# Patient Record
Sex: MALE | Race: WHITE | NOT HISPANIC OR LATINO | Employment: OTHER | URBAN - METROPOLITAN AREA
[De-identification: names, ages, dates, MRNs, and addresses within clinical notes are randomized per-mention and may not be internally consistent; named-entity substitution may affect disease eponyms.]

---

## 2017-02-04 ENCOUNTER — CLINICAL SUPPORT NO REQUIREMENTS (OUTPATIENT)
Dept: CARDIOLOGY | Facility: CLINIC | Age: 77
End: 2017-02-04

## 2017-02-04 DIAGNOSIS — I42.9 CARDIOMYOPATHY (HCC): Primary | ICD-10-CM

## 2017-02-05 ENCOUNTER — TELEPHONE (OUTPATIENT)
Dept: CARDIOLOGY | Facility: CLINIC | Age: 77
End: 2017-02-05

## 2017-02-05 NOTE — TELEPHONE ENCOUNTER
Pt reached RRT (XAVI) as of 2/4/17. He has a Medtronic ICD with RA and RV leads. He is programmed DDD and AP 64% of the time and  < 0.1%. He is not on AC. I called and informed him of his device status.

## 2017-02-15 ENCOUNTER — TRANSCRIBE ORDERS (OUTPATIENT)
Dept: CARDIOLOGY | Facility: CLINIC | Age: 77
End: 2017-02-15

## 2017-02-15 ENCOUNTER — LAB (OUTPATIENT)
Dept: LAB | Facility: HOSPITAL | Age: 77
End: 2017-02-15
Attending: INTERNAL MEDICINE

## 2017-02-15 DIAGNOSIS — Z01.818 PRE-OP TESTING: ICD-10-CM

## 2017-02-15 DIAGNOSIS — Z01.818 PRE-OP TESTING: Primary | ICD-10-CM

## 2017-02-15 LAB
ANION GAP SERPL CALCULATED.3IONS-SCNC: 13.2 MMOL/L
BASOPHILS # BLD AUTO: 0.02 10*3/MM3 (ref 0–0.2)
BASOPHILS NFR BLD AUTO: 0.3 % (ref 0–1.5)
BUN BLD-MCNC: 12 MG/DL (ref 8–23)
BUN/CREAT SERPL: 11.3 (ref 7–25)
CALCIUM SPEC-SCNC: 9.6 MG/DL (ref 8.6–10.5)
CHLORIDE SERPL-SCNC: 99 MMOL/L (ref 98–107)
CO2 SERPL-SCNC: 28.8 MMOL/L (ref 22–29)
CREAT BLD-MCNC: 1.06 MG/DL (ref 0.76–1.27)
DEPRECATED RDW RBC AUTO: 47.3 FL (ref 37–54)
EOSINOPHIL # BLD AUTO: 0.1 10*3/MM3 (ref 0–0.7)
EOSINOPHIL NFR BLD AUTO: 1.5 % (ref 0.3–6.2)
ERYTHROCYTE [DISTWIDTH] IN BLOOD BY AUTOMATED COUNT: 13.4 % (ref 11.5–14.5)
GFR SERPL CREATININE-BSD FRML MDRD: 68 ML/MIN/1.73
GLUCOSE BLD-MCNC: 131 MG/DL (ref 65–99)
HCT VFR BLD AUTO: 47.4 % (ref 40.4–52.2)
HGB BLD-MCNC: 16.3 G/DL (ref 13.7–17.6)
IMM GRANULOCYTES # BLD: 0 10*3/MM3 (ref 0–0.03)
IMM GRANULOCYTES NFR BLD: 0 % (ref 0–0.5)
LYMPHOCYTES # BLD AUTO: 2.63 10*3/MM3 (ref 0.9–4.8)
LYMPHOCYTES NFR BLD AUTO: 39.4 % (ref 19.6–45.3)
MCH RBC QN AUTO: 33.3 PG (ref 27–32.7)
MCHC RBC AUTO-ENTMCNC: 34.4 G/DL (ref 32.6–36.4)
MCV RBC AUTO: 96.7 FL (ref 79.8–96.2)
MONOCYTES # BLD AUTO: 0.57 10*3/MM3 (ref 0.2–1.2)
MONOCYTES NFR BLD AUTO: 8.5 % (ref 5–12)
NEUTROPHILS # BLD AUTO: 3.35 10*3/MM3 (ref 1.9–8.1)
NEUTROPHILS NFR BLD AUTO: 50.3 % (ref 42.7–76)
PLATELET # BLD AUTO: 182 10*3/MM3 (ref 140–500)
PMV BLD AUTO: 9.6 FL (ref 6–12)
POTASSIUM BLD-SCNC: 3.5 MMOL/L (ref 3.5–5.2)
RBC # BLD AUTO: 4.9 10*6/MM3 (ref 4.6–6)
SODIUM BLD-SCNC: 141 MMOL/L (ref 136–145)
WBC NRBC COR # BLD: 6.67 10*3/MM3 (ref 4.5–10.7)

## 2017-02-15 PROCEDURE — 36415 COLL VENOUS BLD VENIPUNCTURE: CPT

## 2017-02-15 PROCEDURE — 80048 BASIC METABOLIC PNL TOTAL CA: CPT

## 2017-02-15 PROCEDURE — 85025 COMPLETE CBC W/AUTO DIFF WBC: CPT

## 2017-02-20 ENCOUNTER — HOSPITAL ENCOUNTER (OUTPATIENT)
Facility: HOSPITAL | Age: 77
Setting detail: HOSPITAL OUTPATIENT SURGERY
Discharge: HOME | End: 2017-02-20
Attending: INTERNAL MEDICINE | Admitting: INTERNAL MEDICINE

## 2017-02-20 VITALS
HEART RATE: 72 BPM | HEIGHT: 71 IN | RESPIRATION RATE: 16 BRPM | WEIGHT: 200 LBS | TEMPERATURE: 98.1 F | DIASTOLIC BLOOD PRESSURE: 67 MMHG | SYSTOLIC BLOOD PRESSURE: 113 MMHG | OXYGEN SATURATION: 96 % | BODY MASS INDEX: 28 KG/M2

## 2017-02-20 PROCEDURE — 25010000002 FENTANYL CITRATE (PF) 100 MCG/2ML SOLUTION: Performed by: INTERNAL MEDICINE

## 2017-02-20 PROCEDURE — 25010000003 CEFAZOLIN PER 500 MG: Performed by: INTERNAL MEDICINE

## 2017-02-20 PROCEDURE — 33263 RMVL & RPLCMT DFB GEN 2 LEAD: CPT | Performed by: INTERNAL MEDICINE

## 2017-02-20 PROCEDURE — 99152 MOD SED SAME PHYS/QHP 5/>YRS: CPT | Performed by: INTERNAL MEDICINE

## 2017-02-20 PROCEDURE — 25010000002 MIDAZOLAM PER 1 MG: Performed by: INTERNAL MEDICINE

## 2017-02-20 PROCEDURE — 99153 MOD SED SAME PHYS/QHP EA: CPT | Performed by: INTERNAL MEDICINE

## 2017-02-20 PROCEDURE — C1721 AICD, DUAL CHAMBER: HCPCS | Performed by: INTERNAL MEDICINE

## 2017-02-20 DEVICE — GEN DEFIB EVERA XT DR: Type: IMPLANTABLE DEVICE | Site: CHEST WALL | Status: FUNCTIONAL

## 2017-02-20 RX ORDER — LIDOCAINE HYDROCHLORIDE 10 MG/ML
0.1 INJECTION, SOLUTION EPIDURAL; INFILTRATION; INTRACAUDAL; PERINEURAL ONCE AS NEEDED
Status: DISCONTINUED | OUTPATIENT
Start: 2017-02-20 | End: 2017-02-20 | Stop reason: HOSPADM

## 2017-02-20 RX ORDER — SODIUM CHLORIDE 9 MG/ML
75 INJECTION, SOLUTION INTRAVENOUS CONTINUOUS
Status: DISCONTINUED | OUTPATIENT
Start: 2017-02-20 | End: 2017-02-20 | Stop reason: HOSPADM

## 2017-02-20 RX ORDER — LIDOCAINE HYDROCHLORIDE 10 MG/ML
INJECTION, SOLUTION EPIDURAL; INFILTRATION; INTRACAUDAL; PERINEURAL AS NEEDED
Status: DISCONTINUED | OUTPATIENT
Start: 2017-02-20 | End: 2017-02-20 | Stop reason: HOSPADM

## 2017-02-20 RX ORDER — FINASTERIDE 5 MG/1
5 TABLET, FILM COATED ORAL DAILY
COMMUNITY

## 2017-02-20 RX ORDER — OXYCODONE HYDROCHLORIDE AND ACETAMINOPHEN 5; 325 MG/1; MG/1
1-2 TABLET ORAL EVERY 4 HOURS PRN
Qty: 15 TABLET | Refills: 0 | Status: SHIPPED | OUTPATIENT
Start: 2017-02-20 | End: 2017-09-06 | Stop reason: HOSPADM

## 2017-02-20 RX ORDER — SODIUM CHLORIDE 0.9 % (FLUSH) 0.9 %
1-10 SYRINGE (ML) INJECTION AS NEEDED
Status: DISCONTINUED | OUTPATIENT
Start: 2017-02-20 | End: 2017-02-20 | Stop reason: HOSPADM

## 2017-02-20 RX ORDER — METOPROLOL TARTRATE 100 MG/1
100 TABLET ORAL 2 TIMES DAILY
COMMUNITY
End: 2017-06-27 | Stop reason: SDUPTHER

## 2017-02-20 RX ORDER — MIDAZOLAM HYDROCHLORIDE 1 MG/ML
INJECTION INTRAMUSCULAR; INTRAVENOUS AS NEEDED
Status: DISCONTINUED | OUTPATIENT
Start: 2017-02-20 | End: 2017-02-20 | Stop reason: HOSPADM

## 2017-02-20 RX ORDER — FENTANYL CITRATE 50 UG/ML
INJECTION, SOLUTION INTRAMUSCULAR; INTRAVENOUS AS NEEDED
Status: DISCONTINUED | OUTPATIENT
Start: 2017-02-20 | End: 2017-02-20 | Stop reason: HOSPADM

## 2017-02-20 RX ORDER — RAMIPRIL 2.5 MG/1
2.5 CAPSULE ORAL DAILY
COMMUNITY
End: 2017-09-06 | Stop reason: SDUPTHER

## 2017-02-20 RX ADMIN — CEFAZOLIN SODIUM 1 G: 1 INJECTION, SOLUTION INTRAVENOUS at 11:22

## 2017-02-20 RX ADMIN — SODIUM CHLORIDE 75 ML/HR: 9 INJECTION, SOLUTION INTRAVENOUS at 09:23

## 2017-02-20 NOTE — PLAN OF CARE
Problem: Patient Care Overview (Adult)  Goal: Plan of Care Review  Outcome: Outcome(s) achieved Date Met:  02/20/17 02/20/17 7534   Coping/Psychosocial Response Interventions   Plan Of Care Reviewed With patient;spouse   Patient Care Overview   Progress improving   Outcome Evaluation   Outcome Summary/Follow up Plan READY FOR D/C       Goal: Adult Individualization and Mutuality  Outcome: Outcome(s) achieved Date Met:  02/20/17  Goal: Discharge Needs Assessment  Outcome: Outcome(s) achieved Date Met:  02/20/17    Problem: Perioperative Period (Adult)  Goal: Signs and Symptoms of Listed Potential Problems Will be Absent or Manageable (Perioperative Period)  Outcome: Ongoing (interventions implemented as appropriate)

## 2017-02-20 NOTE — H&P
Office Visit    2016  [facility] CARDIOLOGY  · Guido Michael MD   Cardiology  · Cardiomyopathy +2 more   Dx  · Cardiomyopathy, Rapid Heart Rate   Reason for visit    Progress Notes   Expand All Collapse All   Date of Office Visit: 2016  Encounter Provider: Guido Michael MD  Place of Service: [facility] CARDIOLOGY  Patient Name: Harvey Leyva  :1940            Chief Complaint   Patient presents with   • Cardiomyopathy   • Rapid Heart Rate      History of Present Illness  Mr. Leyva is a 76-year-old white male with nonischemic cardiomyopathy. As a result of his cardiomyopathy he had runs of ventricular tachycardia that were symptomatic. He underwent an ICD implant. He has been placed on magnesium as well as metoprolol and this has reduced the episodes of ventricular tachycardia substantially. He still has premature ventricular ectopics but he does not have many symptoms associated with them. Symptomatically the patient does complain of some exertional shortness of breath in the heat occasionally he'll notice some lower extremity edema and he is somewhat fatigued from both his cardiomyopathy as well as his medication. His overall ejection fraction is approximately 40%.     Since his last visit in January there have been no new medical events. The patient has not had any ventricular tachycardia that has resulted in any therapy being delivered by his defibrillator.         Medical History          Past Medical History   Diagnosis Date   • Cardiomyopathy     • Cardiomyopathy         nonischemic   • Dyspnea     • Hyperlipidemia     • Rash, skin     • Ventricular tachycardia                  Surgical History          Past Surgical History   Procedure Laterality Date   • Cardiac defibrillator placement                      Current Outpatient Prescriptions:   • ALPRAZolam (XANAX) 0.5 MG tablet, Take by mouth 3 (three) times a day., Disp: ,  "Rfl:   • busPIRone (BUSPAR) 5 MG tablet, Take 1 tablet by mouth 3 (three) times a day., Disp: , Rfl: 2  • dutasteride (AVODART) 0.5 MG capsule, Take 1 capsule by mouth daily., Disp: , Rfl: 5  • hydrochlorothiazide (HYDRODIURIL) 25 MG tablet, Take by mouth daily., Disp: , Rfl:   • MAGNESIUM-OXIDE 400 (241.3 MG) MG tablet tablet, TAKE 1 TABLET BY MOUTH DAILY, Disp: 90 tablet, Rfl: 1  • metoprolol tartrate (LOPRESSOR) 100 MG tablet, Take by mouth 2 (two) times a day., Disp: , Rfl:   • ramipril (ALTACE) 2.5 MG capsule, TAKE ONE CAPSULE BY MOUTH DAILY, Disp: 90 capsule, Rfl: 1  • tamsulosin (FLOMAX) 0.4 MG capsule 24 hr capsule, Take 1 capsule by mouth 2 (two) times a day., Disp: , Rfl:          Social History    History            Social History   • Marital status:        Spouse name: N/A   • Number of children: N/A   • Years of education: N/A          Occupational History   • Not on file.           Social History Main Topics   • Smoking status: Former Smoker   • Smokeless tobacco: Not on file   • Alcohol use: Not on file   • Drug use: Not on file   • Sexual activity: Not on file           Other Topics Concern   • Not on file      Social History Narrative               Review of Systems   Constitution: Positive for malaise/fatigue. Negative for weight gain.   HENT: Negative.   Eyes: Negative. Negative for blurred vision.   Cardiovascular: Positive for dyspnea on exertion and palpitations.   Respiratory: Negative for shortness of breath.   Endocrine: Negative.   Skin: Negative.   Musculoskeletal: Negative.   Gastrointestinal: Negative.   Neurological: Negative.         Procedures     Procedures         Objective:   Objective            Visit Vitals   • /78   • Pulse 81   • Ht 71\" (180.3 cm)   • Wt 200 lb (90.7 kg)   • BMI 27.89 kg/m2               Physical Exam   Constitutional: He is oriented to person, place, and time. He appears well-developed and well-nourished.   HENT:   Head: Normocephalic.   Eyes: " "Pupils are equal, round, and reactive to light.   Neck: Normal range of motion. No JVD present. Carotid bruit is not present. No thyromegaly present.   Cardiovascular: Normal rate, regular rhythm, S1 normal, S2 normal, normal heart sounds and intact distal pulses. Exam reveals no gallop and no friction rub.   No murmur heard.  Pulmonary/Chest: Effort normal and breath sounds normal.   Abdominal: Soft. Bowel sounds are normal.   Musculoskeletal: He exhibits no edema.   Neurological: He is alert and oriented to person, place, and time.   Skin: Skin is warm, dry and intact. No erythema.   Psychiatric: He has a normal mood and affect.   Vitals reviewed.        Assessment:   Assessment         Diagnosis Plan   1. Cardiomyopathy      2. Ventricular tachycardia      3. Premature ventricular contractions               Plan:   No changes were made in the patient's medications today. He appears to be quite stable on the present plan.     The patient did inquire about whether or not he be able to undergo any form of prostate surgery. He is going to check with his urologist about various options that may be available to him for his current situation. I don't think his cardiac situation is unstable enough for him to not consider surgery.Plan              Additional Documentation   Vitals:   · /78   · Pulse 81   · Ht 71\" (180.3 cm)   · Wt 200 lb (90.7 kg)   · BMI 27.89 kg/m2   · BSA 2.11 m2    Flowsheets:  · Custom Formula Data   ·    Encounter Info:   · Billing Info,   · History,   · Allergies,   · Detailed Report,   · Prep for Surgery Order Report,   · PAF,   · Chart Review: Routing History,   · Coding Summary,   · Encounter Facesheet,   · Link Facesheet   · ...(6 more)   Communications   ·   Outpatient sent to Wero Chatman MD   Orders Placed   · None   Medication Changes   ·   Magnesium Oxide   ·   ¨   400 (241.3 MG) MG tablet, TAKE 1 TABLET BY MOUTH DAILY   ¨          Medication List   Visit Diagnoses "   ·   Cardiomyopathy   ·   Ventricular tachycardia   ·   Premature ventricular contractions      Problem List     H&P updated. The patient was examined and generator nancy

## 2017-02-20 NOTE — DISCHARGE INSTRUCTIONS
"[facility]   [phone]    Post Pacemaker / Defibrillator Implant Instructions      1. Resume aspirin/Plaxiv/Coumadin on __________________________________.    2. The dressing may be removed the next day.    3. If steri-strips were used, they should not be removed. Allow them to \"fall off\".    4. You may shower after the dressing is removed. Do not allow shower water to hit directly on incision.    5. No lotion/powder/ointment/cream on incision until it is healed.    6. Gently wash incision daily with antibacterial soap and water and pat dry.    7. You may reapply a dressing if there is drainage, otherwise leave your incision open to air. If you reapply a dressing, please notify the pacemaker clinic.    8. No heavy lifting, pulling, or pushing.    9. Do not raise the affected arm over your head for a minimum of 1 month.    10. The pacemaker clinic will contact you (usually within 1 business day) to schedule a pacemaker/incision check. The check is usually done 7-10 days post-implant. If you have not heard from the pacemaker clinic within 3 days, please call the office.    11. Please call the office if you experience any of the following:   bleeding or drainage from your incision   swelling, redness, or opening of your incision   fever or chills   pain not relieved with medication   chest pain or difficulty breathing   lightheadness    12. For defibrillator patients only: If you receive a shock from your device, please call the office. If you receive 2 or more shocks within a 24 hour period OR if you receive 1 shock and feel poorly, you should be evaluated in the emergency room. Please DO NOT DRIVE if you have received a shock until your device has been checked.        Moderate Conscious Sedation, Adult, Care After  Refer to this sheet in the next few weeks. These instructions provide you with information on caring for yourself after your procedure. Your health care provider may also give you " more specific instructions. Your treatment has been planned according to current medical practices, but problems sometimes occur. Call your health care provider if you have any problems or questions after your procedure.  WHAT TO EXPECT AFTER THE PROCEDURE   After your procedure:  · You may feel sleepy, clumsy, and have poor balance for several hours.  · Vomiting may occur if you eat too soon after the procedure.  HOME CARE INSTRUCTIONS  · Do not participate in any activities where you could become injured for at least 24 hours. Do not:    Drive.    Swim.    Ride a bicycle.    Operate heavy machinery.    Cook.    Use power tools.    Climb ladders.    Work from a high place.  · Do not make important decisions or sign legal documents until you are improved.  · If you vomit, drink water, juice, or soup when you can drink without vomiting. Make sure you have little or no nausea before eating solid foods.  · Only take over-the-counter or prescription medicines for pain, discomfort, or fever as directed by your health care provider.  · Make sure you and your family fully understand everything about the medicines given to you, including what side effects may occur.  · You should not drink alcohol, take sleeping pills, or take medicines that cause drowsiness for at least 24 hours.  · If you smoke, do not smoke without supervision.  · If you are feeling better, you may resume normal activities 24 hours after you were sedated.  · Keep all appointments with your health care provider.  · You should have a responsible adult stay with you for the first 24 hours post procedure.  SEEK MEDICAL CARE IF:  · Your skin is pale or bluish in color.  · You continue to feel nauseous or vomit.  · Your pain is getting worse and is not helped by medicine.  · You have bleeding or swelling.  · You are still sleepy or feeling clumsy after 24 hours.  SEEK IMMEDIATE MEDICAL CARE IF:  · You develop a rash.  · You have difficulty breathing.  · You  develop any type of allergic problem.  · You have a fever.  MAKE SURE YOU:  · Understand these instructions.  · Will watch your condition.  · Will get help right away if you are not doing well or get worse.     This information is not intended to replace advice given to you by your health care provider. Make sure you discuss any questions you have with your health care provider.     Document Released: 10/08/2014 Document Revised: 01/08/2016 Document Reviewed: 10/08/2014  [employer] Interactive Patient Education ©2016 [employer]

## 2017-02-20 NOTE — PERIOPERATIVE NURSING NOTE
medtronic defibrillator booklet and percocet RX given to family  Restrictions and INSTRUCTIONS DISCUSSED AND FAMILY AND PT. VOICED UNDERSTANDING.

## 2017-03-02 ENCOUNTER — CLINICAL SUPPORT NO REQUIREMENTS (OUTPATIENT)
Dept: CARDIOLOGY | Facility: CLINIC | Age: 77
End: 2017-03-02

## 2017-03-02 ENCOUNTER — OFFICE VISIT (OUTPATIENT)
Dept: CARDIOLOGY | Facility: CLINIC | Age: 77
End: 2017-03-02

## 2017-03-02 VITALS
HEIGHT: 71 IN | HEART RATE: 86 BPM | DIASTOLIC BLOOD PRESSURE: 74 MMHG | BODY MASS INDEX: 27.16 KG/M2 | SYSTOLIC BLOOD PRESSURE: 116 MMHG | WEIGHT: 194 LBS

## 2017-03-02 DIAGNOSIS — I47.20 VENTRICULAR TACHYCARDIA (HCC): ICD-10-CM

## 2017-03-02 DIAGNOSIS — I42.9 CARDIOMYOPATHY (HCC): Primary | ICD-10-CM

## 2017-03-02 DIAGNOSIS — I42.0 CARDIOMYOPATHY, DILATED, NONISCHEMIC (HCC): Primary | ICD-10-CM

## 2017-03-02 PROCEDURE — 99024 POSTOP FOLLOW-UP VISIT: CPT | Performed by: INTERNAL MEDICINE

## 2017-03-02 PROCEDURE — 93283 PRGRMG EVAL IMPLANTABLE DFB: CPT | Performed by: INTERNAL MEDICINE

## 2017-03-02 RX ORDER — CALCIUM CARBONATE/VITAMIN D3 500-10/5ML
LIQUID (ML) ORAL
COMMUNITY
End: 2017-09-06 | Stop reason: SDUPTHER

## 2017-03-02 NOTE — PROGRESS NOTES
Date of Office Visit: 2017  Encounter Provider: Guido Michael MD  Place of Service: [facility] CARDIOLOGY  Patient Name: Harvey Leyva  :1940      Chief Complaint   Patient presents with   • Cardiomyopathy     6mnth follow up   • Rapid Heart Rate     History of Present Illness  The patient is a 76-year-old white male with a history of a dilated nonischemic cardiomyopathy.  His ejection fraction is approximately 40%.  He also has a history of ventricular tachycardia for which he received an ICD.  Adjustment in his medication including the addition of magnesium has decreased his episodes of ventricular tachycardia substantially.  His ICD was checked today there've been no reported runs of V. tach since his last check.      Symptomatically the patient reports some exertional dyspnea and fatigue.  He does not complain of palpitations nor does he have any complaints of substernal chest discomfort.  He denies orthopnea, paroxysmal nocturnal dyspnea at this time.    Past Medical History   Diagnosis Date   • Cardiomyopathy    • Cardiomyopathy      nonischemic   • Dyspnea    • Hyperlipidemia    • Rash, skin    • Ventricular tachycardia          Past Surgical History   Procedure Laterality Date   • Cardiac defibrillator placement     • Back surgery     • Hernia repair     • Vein ligation and stripping     • Cataract extraction     • Cardiac electrophysiology procedure Left 2017     Procedure: Device Replacement   ICD GEN CHG MEDTRONIC;  Surgeon: Guido Michael MD;  Location: [facility] INVASIVE LOCATION;  Service:            Current Outpatient Prescriptions:   •  ALPRAZolam (XANAX) 0.5 MG tablet, Take 0.5 mg by mouth 3 (Three) Times a Day., Disp: , Rfl:   •  Carboxymethylcellul-Glycerin (CLEAR EYES FOR DRY EYES OP), Apply 1 drop to eye 2 (Two) Times a Day., Disp: , Rfl:   •  finasteride (PROSCAR) 5 MG tablet, Take 5 mg by mouth Daily., Disp: , Rfl:   •   "hydrochlorothiazide (HYDRODIURIL) 25 MG tablet, Take 25 mg by mouth Daily., Disp: , Rfl:   •  Magnesium Oxide 400 MG capsule, Take  by mouth., Disp: , Rfl:   •  metoprolol tartrate (LOPRESSOR) 100 MG tablet, Take 100 mg by mouth 2 (Two) Times a Day., Disp: , Rfl:   •  ramipril (ALTACE) 2.5 MG capsule, Take 2.5 mg by mouth Daily., Disp: , Rfl:   •  tamsulosin (FLOMAX) 0.4 MG capsule 24 hr capsule, Take 0.4 mg by mouth 2 (Two) Times a Day., Disp: , Rfl:   •  oxyCODONE-acetaminophen (PERCOCET) 5-325 MG per tablet, Take 1-2 tablets by mouth Every 4 (Four) Hours As Needed (Pain)., Disp: 15 tablet, Rfl: 0      Social History     Social History   • Marital status:      Spouse name: N/A   • Number of children: N/A   • Years of education: N/A     Occupational History   • Not on file.     Social History Main Topics   • Smoking status: Never Smoker   • Smokeless tobacco: Not on file   • Alcohol use No   • Drug use: No   • Sexual activity: Defer     Other Topics Concern   • Not on file     Social History Narrative         Review of Systems   Constitution: Positive for malaise/fatigue.   HENT: Negative.    Eyes: Negative.    Cardiovascular: Positive for dyspnea on exertion.   Respiratory: Negative.    Endocrine: Negative.    Skin: Negative.    Musculoskeletal: Negative.    Gastrointestinal: Negative.    Neurological: Negative.    Psychiatric/Behavioral: Negative.        Procedures    Procedures       Objective:      Visit Vitals   • /74   • Pulse 86   • Ht 71\" (180.3 cm)   • Wt 194 lb (88 kg)   • BMI 27.06 kg/m2           Physical Exam   Constitutional: He is oriented to person, place, and time. He appears well-developed and well-nourished.   HENT:   Head: Normocephalic.   Eyes: Pupils are equal, round, and reactive to light.   Neck: Normal range of motion. No JVD present. Carotid bruit is not present. No thyromegaly present.   Cardiovascular: Normal rate, regular rhythm, S1 normal, S2 normal, normal heart sounds " and intact distal pulses.  Exam reveals no gallop and no friction rub.    No murmur heard.  Pulses:       Carotid pulses are 1+ on the right side, and 1+ on the left side.       Radial pulses are 1+ on the right side, and 1+ on the left side.        Femoral pulses are 1+ on the right side, and 1+ on the left side.       Popliteal pulses are 1+ on the right side, and 1+ on the left side.        Dorsalis pedis pulses are 1+ on the right side, and 1+ on the left side.        Posterior tibial pulses are 1+ on the right side, and 1+ on the left side.   Pulmonary/Chest: Effort normal and breath sounds normal.   Abdominal: Soft. Bowel sounds are normal.   Musculoskeletal: He exhibits no edema.   Neurological: He is alert and oriented to person, place, and time.   Skin: Skin is warm, dry and intact. No erythema.   Psychiatric: He has a normal mood and affect.   Vitals reviewed.          Assessment:       Diagnosis Plan   1. Cardiomyopathy, dilated, nonischemic     2. Ventricular tachycardia              Plan:    1.  Continue present medication  2.  Follow-up in 6 months

## 2017-03-28 RX ORDER — RAMIPRIL 2.5 MG/1
CAPSULE ORAL
Qty: 90 CAPSULE | Refills: 0 | Status: SHIPPED | OUTPATIENT
Start: 2017-03-28 | End: 2017-06-24 | Stop reason: SDUPTHER

## 2017-03-28 RX ORDER — METOPROLOL TARTRATE 100 MG/1
TABLET ORAL
Qty: 180 TABLET | Refills: 0 | Status: SHIPPED | OUTPATIENT
Start: 2017-03-28 | End: 2017-06-27 | Stop reason: SDUPTHER

## 2017-06-05 ENCOUNTER — CLINICAL SUPPORT NO REQUIREMENTS (OUTPATIENT)
Dept: CARDIOLOGY | Facility: CLINIC | Age: 77
End: 2017-06-05

## 2017-06-05 DIAGNOSIS — I42.9 CARDIOMYOPATHY (HCC): Primary | ICD-10-CM

## 2017-06-05 PROCEDURE — 93297 REM INTERROG DEV EVAL ICPMS: CPT | Performed by: INTERNAL MEDICINE

## 2017-06-05 PROCEDURE — 93295 DEV INTERROG REMOTE 1/2/MLT: CPT | Performed by: INTERNAL MEDICINE

## 2017-06-05 PROCEDURE — 93296 REM INTERROG EVL PM/IDS: CPT | Performed by: INTERNAL MEDICINE

## 2017-06-26 RX ORDER — RAMIPRIL 2.5 MG/1
CAPSULE ORAL
Qty: 90 CAPSULE | Refills: 1 | Status: SHIPPED | OUTPATIENT
Start: 2017-06-26 | End: 2017-09-08

## 2017-06-27 RX ORDER — METOPROLOL TARTRATE 100 MG/1
100 TABLET ORAL 2 TIMES DAILY
Qty: 180 TABLET | Refills: 1 | Status: SHIPPED | OUTPATIENT
Start: 2017-06-27 | End: 2017-09-06 | Stop reason: SDUPTHER

## 2017-09-06 ENCOUNTER — CLINICAL SUPPORT NO REQUIREMENTS (OUTPATIENT)
Dept: CARDIOLOGY | Facility: CLINIC | Age: 77
End: 2017-09-06

## 2017-09-06 ENCOUNTER — OFFICE VISIT (OUTPATIENT)
Dept: CARDIOLOGY | Facility: CLINIC | Age: 77
End: 2017-09-06

## 2017-09-06 VITALS
BODY MASS INDEX: 27.3 KG/M2 | WEIGHT: 195 LBS | HEART RATE: 75 BPM | DIASTOLIC BLOOD PRESSURE: 70 MMHG | SYSTOLIC BLOOD PRESSURE: 130 MMHG | HEIGHT: 71 IN

## 2017-09-06 DIAGNOSIS — I47.20 VENTRICULAR TACHYCARDIA (HCC): ICD-10-CM

## 2017-09-06 DIAGNOSIS — I42.0 CARDIOMYOPATHY, DILATED, NONISCHEMIC (HCC): Primary | ICD-10-CM

## 2017-09-06 DIAGNOSIS — I42.9 CARDIOMYOPATHY (HCC): Primary | ICD-10-CM

## 2017-09-06 PROCEDURE — 93290 INTERROG DEV EVAL ICPMS IP: CPT | Performed by: INTERNAL MEDICINE

## 2017-09-06 PROCEDURE — 99214 OFFICE O/P EST MOD 30 MIN: CPT | Performed by: INTERNAL MEDICINE

## 2017-09-06 PROCEDURE — 93283 PRGRMG EVAL IMPLANTABLE DFB: CPT | Performed by: INTERNAL MEDICINE

## 2017-09-06 RX ORDER — CALCIUM CARBONATE/VITAMIN D3 500-10/5ML
400 LIQUID (ML) ORAL DAILY
Qty: 90 EACH | Refills: 1 | Status: SHIPPED | OUTPATIENT
Start: 2017-09-06 | End: 2018-11-13 | Stop reason: SINTOL

## 2017-09-06 RX ORDER — METOPROLOL TARTRATE 100 MG/1
100 TABLET ORAL 2 TIMES DAILY
Qty: 180 TABLET | Refills: 1 | Status: SHIPPED | OUTPATIENT
Start: 2017-09-06 | End: 2017-10-04 | Stop reason: SDUPTHER

## 2017-09-06 NOTE — PROGRESS NOTES
Date of Office Visit: 2017  Encounter Provider: Guido Michael MD  Place of Service: [facility] CARDIOLOGY  Patient Name: Harvey Leyva  :1940      Chief Complaint   Patient presents with   • Cardiomyopathy     History of Present Illness  She is a 77-year-old white male with a history of nonischemic cardiomyopathy as well as ventricular tachycardia.  He returns to the office today not feeling well complaining of more weakness and fatigue and exertional dyspnea.  His last ejection fraction was calculated to be 40% on an echo done approximately 4 years ago.  He does not complain of any chest pain.  He does not have any particular complaints of exertional dyspnea.  When he primarily complains of his palpitations and swelling of his lower extremities intermittently as well as just generally run down feeling.    Past Medical History:   Diagnosis Date   • Cardiomyopathy     nonischemic   • Difficulty breathing    • Dyspnea    • Hyperlipidemia    • Rash, skin    • Ventricular tachycardia          Past Surgical History:   Procedure Laterality Date   • BACK SURGERY     • CARDIAC DEFIBRILLATOR PLACEMENT     • CARDIAC ELECTROPHYSIOLOGY PROCEDURE Left 2017    Procedure: Device Replacement   ICD GEN CHG MEDTRONIC;  Surgeon: Guido Michael MD;  Location: [facility] INVASIVE LOCATION;  Service:    • CATARACT EXTRACTION     • HERNIA REPAIR     • VEIN LIGATION AND STRIPPING             Current Outpatient Prescriptions:   •  ALPRAZolam (XANAX) 0.5 MG tablet, Take 0.5 mg by mouth 3 (Three) Times a Day., Disp: , Rfl:   •  Carboxymethylcellul-Glycerin (CLEAR EYES FOR DRY EYES OP), Apply 1 drop to eye 2 (Two) Times a Day., Disp: , Rfl:   •  finasteride (PROSCAR) 5 MG tablet, Take 5 mg by mouth Daily., Disp: , Rfl:   •  hydrochlorothiazide (HYDRODIURIL) 25 MG tablet, Take 25 mg by mouth Daily., Disp: , Rfl:   •  Magnesium Oxide 400 MG capsule, Take  by mouth., Disp: , Rfl:   •   "metoprolol tartrate (LOPRESSOR) 100 MG tablet, Take 1 tablet by mouth 2 (Two) Times a Day., Disp: 180 tablet, Rfl: 1  •  ramipril (ALTACE) 2.5 MG capsule, TAKE 1 CAPSULE BY MOUTH DAILY, Disp: 90 capsule, Rfl: 1  •  tamsulosin (FLOMAX) 0.4 MG capsule 24 hr capsule, Take 0.4 mg by mouth 2 (Two) Times a Day., Disp: , Rfl:       Social History     Social History   • Marital status:      Spouse name: N/A   • Number of children: N/A   • Years of education: N/A     Occupational History   • Not on file.     Social History Main Topics   • Smoking status: Former Smoker   • Smokeless tobacco: Not on file   • Alcohol use No      Comment: caffeine use   • Drug use: No   • Sexual activity: Defer     Other Topics Concern   • Not on file     Social History Narrative         Review of Systems   Constitution: Negative.   HENT: Negative.    Eyes: Negative.    Cardiovascular: Negative.    Respiratory: Negative.    Endocrine: Negative.    Skin: Negative.    Musculoskeletal: Negative.    Gastrointestinal: Negative.    Neurological: Negative.    Psychiatric/Behavioral: Negative.        Procedures    Procedures       Objective:    /70 (BP Location: Left arm)  Pulse 75  Ht 71\" (180.3 cm)  Wt 195 lb (88.5 kg)  BMI 27.2 kg/m2        Physical Exam   Constitutional: He is oriented to person, place, and time. He appears well-developed and well-nourished.   HENT:   Head: Normocephalic.   Eyes: Pupils are equal, round, and reactive to light.   Neck: Normal range of motion. No JVD present. Carotid bruit is not present. No thyromegaly present.   Cardiovascular: Normal rate, regular rhythm, S1 normal, S2 normal, normal heart sounds and intact distal pulses.  Exam reveals no gallop and no friction rub.    No murmur heard.  Pulmonary/Chest: Effort normal and breath sounds normal.   Abdominal: Soft. Bowel sounds are normal.   Musculoskeletal: He exhibits no edema.   Neurological: He is alert and oriented to person, place, and time. "   Skin: Skin is warm, dry and intact. No erythema.   Psychiatric: He has a normal mood and affect.   Vitals reviewed.          Assessment:       Diagnosis Plan   1. Cardiomyopathy  Adult Transthoracic Echo Complete   2. Ventricular tachycardia  Adult Transthoracic Echo Complete       I am concerned about his symptoms.  We'll going to recheck an echocardiogram at this time.     Plan:

## 2017-09-07 ENCOUNTER — HOSPITAL ENCOUNTER (OUTPATIENT)
Dept: CARDIOLOGY | Facility: HOSPITAL | Age: 77
Discharge: HOME | End: 2017-09-07
Attending: INTERNAL MEDICINE | Admitting: INTERNAL MEDICINE

## 2017-09-07 DIAGNOSIS — I42.9 CARDIOMYOPATHY (HCC): ICD-10-CM

## 2017-09-07 DIAGNOSIS — I47.20 VENTRICULAR TACHYCARDIA (HCC): ICD-10-CM

## 2017-09-07 LAB
BH CV ECHO MEAS - ACS: 2.6 CM
BH CV ECHO MEAS - AI DEC SLOPE: 182 CM/SEC^2
BH CV ECHO MEAS - AI MAX PG: 46.8 MMHG
BH CV ECHO MEAS - AI MAX VEL: 342 CM/SEC
BH CV ECHO MEAS - AI P1/2T: 550.4 MSEC
BH CV ECHO MEAS - AO MAX PG (FULL): 1.9 MMHG
BH CV ECHO MEAS - AO MAX PG: 3.3 MMHG
BH CV ECHO MEAS - AO MEAN PG (FULL): 2 MMHG
BH CV ECHO MEAS - AO MEAN PG: 3 MMHG
BH CV ECHO MEAS - AO ROOT AREA (BSA CORRECTED): 2.2
BH CV ECHO MEAS - AO ROOT AREA: 16.6 CM^2
BH CV ECHO MEAS - AO ROOT DIAM: 4.6 CM
BH CV ECHO MEAS - AO V2 MAX: 91 CM/SEC
BH CV ECHO MEAS - AO V2 MEAN: 77.2 CM/SEC
BH CV ECHO MEAS - AO V2 VTI: 24.7 CM
BH CV ECHO MEAS - AVA(I,A): 2.3 CM^2
BH CV ECHO MEAS - AVA(I,D): 2.3 CM^2
BH CV ECHO MEAS - AVA(V,A): 2.9 CM^2
BH CV ECHO MEAS - AVA(V,D): 2.9 CM^2
BH CV ECHO MEAS - BSA(HAYCOCK): 2.1 M^2
BH CV ECHO MEAS - BSA: 2.1 M^2
BH CV ECHO MEAS - BZI_BMI: 27.2 KILOGRAMS/M^2
BH CV ECHO MEAS - BZI_METRIC_HEIGHT: 180.3 CM
BH CV ECHO MEAS - BZI_METRIC_WEIGHT: 88.5 KG
BH CV ECHO MEAS - CI(CUBED): 2.2 L/MIN/M^2
BH CV ECHO MEAS - CI(MOD-SP2): 1.1 L/MIN/M^2
BH CV ECHO MEAS - CI(MOD-SP4): 1 L/MIN/M^2
BH CV ECHO MEAS - CI(TEICH): 1.8 L/MIN/M^2
BH CV ECHO MEAS - CO(CUBED): 4.5 L/MIN
BH CV ECHO MEAS - CO(MOD-SP2): 2.3 L/MIN
BH CV ECHO MEAS - CO(MOD-SP4): 2.1 L/MIN
BH CV ECHO MEAS - CO(TEICH): 3.7 L/MIN
BH CV ECHO MEAS - CONTRAST EF (2CH): 34.9 ML/M^2
BH CV ECHO MEAS - CONTRAST EF 4CH: 30.7 ML/M^2
BH CV ECHO MEAS - EDV(MOD-SP2): 106 ML
BH CV ECHO MEAS - EDV(MOD-SP4): 114 ML
BH CV ECHO MEAS - EDV(TEICH): 118.2 ML
BH CV ECHO MEAS - EF(CUBED): 59.5 %
BH CV ECHO MEAS - EF(MOD-SP2): 34.9 %
BH CV ECHO MEAS - EF(MOD-SP4): 30.7 %
BH CV ECHO MEAS - EF(TEICH): 50.8 %
BH CV ECHO MEAS - ESV(MOD-SP2): 69 ML
BH CV ECHO MEAS - ESV(MOD-SP4): 79 ML
BH CV ECHO MEAS - ESV(TEICH): 58.1 ML
BH CV ECHO MEAS - FS: 26 %
BH CV ECHO MEAS - IVS/LVPW: 1.2
BH CV ECHO MEAS - IVSD: 1.1 CM
BH CV ECHO MEAS - LAT PEAK E' VEL: 6 CM/SEC
BH CV ECHO MEAS - LV DIASTOLIC VOL/BSA (35-75): 54.6 ML/M^2
BH CV ECHO MEAS - LV MASS(C)D: 182 GRAMS
BH CV ECHO MEAS - LV MASS(C)DI: 87.2 GRAMS/M^2
BH CV ECHO MEAS - LV MAX PG: 1.4 MMHG
BH CV ECHO MEAS - LV MEAN PG: 1 MMHG
BH CV ECHO MEAS - LV SYSTOLIC VOL/BSA (12-30): 37.9 ML/M^2
BH CV ECHO MEAS - LV V1 MAX: 58.7 CM/SEC
BH CV ECHO MEAS - LV V1 MEAN: 43.8 CM/SEC
BH CV ECHO MEAS - LV V1 VTI: 12.8 CM
BH CV ECHO MEAS - LVIDD: 5 CM
BH CV ECHO MEAS - LVIDS: 3.7 CM
BH CV ECHO MEAS - LVLD AP2: 8.3 CM
BH CV ECHO MEAS - LVLD AP4: 8.5 CM
BH CV ECHO MEAS - LVLS AP2: 7.9 CM
BH CV ECHO MEAS - LVLS AP4: 7.5 CM
BH CV ECHO MEAS - LVOT AREA (M): 4.5 CM^2
BH CV ECHO MEAS - LVOT AREA: 4.5 CM^2
BH CV ECHO MEAS - LVOT DIAM: 2.4 CM
BH CV ECHO MEAS - LVPWD: 0.9 CM
BH CV ECHO MEAS - MED PEAK E' VEL: 6 CM/SEC
BH CV ECHO MEAS - MM HR: 61 BPM
BH CV ECHO MEAS - MM R-R INT: 0.98 SEC
BH CV ECHO MEAS - MV A DUR: 0.12 SEC
BH CV ECHO MEAS - MV A MAX VEL: 74 CM/SEC
BH CV ECHO MEAS - MV DEC SLOPE: 188 CM/SEC^2
BH CV ECHO MEAS - MV DEC TIME: 0.23 SEC
BH CV ECHO MEAS - MV E MAX VEL: 40.5 CM/SEC
BH CV ECHO MEAS - MV E/A: 0.55
BH CV ECHO MEAS - MV MAX PG: 2.2 MMHG
BH CV ECHO MEAS - MV MEAN PG: 1 MMHG
BH CV ECHO MEAS - MV P1/2T MAX VEL: 42.9 CM/SEC
BH CV ECHO MEAS - MV P1/2T: 66.8 MSEC
BH CV ECHO MEAS - MV V2 MAX: 73.9 CM/SEC
BH CV ECHO MEAS - MV V2 MEAN: 36.4 CM/SEC
BH CV ECHO MEAS - MV V2 VTI: 17.6 CM
BH CV ECHO MEAS - MVA P1/2T LCG: 5.1 CM^2
BH CV ECHO MEAS - MVA(P1/2T): 3.3 CM^2
BH CV ECHO MEAS - MVA(VTI): 3.3 CM^2
BH CV ECHO MEAS - PA MAX PG (FULL): 4.4 MMHG
BH CV ECHO MEAS - PA MAX PG: 5.5 MMHG
BH CV ECHO MEAS - PA V2 MAX: 117 CM/SEC
BH CV ECHO MEAS - PVA(V,A): 1.5 CM^2
BH CV ECHO MEAS - PVA(V,D): 1.5 CM^2
BH CV ECHO MEAS - QP/QS: 0.83
BH CV ECHO MEAS - RAP SYSTOLE: 8 MMHG
BH CV ECHO MEAS - RV MAX PG: 1.1 MMHG
BH CV ECHO MEAS - RV MEAN PG: 1 MMHG
BH CV ECHO MEAS - RV V1 MAX: 51.9 CM/SEC
BH CV ECHO MEAS - RV V1 MEAN: 40.6 CM/SEC
BH CV ECHO MEAS - RV V1 VTI: 13.8 CM
BH CV ECHO MEAS - RVOT AREA: 3.5 CM^2
BH CV ECHO MEAS - RVOT DIAM: 2.1 CM
BH CV ECHO MEAS - RVSP: 26 MMHG
BH CV ECHO MEAS - SI(AO): 196.8 ML/M^2
BH CV ECHO MEAS - SI(CUBED): 35.6 ML/M^2
BH CV ECHO MEAS - SI(LVOT): 27.8 ML/M^2
BH CV ECHO MEAS - SI(MOD-SP2): 17.7 ML/M^2
BH CV ECHO MEAS - SI(MOD-SP4): 16.8 ML/M^2
BH CV ECHO MEAS - SI(TEICH): 28.8 ML/M^2
BH CV ECHO MEAS - SUP REN AO DIAM: 1.9 CM
BH CV ECHO MEAS - SV(AO): 410.5 ML
BH CV ECHO MEAS - SV(CUBED): 74.3 ML
BH CV ECHO MEAS - SV(LVOT): 57.9 ML
BH CV ECHO MEAS - SV(MOD-SP2): 37 ML
BH CV ECHO MEAS - SV(MOD-SP4): 35 ML
BH CV ECHO MEAS - SV(RVOT): 47.8 ML
BH CV ECHO MEAS - SV(TEICH): 60.1 ML
BH CV ECHO MEAS - TAPSE (>1.6): 1.4 CM2
BH CV ECHO MEAS - TR MAX VEL: 213 CM/SEC
BH CV XLRA - RV BASE: 3.1 CM
BH CV XLRA - TDI S': 8 CM/SEC
E/E' RATIO: 7
LEFT ATRIUM VOLUME INDEX: 27 ML/M2
LEFT ATRIUM VOLUME: 56 CM3
LV EF 2D ECHO EST: 30 %

## 2017-09-07 PROCEDURE — 93306 TTE W/DOPPLER COMPLETE: CPT | Performed by: INTERNAL MEDICINE

## 2017-09-07 PROCEDURE — 93306 TTE W/DOPPLER COMPLETE: CPT

## 2017-09-08 ENCOUNTER — TELEPHONE (OUTPATIENT)
Dept: CARDIOLOGY | Facility: CLINIC | Age: 77
End: 2017-09-08

## 2017-10-04 ENCOUNTER — OFFICE VISIT (OUTPATIENT)
Dept: CARDIOLOGY | Facility: CLINIC | Age: 77
End: 2017-10-04

## 2017-10-04 VITALS
WEIGHT: 194 LBS | DIASTOLIC BLOOD PRESSURE: 58 MMHG | SYSTOLIC BLOOD PRESSURE: 92 MMHG | HEIGHT: 71 IN | HEART RATE: 99 BPM | BODY MASS INDEX: 27.16 KG/M2

## 2017-10-04 DIAGNOSIS — I42.0 DILATED CARDIOMYOPATHY (HCC): Primary | ICD-10-CM

## 2017-10-04 DIAGNOSIS — I95.2 HYPOTENSION DUE TO DRUGS: ICD-10-CM

## 2017-10-04 DIAGNOSIS — I47.20 VENTRICULAR TACHYCARDIA (HCC): ICD-10-CM

## 2017-10-04 PROCEDURE — 99214 OFFICE O/P EST MOD 30 MIN: CPT | Performed by: INTERNAL MEDICINE

## 2017-10-04 PROCEDURE — 93000 ELECTROCARDIOGRAM COMPLETE: CPT | Performed by: INTERNAL MEDICINE

## 2017-10-04 RX ORDER — METOPROLOL TARTRATE 100 MG/1
TABLET ORAL
Qty: 180 TABLET | Refills: 1
Start: 2017-10-04 | End: 2018-02-08 | Stop reason: DRUGHIGH

## 2017-10-04 NOTE — PROGRESS NOTES
Date of Office Visit: 10/04/2017  Encounter Provider: Guido Michael MD  Place of Service: [facility] CARDIOLOGY  Patient Name: Harvey Leyva  :1940      Chief Complaint   Patient presents with   • Cardiomyopathy     History of Present Illness  Agent is a 77-year-old white male with a history of a dilated nonischemic cardiomyopathy as well as ventricular tachycardia for which she's had an AICD implant.  His last ejection fraction was down around 30%.  He was recently started on some different medications including Entresto.  It does not appear that he's tolerating his medications very well.  He's feeling lightheaded and dizzy and and fatigue.  His blood pressure today is low and he reports the same at home.    Past Medical History:   Diagnosis Date   • Cardiomyopathy     nonischemic   • Difficulty breathing    • Dyspnea    • Hyperlipidemia    • Rash, skin    • Ventricular tachycardia          Past Surgical History:   Procedure Laterality Date   • BACK SURGERY     • CARDIAC DEFIBRILLATOR PLACEMENT     • CARDIAC ELECTROPHYSIOLOGY PROCEDURE Left 2017    Procedure: Device Replacement   ICD GEN CHG MEDTRONIC;  Surgeon: Guido Michael MD;  Location: [facility] INVASIVE LOCATION;  Service:    • CATARACT EXTRACTION     • HERNIA REPAIR     • VEIN LIGATION AND STRIPPING             Current Outpatient Prescriptions:   •  ALPRAZolam (XANAX) 0.5 MG tablet, Take 0.5 mg by mouth 3 (Three) Times a Day., Disp: , Rfl:   •  Carboxymethylcellul-Glycerin (CLEAR EYES FOR DRY EYES OP), Apply 1 drop to eye 2 (Two) Times a Day., Disp: , Rfl:   •  finasteride (PROSCAR) 5 MG tablet, Take 5 mg by mouth Daily., Disp: , Rfl:   •  Magnesium Oxide 400 MG capsule, Take 400 mg by mouth Daily., Disp: 90 each, Rfl: 1  •  metoprolol tartrate (LOPRESSOR) 100 MG tablet, Take 1/2 tablet 2 x per day, Disp: 180 tablet, Rfl: 1  •  sacubitril-valsartan (ENTRESTO) 24-26 MG tablet, Take 1 tablet by mouth 2  "(Two) Times a Day., Disp: 60 tablet, Rfl: 2  •  tamsulosin (FLOMAX) 0.4 MG capsule 24 hr capsule, Take 0.4 mg by mouth 2 (Two) Times a Day., Disp: , Rfl:       Social History     Social History   • Marital status:      Spouse name: N/A   • Number of children: N/A   • Years of education: N/A     Occupational History   • Not on file.     Social History Main Topics   • Smoking status: Former Smoker   • Smokeless tobacco: Not on file   • Alcohol use No      Comment: caffeine use   • Drug use: No   • Sexual activity: Defer     Other Topics Concern   • Not on file     Social History Narrative         Review of Systems   Constitution: Positive for malaise/fatigue.   HENT: Negative.    Eyes: Negative.    Cardiovascular: Negative.    Respiratory: Negative.    Endocrine: Negative.    Skin: Negative.    Musculoskeletal: Negative.    Gastrointestinal: Negative.    Neurological: Positive for light-headedness.   Psychiatric/Behavioral: Negative.        Procedures      ECG 12 Lead  Date/Time: 10/4/2017 3:05 PM  Performed by: STEVAN PICKENS  Authorized by: STEVAN PICKENS   Comparison: not compared with previous ECG   Previous ECG: no previous ECG available  Rhythm: sinus rhythm  Ectopy: PVCs  Rate: normal  QRS axis: normal  Comments: Nonspecific ST-T wave changes               Objective:    BP 92/58  Pulse 99  Ht 71\" (180.3 cm)  Wt 194 lb (88 kg)  BMI 27.06 kg/m2        Physical Exam   Constitutional: He is oriented to person, place, and time. He appears well-developed and well-nourished.   HENT:   Head: Normocephalic.   Eyes: Pupils are equal, round, and reactive to light.   Neck: Normal range of motion. No JVD present. Carotid bruit is not present. No thyromegaly present.   Cardiovascular: Normal rate, regular rhythm, S1 normal, S2 normal, normal heart sounds and intact distal pulses.  Exam reveals no gallop and no friction rub.    No murmur heard.  Pulmonary/Chest: Effort normal and breath sounds normal. "   Abdominal: Soft. Bowel sounds are normal.   Musculoskeletal: He exhibits no edema.   Neurological: He is alert and oriented to person, place, and time.   Skin: Skin is warm, dry and intact. No erythema.   Psychiatric: He has a normal mood and affect.   Vitals reviewed.          Assessment:       Diagnosis Plan   1. Dilated cardiomyopathy     2. Ventricular tachycardia     3. Hypotension due to drugs              Plan:       1.  Discontinue HCTZ   2.  Decrease metoprolol tartrate to 50 mg daily  3.  Follow-up in one month with Sangeetha SHIELDS for reevaluation specifically blood pressure check.  The patient may not be able to tolerate the Entresto going forward.

## 2017-10-12 ENCOUNTER — TELEPHONE (OUTPATIENT)
Dept: CARDIOLOGY | Facility: CLINIC | Age: 77
End: 2017-10-12

## 2017-10-12 NOTE — TELEPHONE ENCOUNTER
Pt is calling, he is reporting that he had gained 7 lbs in the past 6 days, so he took a HCTZ 25 mg last night & one this morning and he has lost 5 lbs overnight & feeling better. How many more should he take before he stops, he thinks he should be back to normal tomorrow. His bp is staying in the 120's systolic. Pt # [phone].[email or web address]

## 2017-11-03 ENCOUNTER — OFFICE VISIT (OUTPATIENT)
Dept: CARDIOLOGY | Facility: CLINIC | Age: 77
End: 2017-11-03

## 2017-11-03 VITALS
HEIGHT: 71 IN | BODY MASS INDEX: 28 KG/M2 | HEART RATE: 79 BPM | WEIGHT: 200 LBS | DIASTOLIC BLOOD PRESSURE: 70 MMHG | SYSTOLIC BLOOD PRESSURE: 118 MMHG

## 2017-11-03 DIAGNOSIS — I42.0 CARDIOMYOPATHY, DILATED, NONISCHEMIC (HCC): Primary | ICD-10-CM

## 2017-11-03 PROCEDURE — 99214 OFFICE O/P EST MOD 30 MIN: CPT | Performed by: NURSE PRACTITIONER

## 2017-11-03 PROCEDURE — 93000 ELECTROCARDIOGRAM COMPLETE: CPT | Performed by: NURSE PRACTITIONER

## 2017-11-03 RX ORDER — FUROSEMIDE 20 MG/1
20 TABLET ORAL DAILY
Qty: 30 TABLET | Refills: 1 | Status: SHIPPED | OUTPATIENT
Start: 2017-11-03 | End: 2017-11-20

## 2017-11-03 RX ORDER — SPIRONOLACTONE 25 MG/1
12.5 TABLET ORAL DAILY
Qty: 15 TABLET | Refills: 1 | Status: SHIPPED | OUTPATIENT
Start: 2017-11-03 | End: 2017-12-26 | Stop reason: SDUPTHER

## 2017-11-03 RX ORDER — HYDROCHLOROTHIAZIDE 25 MG/1
25 TABLET ORAL DAILY
COMMUNITY
End: 2017-11-03

## 2017-11-03 NOTE — PROGRESS NOTES
Date of Office Visit: 2017  Encounter Provider: ALYSON Hickman  Place of Service: [facility] CARDIOLOGY  Patient Name: Harvey Leyva  :1940    Chief Complaint   Patient presents with   • Shortness of Breath   :     HPI: Harvey Leyva is a 77 y.o. male comes in today for a 4 week follow-up for cardiomyopathy.  He has a history of a nonischemic cardiomyopathy as well as ventricular tachycardia.  He does have an ICD implanted..      He was in to see Dr. Michael in 2017 complaining of some edema and palpitations.  Dr. Michael was concerned about his symptoms.  He had an echocardiogram showing his EF 30%, mild aortic valve regurgitation, mild tricuspid valve regurgitation.  He was attempted to be started on Entresto and felt very lightheaded and dizzy and fatigued.  His blood pressure was low in the office in follow-up.    His hydrochlorothiazide was decreased.  He does report he gains weight and fluid is feeling takes half hydrochlorothiazide.  His metoprolol was decreased to 50 mg daily.    Today, the patient comes in for follow-up.  He was unable to stop his hydrochlorothiazide.  He started gaining weight so he has been taking a half tablet and sometimes a whole tablet.  He also restarted his metoprolol at 50 mg 3 times a day because he was having palpitations and skipped beats.  If he takes a whole tablet of hydrochlorothiazide, he is up all night using the bathroom.  He is having some dizziness with standing.  He also has continued shortness of breath.  Denies edema today.  Denies chest pain, orthopnea or PND.    Past Medical History:   Diagnosis Date   • Cardiomyopathy     nonischemic   • Difficulty breathing    • Dilated cardiomyopathy    • Dizzy    • Dyspnea    • Fatigue    • Hyperlipidemia    • Hypotension     due to drugs   • Lightheaded    • Malaise and fatigue    • Rash, skin    • Ventricular tachycardia        Past Surgical History:   Procedure  "Laterality Date   • BACK SURGERY     • CARDIAC DEFIBRILLATOR PLACEMENT     • CARDIAC ELECTROPHYSIOLOGY PROCEDURE Left 2/20/2017    Procedure: Device Replacement   ICD GEN CHG MEDTRONIC;  Surgeon: Guido Michael MD;  Location: [facility] INVASIVE LOCATION;  Service:    • CATARACT EXTRACTION     • HERNIA REPAIR     • VEIN LIGATION AND STRIPPING             Review of Systems   Constitution: Positive for malaise/fatigue. Negative for fever.   HENT: Negative for ear pain, hearing loss, nosebleeds and sore throat.    Eyes: Negative for double vision, pain, vision loss in left eye, vision loss in right eye and visual disturbance.   Cardiovascular: Positive for dyspnea on exertion and leg swelling. Negative for claudication and orthopnea.   Respiratory: Positive for shortness of breath. Negative for cough, snoring and wheezing.    Endocrine: Negative for cold intolerance, heat intolerance and polyuria.   Skin: Negative for color change, itching and rash.   Musculoskeletal: Negative for joint pain, joint swelling and muscle cramps.   Gastrointestinal: Negative for abdominal pain, diarrhea, melena, nausea and vomiting.   Genitourinary: Negative for bladder incontinence and hematuria.   Neurological: Negative for excessive daytime sleepiness, dizziness, light-headedness, paresthesias and seizures.   Psychiatric/Behavioral: Negative for depression. The patient is not nervous/anxious.    All other systems reviewed and are negative.    All other systems reviewed and are negative    No Known Allergies    All aspects of family and social history reviewed.          Objective:     Vitals:    11/03/17 1100   BP: 118/70   BP Location: Left arm   Pulse: 79   Weight: 200 lb (90.7 kg)   Height: 71\" (180.3 cm)     Body mass index is 27.89 kg/(m^2).    PHYSICAL EXAM:  Physical Exam   Constitutional: He is oriented to person, place, and time. He appears well-developed and well-nourished.   HENT:   Head: Normocephalic.   Eyes: Pupils " are equal, round, and reactive to light.   Neck: Normal range of motion. No JVD present. Carotid bruit is not present. No thyromegaly present.   Cardiovascular: Normal rate, regular rhythm, S1 normal, S2 normal, normal heart sounds and intact distal pulses.  Exam reveals no friction rub.    No murmur heard.  Pulmonary/Chest: Effort normal and breath sounds normal. He has no rales.   Abdominal: Soft. Bowel sounds are normal.   Musculoskeletal: He exhibits no edema.   Neurological: He is alert and oriented to person, place, and time.   Skin: Skin is warm, dry and intact. No erythema.   Psychiatric: He has a normal mood and affect.   Vitals reviewed.        ECG 12 Lead  Date/Time: 11/3/2017 11:47 AM  Performed by: RISHI WILLS  Authorized by: RISHI WILLS   Comparison: compared with previous ECG   Rhythm: sinus rhythm  Ectopy: PVCs  Rate: normal  BPM: 79  Conduction: non-specific intraventricular conduction delay  ST Segments: ST segments normal  QRS axis: normal  Clinical impression: abnormal ECG  Comments: Indication: cardiomyopathy                  Assessment:       Diagnosis Plan   1. Cardiomyopathy, dilated, nonischemic  Basic Metabolic Panel        Orders Placed This Encounter   Procedures   • Basic Metabolic Panel     Standing Status:   Future     Standing Expiration Date:   11/3/2018       Current Outpatient Prescriptions   Medication Sig Dispense Refill   • ALPRAZolam (XANAX) 0.5 MG tablet Take 0.5 mg by mouth 3 (Three) Times a Day.     • Carboxymethylcellul-Glycerin (CLEAR EYES FOR DRY EYES OP) Apply 1 drop to eye 2 (Two) Times a Day.     • finasteride (PROSCAR) 5 MG tablet Take 5 mg by mouth Daily.     • Magnesium Oxide 400 MG capsule Take 400 mg by mouth Daily. 90 each 1   • metoprolol tartrate (LOPRESSOR) 100 MG tablet Take 1/2 tablet 2 x per day (Patient taking differently: Take 1/2 tablet 3x per day) 180 tablet 1   • sacubitril-valsartan (ENTRESTO) 24-26 MG tablet Take 1 tablet by mouth 2 (Two)  Times a Day. 60 tablet 2   • tamsulosin (FLOMAX) 0.4 MG capsule 24 hr capsule Take 0.4 mg by mouth 2 (Two) Times a Day.     • furosemide (LASIX) 20 MG tablet Take 1 tablet by mouth Daily. 30 tablet 1   • spironolactone (ALDACTONE) 25 MG tablet Take 0.5 tablets by mouth Daily. 15 tablet 1     No current facility-administered medications for this visit.             Plan:       1.  Nonischemic cardiomyopathy-EF 30%.  Is currently on Entresto.  Still having issues with weight gain.  I'm in a stop his hydrochlorothiazide.  I will start Lasix 20 mg daily and start spironolactone 12.5 mg daily.  He will call if he continues to gain weight.  If he does, I would increase the furosemide.  I did not start potassium due to the spironolactone.  He will get a BMP in 2 weeks.    Heart Failure  Assessment  • NYHA class III-B - There is significant limitation of physical activity. The patient is comfortable at rest, but minimal activity causes fatigue, palpitations or shortness of breath.  • Beta blocker prescribed  • Diuretics prescribed  • Angiotensin receptor blocker (ARB) prescribed  • Left ventricular function is severely reduced by qualitative assessment    Plan  • The patient has received heart failure education on the following topics: dietary sodium restriction, medication instructions, minimizing or avoiding NSAID use, symptom management, physical activity, weight monitoring and minimizing alcohol intake  • The heart failure care plan was discussed with the patient today including: up-titrating HF medications    Subjective/Objective  • The patient reports dyspnea    • Physical exam findings negative for rales, peripheral edema and elevated JVP.  • The patient has an ICD implant        2.  Ventricular tachycardia-increased palpitations on decreased dose of metoprolol.  Currently taking metoprolol 50 mg 3 times a day.    25 minutes face to face time with greater than 50% used for education regarding heart failure and  medications    Follow up in office in 2 weeks    As always, it has been a pleasure to participate in this patient's care.      Sincerely,      ALYSON Hickman

## 2017-11-20 ENCOUNTER — OFFICE VISIT (OUTPATIENT)
Dept: CARDIOLOGY | Facility: CLINIC | Age: 77
End: 2017-11-20

## 2017-11-20 ENCOUNTER — LAB (OUTPATIENT)
Dept: LAB | Facility: HOSPITAL | Age: 77
End: 2017-11-20

## 2017-11-20 VITALS
HEIGHT: 71 IN | WEIGHT: 196 LBS | SYSTOLIC BLOOD PRESSURE: 128 MMHG | HEART RATE: 79 BPM | DIASTOLIC BLOOD PRESSURE: 92 MMHG | BODY MASS INDEX: 27.44 KG/M2

## 2017-11-20 DIAGNOSIS — I42.0 CARDIOMYOPATHY, DILATED, NONISCHEMIC (HCC): Primary | ICD-10-CM

## 2017-11-20 DIAGNOSIS — I42.0 CARDIOMYOPATHY, DILATED, NONISCHEMIC (HCC): ICD-10-CM

## 2017-11-20 LAB
ANION GAP SERPL CALCULATED.3IONS-SCNC: 12.2 MMOL/L
BUN BLD-MCNC: 14 MG/DL (ref 8–23)
BUN/CREAT SERPL: 14.1 (ref 7–25)
CALCIUM SPEC-SCNC: 10.1 MG/DL (ref 8.6–10.5)
CHLORIDE SERPL-SCNC: 105 MMOL/L (ref 98–107)
CO2 SERPL-SCNC: 27.8 MMOL/L (ref 22–29)
CREAT BLD-MCNC: 0.99 MG/DL (ref 0.76–1.27)
GFR SERPL CREATININE-BSD FRML MDRD: 73 ML/MIN/1.73
GLUCOSE BLD-MCNC: 104 MG/DL (ref 65–99)
POTASSIUM BLD-SCNC: 4 MMOL/L (ref 3.5–5.2)
SODIUM BLD-SCNC: 145 MMOL/L (ref 136–145)

## 2017-11-20 PROCEDURE — 36415 COLL VENOUS BLD VENIPUNCTURE: CPT

## 2017-11-20 PROCEDURE — 80048 BASIC METABOLIC PNL TOTAL CA: CPT

## 2017-11-20 PROCEDURE — 93000 ELECTROCARDIOGRAM COMPLETE: CPT | Performed by: NURSE PRACTITIONER

## 2017-11-20 PROCEDURE — 99213 OFFICE O/P EST LOW 20 MIN: CPT | Performed by: NURSE PRACTITIONER

## 2017-11-20 RX ORDER — AMOXICILLIN 500 MG/1
CAPSULE ORAL
Refills: 1 | COMMUNITY
Start: 2017-11-15 | End: 2019-11-20

## 2017-11-20 RX ORDER — FUROSEMIDE 40 MG/1
40 TABLET ORAL DAILY
Qty: 30 TABLET | Refills: 11 | Status: SHIPPED | OUTPATIENT
Start: 2017-11-20 | End: 2017-11-22 | Stop reason: SDUPTHER

## 2017-11-20 NOTE — PROGRESS NOTES
Date of Office Visit: 2017  Encounter Provider: ALYSON Hickman  Place of Service: [facility] CARDIOLOGY  Patient Name: Harvey Leyva  :1940    Chief Complaint   Patient presents with   • Cardiomyopathy     2 week follow up   :     HPI: Harvey Leyva is a 77 y.o. male comes in today for 2 week follow up.     He has a history of a nonischemic cardiomyopathy as well as ventricular tachycardia.  He does have an ICD implanted..       He was in to see Dr. Michael in 2017 complaining of some edema and palpitations.  Dr. Michael was concerned about his symptoms.  He had an echocardiogram showing his EF 30%, mild aortic valve regurgitation, mild tricuspid valve regurgitation.  He was attempted to be started on Entresto and felt very lightheaded and dizzy and fatigued.  His blood pressure was low in the office in follow-up.     His hydrochlorothiazide was decreased.  He does report he gains weight and fluid is feeling takes half hydrochlorothiazide.  His metoprolol was decreased to 50 mg daily.    Lastly, the patient came in for follow-up.  I stopped his hydrochlorothiazide and switched him to Lasix.  Also added low-dose spironolactone.    He comes in today and reports that he's feeling better.  The Lasix does help with fluid.  He goes more during the day and not as much at nighttime.  He does not complain of any dizziness.  He still has shortness of breath on exertion.  He has to walk very slow.  He does report if he is out and about for more than 45 hours he will have significant edema of his lower extremities.  His feet will often turn red at times as well.  He denies chest pain.  Denies orthopnea or PND.  He had lab work drawn today that stable.    Past Medical History:   Diagnosis Date   • Cardiomyopathy     nonischemic   • Difficulty breathing    • Dilated cardiomyopathy    • Dizzy    • Dyspnea    • Fatigue    • Hyperlipidemia    • Hypotension     due to drugs  "  • Lightheaded    • Malaise and fatigue    • Rash, skin    • Ventricular tachycardia        Past Surgical History:   Procedure Laterality Date   • BACK SURGERY     • CARDIAC DEFIBRILLATOR PLACEMENT     • CARDIAC ELECTROPHYSIOLOGY PROCEDURE Left 2/20/2017    Procedure: Device Replacement   ICD GEN CHG MEDTRONIC;  Surgeon: Guido Michael MD;  Location: [facility] INVASIVE LOCATION;  Service:    • CATARACT EXTRACTION     • HERNIA REPAIR     • VEIN LIGATION AND STRIPPING             Review of Systems   Constitution: Positive for malaise/fatigue. Negative for fever.   HENT: Negative for ear pain, hearing loss, nosebleeds and sore throat.    Eyes: Negative for double vision, pain, vision loss in left eye, vision loss in right eye and visual disturbance.   Cardiovascular: Positive for dyspnea on exertion and leg swelling. Negative for claudication and orthopnea.   Respiratory: Negative for cough, shortness of breath, snoring and wheezing.    Endocrine: Negative for cold intolerance, heat intolerance and polyuria.   Skin: Negative for color change, itching and rash.   Musculoskeletal: Negative for joint pain, joint swelling and muscle cramps.   Gastrointestinal: Negative for abdominal pain, diarrhea, melena, nausea and vomiting.   Genitourinary: Negative for bladder incontinence and hematuria.   Neurological: Negative for excessive daytime sleepiness, dizziness, light-headedness, paresthesias and seizures.   Psychiatric/Behavioral: Negative for depression. The patient is not nervous/anxious.    All other systems reviewed and are negative.    All other systems reviewed and are negative    No Known Allergies    All aspects of family and social history reviewed.          Objective:     Vitals:    11/20/17 1451   BP: 128/92   BP Location: Left arm   Pulse: 79   Weight: 196 lb (88.9 kg)   Height: 71\" (180.3 cm)     Body mass index is 27.34 kg/(m^2).    PHYSICAL EXAM:  Physical Exam   Constitutional: He is oriented to " person, place, and time. He appears well-developed and well-nourished.   HENT:   Head: Normocephalic.   Eyes: Pupils are equal, round, and reactive to light.   Neck: Normal range of motion. No JVD present. Carotid bruit is not present. No thyromegaly present.   Cardiovascular: Normal rate, regular rhythm, S1 normal, S2 normal, normal heart sounds and intact distal pulses.  Exam reveals no friction rub.    No murmur heard.  Pulmonary/Chest: Effort normal and breath sounds normal. He has no rales.   Abdominal: Soft. Bowel sounds are normal.   Musculoskeletal: He exhibits no edema.   Neurological: He is alert and oriented to person, place, and time.   Skin: Skin is warm, dry and intact. No erythema.   Psychiatric: He has a normal mood and affect.   Vitals reviewed.        ECG 12 Lead  Date/Time: 11/20/2017 4:08 PM  Performed by: RISHI WILLS  Authorized by: RISHI WILLS   Comparison: compared with previous ECG from 10/4/2017  Similar to previous ECG  Rhythm: sinus rhythm  Ectopy: PVCs  Rate: normal  BPM: 82  Conduction: non-specific intraventricular conduction delay  QRS axis: normal  Clinical impression: abnormal ECG  Comments: Indication: Cardiomyopathy                Assessment:       Diagnosis Plan   1. Cardiomyopathy, dilated, nonischemic  ECG 12 Lead        Orders Placed This Encounter   Procedures   • ECG 12 Lead     This order was created via procedure documentation       Current Outpatient Prescriptions   Medication Sig Dispense Refill   • ALPRAZolam (XANAX) 0.5 MG tablet Take 0.5 mg by mouth 3 (Three) Times a Day.     • Carboxymethylcellulose Sodium (RETAINE C[facility] OP) Apply 1 drop to eye 3 (Three) Times a Day.     • finasteride (PROSCAR) 5 MG tablet Take 5 mg by mouth Daily.     • Magnesium Oxide 400 MG capsule Take 400 mg by mouth Daily. 90 each 1   • metoprolol tartrate (LOPRESSOR) 100 MG tablet Take 1/2 tablet 2 x per day (Patient taking differently: Take 1/2 tablet 3x per day) 180 tablet 1   •  sacubitril-valsartan (ENTRESTO) 24-26 MG tablet Take 1 tablet by mouth 2 (Two) Times a Day. 60 tablet 2   • spironolactone (ALDACTONE) 25 MG tablet Take 0.5 tablets by mouth Daily. 15 tablet 1   • tamsulosin (FLOMAX) 0.4 MG capsule 24 hr capsule Take 0.4 mg by mouth 2 (Two) Times a Day.     • amoxicillin (AMOXIL) 500 MG capsule prophylaxis during oral procedures  1   • furosemide (LASIX) 40 MG tablet Take 1 tablet by mouth Daily. 30 tablet 11     No current facility-administered medications for this visit.             Plan:       1. Heart Failure  Assessment  • NYHA class III-B - There is significant limitation of physical activity. The patient is comfortable at rest, but minimal activity causes fatigue, palpitations or shortness of breath.  • Beta blocker prescribed  • Diuretics prescribed  • Angiotensin receptor blocker (ARB) prescribed  • Left ventricular function is severely reduced by qualitative assessment    Plan  • The patient has received heart failure education on the following topics: dietary sodium restriction, medication instructions, minimizing or avoiding NSAID use, symptom management, physical activity, weight monitoring and minimizing alcohol intake  • The heart failure care plan was discussed with the patient today including: up-titrating HF medications    Subjective/Objective    • Physical exam findings negative for rales and elevated JVP.  • The patient has an ICD implant      Pt feeling better. Okay to take furosemide 40 mg daily. If too much, reduce to 20 mg. Lab work stable. Advised compression stockings.     Follow up in office as scheduled in February.     As always, it has been a pleasure to participate in this patient's care.      Sincerely,      ALYSON Hickman

## 2017-11-21 ENCOUNTER — TELEPHONE (OUTPATIENT)
Dept: CARDIOLOGY | Facility: CLINIC | Age: 77
End: 2017-11-21

## 2017-11-21 NOTE — TELEPHONE ENCOUNTER
Received Refill request for Lasix 20mg 1 bid to replace 40 mg daily.  Please advise if OK to refill.  Your last office note documented 40 mg daily.     jarett King, [phone] / ADRY

## 2017-11-22 RX ORDER — FUROSEMIDE 20 MG/1
20 TABLET ORAL 2 TIMES DAILY
Qty: 60 TABLET | Refills: 3 | Status: SHIPPED | OUTPATIENT
Start: 2017-11-22 | End: 2018-04-16 | Stop reason: SDUPTHER

## 2017-12-04 RX ORDER — SACUBITRIL AND VALSARTAN 24; 26 MG/1; MG/1
TABLET, FILM COATED ORAL
Qty: 60 TABLET | Refills: 2 | Status: SHIPPED | OUTPATIENT
Start: 2017-12-04 | End: 2018-03-15 | Stop reason: SDUPTHER

## 2017-12-06 ENCOUNTER — CLINICAL SUPPORT NO REQUIREMENTS (OUTPATIENT)
Dept: CARDIOLOGY | Facility: CLINIC | Age: 77
End: 2017-12-06

## 2017-12-06 DIAGNOSIS — I47.20 VENTRICULAR TACHYCARDIA (HCC): Primary | ICD-10-CM

## 2017-12-06 DIAGNOSIS — I49.5 SICK SINUS SYNDROME (HCC): ICD-10-CM

## 2017-12-06 PROCEDURE — 93295 DEV INTERROG REMOTE 1/2/MLT: CPT | Performed by: INTERNAL MEDICINE

## 2017-12-06 PROCEDURE — 93297 REM INTERROG DEV EVAL ICPMS: CPT | Performed by: INTERNAL MEDICINE

## 2017-12-06 PROCEDURE — 93296 REM INTERROG EVL PM/IDS: CPT | Performed by: INTERNAL MEDICINE

## 2017-12-26 RX ORDER — SPIRONOLACTONE 25 MG/1
TABLET ORAL
Qty: 15 TABLET | Refills: 0 | Status: SHIPPED | OUTPATIENT
Start: 2017-12-26 | End: 2018-01-30 | Stop reason: SDUPTHER

## 2018-01-30 RX ORDER — SPIRONOLACTONE 25 MG/1
12.5 TABLET ORAL DAILY
Qty: 15 TABLET | Refills: 3 | Status: SHIPPED | OUTPATIENT
Start: 2018-01-30 | End: 2018-04-16 | Stop reason: SDUPTHER

## 2018-02-08 ENCOUNTER — CLINICAL SUPPORT NO REQUIREMENTS (OUTPATIENT)
Dept: CARDIOLOGY | Facility: CLINIC | Age: 78
End: 2018-02-08

## 2018-02-08 ENCOUNTER — OFFICE VISIT (OUTPATIENT)
Dept: CARDIOLOGY | Facility: CLINIC | Age: 78
End: 2018-02-08

## 2018-02-08 VITALS
DIASTOLIC BLOOD PRESSURE: 82 MMHG | HEIGHT: 71 IN | HEART RATE: 81 BPM | BODY MASS INDEX: 28 KG/M2 | SYSTOLIC BLOOD PRESSURE: 120 MMHG | WEIGHT: 200 LBS

## 2018-02-08 DIAGNOSIS — I42.0 CARDIOMYOPATHY, DILATED, NONISCHEMIC (HCC): Primary | ICD-10-CM

## 2018-02-08 DIAGNOSIS — I42.8 NONISCHEMIC CARDIOMYOPATHY (HCC): Primary | ICD-10-CM

## 2018-02-08 DIAGNOSIS — I47.20 VENTRICULAR TACHYCARDIA (HCC): ICD-10-CM

## 2018-02-08 PROCEDURE — 99214 OFFICE O/P EST MOD 30 MIN: CPT | Performed by: INTERNAL MEDICINE

## 2018-02-08 PROCEDURE — 93283 PRGRMG EVAL IMPLANTABLE DFB: CPT | Performed by: INTERNAL MEDICINE

## 2018-02-08 PROCEDURE — 93290 INTERROG DEV EVAL ICPMS IP: CPT | Performed by: INTERNAL MEDICINE

## 2018-02-08 RX ORDER — METOPROLOL TARTRATE 50 MG/1
50 TABLET, FILM COATED ORAL 3 TIMES DAILY
COMMUNITY
End: 2019-03-18 | Stop reason: SDUPTHER

## 2018-02-08 NOTE — PROGRESS NOTES
Date of Office Visit: 2018  Encounter Provider: Guido Michael MD  Place of Service: [facility] CARDIOLOGY  Patient Name: Harvey Leyva  :1940      Chief Complaint   Patient presents with   • Cardiomyopathy     History of Present Illness    The patient is a 77-year-old white male with a history of nonischemic dilated cardiomyopathy.  He also has a history of ventricular tachycardia as a result.  He has an AICD implant.  He has been started on Entresto because of his poor left ventricular function.  At the present time he appears to be tolerating the drug without difficulty.  At times however he will complain of some lightheadedness but is not very significant.  He does not complain of any chest pain, shortness of breath or peripheral edema.  He did experience some at edema episodes but since he's been wearing LOY hose that has improved greatly.    Past Medical History:   Diagnosis Date   • Cardiomyopathy     nonischemic   • Difficulty breathing    • Dilated cardiomyopathy    • Dizzy    • Dyspnea    • Fatigue    • Hyperlipidemia    • Hypotension     due to drugs   • Lightheaded    • Malaise and fatigue    • Rash, skin    • Ventricular tachycardia          Past Surgical History:   Procedure Laterality Date   • BACK SURGERY     • CARDIAC DEFIBRILLATOR PLACEMENT     • CARDIAC ELECTROPHYSIOLOGY PROCEDURE Left 2017    Procedure: Device Replacement   ICD GEN CHG MEDTRONIC;  Surgeon: Guido Mihcael MD;  Location: [facility] INVASIVE LOCATION;  Service:    • CATARACT EXTRACTION     • HERNIA REPAIR     • VEIN LIGATION AND STRIPPING             Current Outpatient Prescriptions:   •  ALPRAZolam (XANAX) 0.5 MG tablet, Take 0.5 mg by mouth 3 (Three) Times a Day., Disp: , Rfl:   •  amoxicillin (AMOXIL) 500 MG capsule, prophylaxis during oral procedures, Disp: , Rfl: 1  •  Carboxymethylcellulose Sodium (RETAINE CMC OP), Apply 1 drop to eye 3 (Three) Times a Day., Disp: , Rfl:  "  •  ENTRESTO 24-26 MG tablet, TAKE 1 TABLET BY MOUTH TWICE DAILY, Disp: 60 tablet, Rfl: 2  •  finasteride (PROSCAR) 5 MG tablet, Take 5 mg by mouth Daily., Disp: , Rfl:   •  furosemide (LASIX) 20 MG tablet, Take 1 tablet by mouth 2 (Two) Times a Day., Disp: 60 tablet, Rfl: 3  •  Magnesium Oxide 400 MG capsule, Take 400 mg by mouth Daily., Disp: 90 each, Rfl: 1  •  metoprolol tartrate (LOPRESSOR) 100 MG tablet, Take 50 mg by mouth 3 (Three) Times a Day., Disp: , Rfl:   •  spironolactone (ALDACTONE) 25 MG tablet, Take 0.5 tablets by mouth Daily., Disp: 15 tablet, Rfl: 3  •  tamsulosin (FLOMAX) 0.4 MG capsule 24 hr capsule, Take 0.4 mg by mouth 2 (Two) Times a Day., Disp: , Rfl:       Social History     Social History   • Marital status:      Spouse name: N/A   • Number of children: N/A   • Years of education: N/A     Occupational History   • Not on file.     Social History Main Topics   • Smoking status: Former Smoker   • Smokeless tobacco: Never Used   • Alcohol use No      Comment: caffeine use   • Drug use: No   • Sexual activity: Defer     Other Topics Concern   • Not on file     Social History Narrative         Review of Systems   Constitution: Positive for malaise/fatigue.   HENT: Negative.    Eyes: Negative.    Cardiovascular: Positive for palpitations.   Respiratory: Negative.    Endocrine: Negative.    Skin: Negative.    Musculoskeletal: Negative.    Gastrointestinal: Negative.    Neurological: Positive for light-headedness.   Psychiatric/Behavioral: Negative.        Procedures    Procedures       Objective:    /82  Pulse 81  Ht 180.3 cm (71\")  Wt 90.7 kg (200 lb)  BMI 27.89 kg/m2        Physical Exam   Constitutional: He is oriented to person, place, and time. He appears well-developed and well-nourished.   HENT:   Head: Normocephalic.   Eyes: Pupils are equal, round, and reactive to light.   Neck: Normal range of motion. No JVD present. Carotid bruit is not present. No thyromegaly present. "   Cardiovascular: Normal rate, regular rhythm, S1 normal, S2 normal, normal heart sounds and intact distal pulses.  Exam reveals no gallop and no friction rub.    No murmur heard.  Pulmonary/Chest: Effort normal and breath sounds normal.   Abdominal: Soft. Bowel sounds are normal.   Musculoskeletal: He exhibits no edema.   Neurological: He is alert and oriented to person, place, and time.   Skin: Skin is warm, dry and intact. No erythema.   Psychiatric: He has a normal mood and affect.   Vitals reviewed.          Assessment:       Diagnosis Plan   1. Cardiomyopathy, dilated, nonischemic     2. Ventricular tachycardia              Plan:       The patient appears to be reasonably stable at the present time.  No changes in medication are going to be made.  We reviewed his current situation as well as his medication and physical activity.  I plan to see him back in about 4 months.

## 2018-02-15 RX ORDER — METOPROLOL TARTRATE 100 MG/1
TABLET ORAL
Qty: 180 TABLET | Refills: 1 | Status: SHIPPED | OUTPATIENT
Start: 2018-02-15 | End: 2018-09-24 | Stop reason: SDUPTHER

## 2018-03-15 RX ORDER — SACUBITRIL AND VALSARTAN 24; 26 MG/1; MG/1
TABLET, FILM COATED ORAL
Qty: 60 TABLET | Refills: 2 | Status: SHIPPED | OUTPATIENT
Start: 2018-03-15 | End: 2018-04-16 | Stop reason: SDUPTHER

## 2018-04-02 RX ORDER — RAMIPRIL 2.5 MG/1
CAPSULE ORAL
Qty: 90 CAPSULE | Refills: 0 | Status: SHIPPED | OUTPATIENT
Start: 2018-04-02 | End: 2018-06-13

## 2018-04-16 RX ORDER — FUROSEMIDE 20 MG/1
20 TABLET ORAL 2 TIMES DAILY
Qty: 180 TABLET | Refills: 1 | Status: SHIPPED | OUTPATIENT
Start: 2018-04-16 | End: 2018-10-08 | Stop reason: SDUPTHER

## 2018-04-16 RX ORDER — SPIRONOLACTONE 25 MG/1
12.5 TABLET ORAL DAILY
Qty: 45 TABLET | Refills: 1 | Status: SHIPPED | OUTPATIENT
Start: 2018-04-16 | End: 2018-10-08 | Stop reason: SDUPTHER

## 2018-05-12 ENCOUNTER — CLINICAL SUPPORT NO REQUIREMENTS (OUTPATIENT)
Dept: CARDIOLOGY | Facility: CLINIC | Age: 78
End: 2018-05-12

## 2018-05-12 DIAGNOSIS — I42.9 CARDIOMYOPATHY, UNSPECIFIED TYPE (HCC): Primary | ICD-10-CM

## 2018-05-12 PROCEDURE — 93297 REM INTERROG DEV EVAL ICPMS: CPT | Performed by: INTERNAL MEDICINE

## 2018-05-12 PROCEDURE — 93295 DEV INTERROG REMOTE 1/2/MLT: CPT | Performed by: INTERNAL MEDICINE

## 2018-05-12 PROCEDURE — 93296 REM INTERROG EVL PM/IDS: CPT | Performed by: INTERNAL MEDICINE

## 2018-06-13 ENCOUNTER — OFFICE VISIT (OUTPATIENT)
Dept: CARDIOLOGY | Facility: CLINIC | Age: 78
End: 2018-06-13

## 2018-06-13 VITALS
RESPIRATION RATE: 16 BRPM | BODY MASS INDEX: 28.28 KG/M2 | SYSTOLIC BLOOD PRESSURE: 124 MMHG | WEIGHT: 202 LBS | HEART RATE: 72 BPM | DIASTOLIC BLOOD PRESSURE: 82 MMHG | HEIGHT: 71 IN

## 2018-06-13 DIAGNOSIS — I42.0 CARDIOMYOPATHY, DILATED, NONISCHEMIC (HCC): Primary | ICD-10-CM

## 2018-06-13 DIAGNOSIS — I47.20 VENTRICULAR TACHYCARDIA (HCC): ICD-10-CM

## 2018-06-13 PROCEDURE — 93000 ELECTROCARDIOGRAM COMPLETE: CPT | Performed by: INTERNAL MEDICINE

## 2018-06-13 PROCEDURE — 99214 OFFICE O/P EST MOD 30 MIN: CPT | Performed by: INTERNAL MEDICINE

## 2018-06-13 NOTE — PROGRESS NOTES
Date of Office Visit: 2018  Encounter Provider: Guido Michael MD  Place of Service: [facility] CARDIOLOGY  Patient Name: Harvey Leyva  :1940      Chief Complaint   Patient presents with   • Cardiomyopathy   • Irregular Heart Beat     History of Present Illness  The patient is an 78-year-old white male with a history of a dilated nonischemic cardiomyopathy as well as ventricular tachycardia.  Because of poor left ventricular function as well as significant symptoms he was started on Entresto.  He's also had an AICD implanted because of his ventricular tachycardia.  His last ejection fraction was at 30%.    Symptomatically the patient feels much better.  His exertional capacity has improved significantly.  He does not complain of any chest pain.  He has some palpitations but they are much better than they have been.  He still has some fatigue issues but improved      Past Medical History:   Diagnosis Date   • Cardiomyopathy     nonischemic   • Difficulty breathing    • Dilated cardiomyopathy    • Dizzy    • Dyspnea    • Fatigue    • Hyperlipidemia    • Hypotension     due to drugs   • Lightheaded    • Malaise and fatigue    • Rash, skin    • Ventricular tachycardia          Past Surgical History:   Procedure Laterality Date   • BACK SURGERY     • CARDIAC DEFIBRILLATOR PLACEMENT     • CARDIAC ELECTROPHYSIOLOGY PROCEDURE Left 2017    Procedure: Device Replacement   ICD GEN CHG MEDTRONIC;  Surgeon: Guido Michael MD;  Location: [facility] INVASIVE LOCATION;  Service:    • CATARACT EXTRACTION     • HERNIA REPAIR     • VEIN LIGATION AND STRIPPING             Current Outpatient Prescriptions:   •  ALPRAZolam (XANAX) 0.5 MG tablet, Take 0.5 mg by mouth 3 (Three) Times a Day., Disp: , Rfl:   •  amoxicillin (AMOXIL) 500 MG capsule, prophylaxis during oral procedures, Disp: , Rfl: 1  •  Carboxymethylcellulose Sodium (RETAINE CMC OP), Apply 1 drop to eye 3 (Three) Times  a Day., Disp: , Rfl:   •  finasteride (PROSCAR) 5 MG tablet, Take 5 mg by mouth Daily., Disp: , Rfl:   •  furosemide (LASIX) 20 MG tablet, Take 1 tablet by mouth 2 (Two) Times a Day., Disp: 180 tablet, Rfl: 1  •  Magnesium Oxide 400 MG capsule, Take 400 mg by mouth Daily., Disp: 90 each, Rfl: 1  •  metoprolol tartrate (LOPRESSOR) 100 MG tablet, Take 50 mg by mouth 3 (Three) Times a Day., Disp: , Rfl:   •  metoprolol tartrate (LOPRESSOR) 100 MG tablet, TAKE 1 TABLET BY MOUTH 2 (TWO) TIMES A DAY., Disp: 180 tablet, Rfl: 1  •  sacubitril-valsartan (ENTRESTO) 24-26 MG tablet, Take 1 tablet by mouth 2 (Two) Times a Day., Disp: 180 tablet, Rfl: 1  •  spironolactone (ALDACTONE) 25 MG tablet, Take 0.5 tablets by mouth Daily., Disp: 45 tablet, Rfl: 1  •  tamsulosin (FLOMAX) 0.4 MG capsule 24 hr capsule, Take 0.4 mg by mouth 2 (Two) Times a Day., Disp: , Rfl:       Social History     Social History   • Marital status:      Spouse name: N/A   • Number of children: N/A   • Years of education: N/A     Occupational History   • Not on file.     Social History Main Topics   • Smoking status: Former Smoker   • Smokeless tobacco: Never Used   • Alcohol use No      Comment: caffeine use   • Drug use: No   • Sexual activity: Defer     Other Topics Concern   • Not on file     Social History Narrative   • No narrative on file         Review of Systems   Constitution: Positive for malaise/fatigue (improved).   HENT: Negative.    Eyes: Negative.    Cardiovascular: Negative.    Respiratory: Negative.    Endocrine: Negative.    Skin: Negative.    Musculoskeletal: Negative.    Gastrointestinal: Negative.    Neurological: Negative.    Psychiatric/Behavioral: Negative.        Procedures      ECG 12 Lead  Date/Time: 6/13/2018 12:55 PM  Performed by: STEVAN PICKENS  Authorized by: STEVAN PICKENS   Comparison: compared with previous ECG from 11/20/2017  Similar to previous ECG  Rhythm: sinus rhythm  Ectopy: unifocal PVCs  Rate:  "normal  QRS axis: normal                  Objective:    /82 (BP Location: Right arm, Patient Position: Sitting, Cuff Size: Adult)   Pulse 72   Resp 16   Ht 180.3 cm (71\")   Wt 91.6 kg (202 lb)   BMI 28.17 kg/m²         Physical Exam   Constitutional: He is oriented to person, place, and time. He appears well-developed and well-nourished.   HENT:   Head: Normocephalic.   Eyes: Pupils are equal, round, and reactive to light.   Neck: Normal range of motion. No JVD present. Carotid bruit is not present. No thyromegaly present.   Cardiovascular: Normal rate, regular rhythm, S1 normal, S2 normal, normal heart sounds and intact distal pulses.  Exam reveals no gallop and no friction rub.    No murmur heard.  Pulmonary/Chest: Effort normal and breath sounds normal.   Abdominal: Soft. Bowel sounds are normal.   Musculoskeletal: He exhibits no edema.   Neurological: He is alert and oriented to person, place, and time.   Skin: Skin is warm, dry and intact. No erythema.   Psychiatric: He has a normal mood and affect.   Vitals reviewed.          Assessment:      1.  Dilated nonischemic cardiomyopathy: Ejection fraction 30%, class II symptoms.  No signs of heart failure    2.  Ventricular tachycardia: Status post AICD.  Chronic PVCs but no V. tach present     Continue the same.  We'll see back in 6 months  "

## 2018-08-16 ENCOUNTER — CLINICAL SUPPORT NO REQUIREMENTS (OUTPATIENT)
Dept: CARDIOLOGY | Facility: CLINIC | Age: 78
End: 2018-08-16

## 2018-08-16 DIAGNOSIS — I42.9 CARDIOMYOPATHY, UNSPECIFIED TYPE (HCC): Primary | ICD-10-CM

## 2018-08-16 PROCEDURE — 93283 PRGRMG EVAL IMPLANTABLE DFB: CPT | Performed by: INTERNAL MEDICINE

## 2018-08-16 PROCEDURE — 93290 INTERROG DEV EVAL ICPMS IP: CPT | Performed by: INTERNAL MEDICINE

## 2018-09-24 RX ORDER — METOPROLOL TARTRATE 100 MG/1
TABLET ORAL
Qty: 180 TABLET | Refills: 1 | Status: SHIPPED | OUTPATIENT
Start: 2018-09-24 | End: 2018-11-13 | Stop reason: SDUPTHER

## 2018-10-08 RX ORDER — SPIRONOLACTONE 25 MG/1
12.5 TABLET ORAL DAILY
Qty: 45 TABLET | Refills: 1 | Status: SHIPPED | OUTPATIENT
Start: 2018-10-08 | End: 2019-03-31 | Stop reason: SDUPTHER

## 2018-10-08 RX ORDER — FUROSEMIDE 20 MG/1
20 TABLET ORAL 2 TIMES DAILY
Qty: 180 TABLET | Refills: 1 | Status: SHIPPED | OUTPATIENT
Start: 2018-10-08 | End: 2019-04-02 | Stop reason: SDUPTHER

## 2018-11-13 ENCOUNTER — OFFICE VISIT (OUTPATIENT)
Dept: SURGERY | Facility: CLINIC | Age: 78
End: 2018-11-13

## 2018-11-13 VITALS — WEIGHT: 196 LBS | OXYGEN SATURATION: 95 % | HEIGHT: 71 IN | BODY MASS INDEX: 27.44 KG/M2 | HEART RATE: 85 BPM

## 2018-11-13 DIAGNOSIS — N62 GYNECOMASTIA, MALE: ICD-10-CM

## 2018-11-13 DIAGNOSIS — R10.9 ABDOMINAL PAIN, UNSPECIFIED ABDOMINAL LOCATION: Primary | ICD-10-CM

## 2018-11-13 PROCEDURE — 99203 OFFICE O/P NEW LOW 30 MIN: CPT | Performed by: SURGERY

## 2018-11-13 RX ORDER — ONDANSETRON 4 MG/1
4 TABLET, FILM COATED ORAL DAILY PRN
Qty: 30 TABLET | Refills: 1 | Status: SHIPPED | OUTPATIENT
Start: 2018-11-13 | End: 2019-07-17

## 2018-11-13 NOTE — PROGRESS NOTES
CC: Nausea , diarrhea and abdominal pain     HPI: The patient is a very pleasant 78-year-old male that is here today for evaluation of nausea, diarrhea and abdominal pain.  The patient reports intermittent episodes of nausea that usually happened at night.  This has not been associated with any type of food intake.  Water makes nausea better and nothing makes it worse.  He has not taken any antinausea medication. This has been associated with heartburn and coughing.  Spicy food makes symptoms worse. He has has been having loose stools that started several years ago.  He describes it as 3 of 4 episodes of loose stools that are followed by watery stools.  He has taken Imodium for this with good response. He denies any recent change of the shape of the stool but he has noticed darkening of the colon or the stool.  Denies any rectal bleeding.  He underwent colonoscopy in 2010 and he does not recall having polyps . This has been associated with pain located over the left flank area.  The pain is more of a discomfort that is located over the left flank that does not radiate.  He has history of diverticulosis.  He denies any dysuria or hematuria.  He has also be having a sore throat and he underwent a course of antibiotics that didn't help his symptoms     PMH: Hypertension, ischemic cardiomyopathy, GERD, arthritis, anxiety, basal cell carcinoma of skin, diverticulosis     PSH: Left inguinal hernia, back surgery, defibrillator pacemaker 8 years ago.  At surgery    MEDS: Reviewed in Epic, does not take any blood thinners     ALL: No known drug allergies    FH and SH: There is family history of prostate cancer in his father and brother.  The patient is retired and .  He does not smoke or drink any alcohol     ROS:   Constitutional: denies any weight changes, fatigue or weakness.  Reports chills  HENT: Reports hearing loss, postnasal drip, sneezing, sore throat and hoarseness  Cardiovascular: Denies chest pain reports  "lower extremity edema and palpitations.  Respiratory: denies cough, sputum, reports SOB.  Gastrointestinal: As per history of present illness  Genitourinary: denies dysuria, reports urinary hesitancy and urinary frequency  Endocrine: Reports heat and cold intolerance as well as excessive urination  Hem: denies excessive bruising and postop bleeding.  Musculoskeletal: denies weakness, joint swelling, pain or stiffness.  Neuro: Ports dizziness and lightheadedness  Skin: denies change in nevi, rashes, masses.     PE:   Vitals:    11/13/18 1403   Pulse: 85   SpO2: 95%   Weight: 88.9 kg (196 lb)   Height: 180.3 cm (71\")     Alert and oriented ×3, no acute distress.  Head is normocephalic and atraumatic.  Neck is supple there is no thyromegaly or lymphadenopathy  Chest is clear bilaterally there is no added sounds  Regular rate and rhythm, no murmurs  Abdomen is soft and nontender, is nondistended, bowel sounds are positive.  There is evidence of right inguinal hernia that is small in size and is easy reducible and nontender  There is no left flank pain  No clubbing cyanosis or edema in lower or upper extremities    Diagnostic studies:   Labs (10/09/18):   Sodium 137 - 145 mmol/L 143    Potassium 3.5 - 5.4 mmol/L 3.9    Chloride 98 - 107 mmol/L 103    Carbon Dioxide 22 - 30 mmol/L 23    Glucose 74 - 99 mg/dL 118 Abnormally high     Blood Urea Nitrogen (BUN) 7 - 20 mg/dL 12    Creatinine-Blood 0.7 - 1.5 mg/dL 0.8    BUN/Creatinine Ratio 10.0 - 20.0 RATIO 14.0    Estimated GFR >60 /1.73 m2 93    Comment: Multiply by 1.212 if . Results provided are valid only for patients who are 18 to 70 years of age. Results do not take into account body mass.   Total Protein 6.3 - 8.2 g/dL 7.5    Albumin 3.5 - 5.0 g/dL 4.5    Globulin 1.5 - 4.5 g/dL 3.0    Albumin/Globulin Ratio 1.1 - 2.5 RATIO 1.5    Calcium 8.4 - 10.2 mg/dL 9.9    Total Bilirubin 0.2 - 1.3 mg/dL 0.8    AST/SGOT 15 - 46 U/L 28    ALT/SGPT 13 - 69 U/L 23  "   Alkaline Phosphatase 38 - 126 U/L 53      Assessment and plan    The patient is a very pleasant 78-year-old male with multiple complaints including nausea, loose stools and flank pain.  Regarding his nausea, this has not been associated with food intake and can be related to multiple reasons including peptic ulcer disease, medications, gallbladder disease.  He does not have typical symptoms of gallbladder disease.  He reports having dark stools that are concerning for peptic ulcer disease.  On physical examination his pain is located over the flank and no in the abdomen.  He has some urinary symptoms and urinary tract infection with need to be rule out.  Regarding his loose stools, this can be caused by multiple reasons including gallbladder disease, diverticulosis, functional, infections.  He has multiple symptoms,but none of them specific for any type of disease.  I think we should start a broad workup to try to find the reason for his symptoms.  He has ischemic cardiomyopathy with ejection fraction of 30% so any type of invasive procedure should be justified by objective findings.    - I will order CBC to check for anemia  - H. pylori urea breath test  - Occult blood x 3   - CT scan abdomen and pelvis with IV contrast  - I will start the patient on Zofran when necessary for nausea, continue PPI  -  After results are back I will discuss the patient about further steps      Nitish Wells MD  General, Minimally Invasive and Endoscopic Surgery  [facility]     [address], [address]  [place], [place], [address]  P: [phone]  F: [phone]

## 2018-11-16 ENCOUNTER — LAB (OUTPATIENT)
Dept: LAB | Facility: HOSPITAL | Age: 78
End: 2018-11-16

## 2018-11-16 DIAGNOSIS — R10.9 ABDOMINAL PAIN, UNSPECIFIED ABDOMINAL LOCATION: ICD-10-CM

## 2018-11-16 LAB
BACTERIA UR QL AUTO: NORMAL /HPF
BASOPHILS # BLD AUTO: 0.03 10*3/MM3 (ref 0–0.2)
BASOPHILS NFR BLD AUTO: 0.4 % (ref 0–1.5)
BILIRUB UR QL STRIP: NEGATIVE
CLARITY UR: CLEAR
COLLECT DATE SP2 STL: NORMAL
COLLECT DATE SP3 STL: NORMAL
COLLECT DATE STL: NORMAL
COLOR UR: YELLOW
DEPRECATED RDW RBC AUTO: 49.3 FL (ref 37–54)
EOSINOPHIL # BLD AUTO: 0.11 10*3/MM3 (ref 0–0.7)
EOSINOPHIL NFR BLD AUTO: 1.6 % (ref 0.3–6.2)
ERYTHROCYTE [DISTWIDTH] IN BLOOD BY AUTOMATED COUNT: 13.9 % (ref 11.5–14.5)
GLUCOSE UR STRIP-MCNC: NEGATIVE MG/DL
HCT VFR BLD AUTO: 47.1 % (ref 40.4–52.2)
HEMOCCULT STL QL: NEGATIVE
HGB BLD-MCNC: 16.1 G/DL (ref 13.7–17.6)
HGB UR QL STRIP.AUTO: NEGATIVE
HYALINE CASTS UR QL AUTO: NORMAL /LPF
IMM GRANULOCYTES # BLD: 0.02 10*3/MM3 (ref 0–0.03)
IMM GRANULOCYTES NFR BLD: 0.3 % (ref 0–0.5)
KETONES UR QL STRIP: NEGATIVE
LEUKOCYTE ESTERASE UR QL STRIP.AUTO: NEGATIVE
LYMPHOCYTES # BLD AUTO: 2.61 10*3/MM3 (ref 0.9–4.8)
LYMPHOCYTES NFR BLD AUTO: 38.6 % (ref 19.6–45.3)
Lab: 1100
Lab: 1300
Lab: 700
MCH RBC QN AUTO: 33.8 PG (ref 27–32.7)
MCHC RBC AUTO-ENTMCNC: 34.2 G/DL (ref 32.6–36.4)
MCV RBC AUTO: 98.9 FL (ref 79.8–96.2)
MONOCYTES # BLD AUTO: 0.62 10*3/MM3 (ref 0.2–1.2)
MONOCYTES NFR BLD AUTO: 9.2 % (ref 5–12)
NEUTROPHILS # BLD AUTO: 3.39 10*3/MM3 (ref 1.9–8.1)
NEUTROPHILS NFR BLD AUTO: 50.2 % (ref 42.7–76)
NITRITE UR QL STRIP: NEGATIVE
PH UR STRIP.AUTO: 6.5 [PH] (ref 5–8)
PLATELET # BLD AUTO: 200 10*3/MM3 (ref 140–500)
PMV BLD AUTO: 10.1 FL (ref 6–12)
PROT UR QL STRIP: NEGATIVE
RBC # BLD AUTO: 4.76 10*6/MM3 (ref 4.6–6)
RBC # UR: NORMAL /HPF
REF LAB TEST METHOD: NORMAL
SP GR UR STRIP: 1.01 (ref 1–1.03)
SQUAMOUS #/AREA URNS HPF: NORMAL /HPF
UROBILINOGEN UR QL STRIP: NORMAL
WBC NRBC COR # BLD: 6.76 10*3/MM3 (ref 4.5–10.7)
WBC UR QL AUTO: NORMAL /HPF

## 2018-11-16 PROCEDURE — 83013 H PYLORI (C-13) BREATH: CPT

## 2018-11-16 PROCEDURE — 85025 COMPLETE CBC W/AUTO DIFF WBC: CPT

## 2018-11-16 PROCEDURE — 36415 COLL VENOUS BLD VENIPUNCTURE: CPT

## 2018-11-16 PROCEDURE — 82272 OCCULT BLD FECES 1-3 TESTS: CPT

## 2018-11-16 PROCEDURE — 81001 URINALYSIS AUTO W/SCOPE: CPT

## 2018-11-19 ENCOUNTER — HOSPITAL ENCOUNTER (OUTPATIENT)
Dept: CT IMAGING | Facility: HOSPITAL | Age: 78
Discharge: HOME | End: 2018-11-19
Attending: SURGERY | Admitting: SURGERY

## 2018-11-19 DIAGNOSIS — R10.9 ABDOMINAL PAIN, UNSPECIFIED ABDOMINAL LOCATION: ICD-10-CM

## 2018-11-19 PROCEDURE — 74176 CT ABD & PELVIS W/O CONTRAST: CPT

## 2018-11-20 ENCOUNTER — CLINICAL SUPPORT NO REQUIREMENTS (OUTPATIENT)
Dept: CARDIOLOGY | Facility: CLINIC | Age: 78
End: 2018-11-20

## 2018-11-20 DIAGNOSIS — I42.8 NON-ISCHEMIC CARDIOMYOPATHY (HCC): Primary | ICD-10-CM

## 2018-11-20 LAB — UREA BREATH TEST QL: NEGATIVE

## 2018-11-20 PROCEDURE — 93295 DEV INTERROG REMOTE 1/2/MLT: CPT | Performed by: INTERNAL MEDICINE

## 2018-11-20 PROCEDURE — 93296 REM INTERROG EVL PM/IDS: CPT | Performed by: INTERNAL MEDICINE

## 2018-11-26 ENCOUNTER — TELEPHONE (OUTPATIENT)
Dept: SURGERY | Facility: CLINIC | Age: 78
End: 2018-11-26

## 2018-11-26 DIAGNOSIS — N62 GYNECOMASTIA: Primary | ICD-10-CM

## 2018-11-26 DIAGNOSIS — J98.6 DISEASE OF DIAPHRAGM: ICD-10-CM

## 2018-11-26 NOTE — TELEPHONE ENCOUNTER
Called patient regarding workup results.   CT scan showed gynecomastia, a fat containing right inguinal hernia with small area of appendix and left hemidiaphragm elevation, sigmoid diverticulosis.  All labs are unremarkable including CBC, H. Pylori and occult blood stool x 3.   His nausea is better and reports improvement of his flank pain. No vomiting. Continues to have throat discomfort.   I think his gynecomastia it's likely related to spironolactone use but he will need to have bilateral breast US.   His hernia is asymptomatic and do not need repair for now  Regarding his left diaphragm elevation, I will refer him to pulmonologist  I will call him with the results  He understood

## 2018-11-27 ENCOUNTER — TRANSCRIBE ORDERS (OUTPATIENT)
Dept: SURGERY | Facility: CLINIC | Age: 78
End: 2018-11-27

## 2018-11-27 DIAGNOSIS — N62 GYNECOMASTIA: Primary | ICD-10-CM

## 2018-11-27 NOTE — TELEPHONE ENCOUNTER
Called the patient and advised of the pulmonary office's referral process. I also advised that I will call the patient with his appointment information with radiology. He verbalized understanding.

## 2018-12-11 ENCOUNTER — HOSPITAL ENCOUNTER (OUTPATIENT)
Dept: ULTRASOUND IMAGING | Facility: HOSPITAL | Age: 78
End: 2018-12-11
Attending: SURGERY

## 2018-12-11 ENCOUNTER — HOSPITAL ENCOUNTER (OUTPATIENT)
Dept: MAMMOGRAPHY | Facility: HOSPITAL | Age: 78
Discharge: HOME | End: 2018-12-11
Attending: SURGERY | Admitting: SURGERY

## 2018-12-11 DIAGNOSIS — N62 GYNECOMASTIA: ICD-10-CM

## 2018-12-11 PROCEDURE — 77066 DX MAMMO INCL CAD BI: CPT

## 2018-12-20 RX ORDER — SACUBITRIL AND VALSARTAN 24; 26 MG/1; MG/1
TABLET, FILM COATED ORAL
Qty: 180 TABLET | Refills: 1 | Status: SHIPPED | OUTPATIENT
Start: 2018-12-20 | End: 2019-06-28 | Stop reason: SDUPTHER

## 2019-01-14 ENCOUNTER — HOSPITAL ENCOUNTER (OUTPATIENT)
Dept: GENERAL RADIOLOGY | Facility: HOSPITAL | Age: 79
Discharge: HOME | End: 2019-01-14
Attending: INTERNAL MEDICINE | Admitting: INTERNAL MEDICINE

## 2019-01-14 DIAGNOSIS — M54.2 NECK PAIN: ICD-10-CM

## 2019-01-14 PROCEDURE — 72040 X-RAY EXAM NECK SPINE 2-3 VW: CPT

## 2019-01-17 ENCOUNTER — OFFICE VISIT (OUTPATIENT)
Dept: CARDIOLOGY | Facility: CLINIC | Age: 79
End: 2019-01-17

## 2019-01-17 ENCOUNTER — CLINICAL SUPPORT NO REQUIREMENTS (OUTPATIENT)
Dept: CARDIOLOGY | Facility: CLINIC | Age: 79
End: 2019-01-17

## 2019-01-17 VITALS
BODY MASS INDEX: 28.42 KG/M2 | DIASTOLIC BLOOD PRESSURE: 78 MMHG | OXYGEN SATURATION: 97 % | SYSTOLIC BLOOD PRESSURE: 126 MMHG | HEIGHT: 71 IN | HEART RATE: 84 BPM | WEIGHT: 203 LBS

## 2019-01-17 DIAGNOSIS — I42.0 CARDIOMYOPATHY, DILATED, NONISCHEMIC (HCC): Primary | ICD-10-CM

## 2019-01-17 DIAGNOSIS — I47.20 VENTRICULAR TACHYCARDIA (HCC): ICD-10-CM

## 2019-01-17 DIAGNOSIS — I42.8 NON-ISCHEMIC CARDIOMYOPATHY (HCC): Primary | ICD-10-CM

## 2019-01-17 PROCEDURE — 93290 INTERROG DEV EVAL ICPMS IP: CPT | Performed by: INTERNAL MEDICINE

## 2019-01-17 PROCEDURE — 99214 OFFICE O/P EST MOD 30 MIN: CPT | Performed by: INTERNAL MEDICINE

## 2019-01-17 PROCEDURE — 93283 PRGRMG EVAL IMPLANTABLE DFB: CPT | Performed by: INTERNAL MEDICINE

## 2019-01-17 RX ORDER — DEXLANSOPRAZOLE 60 MG/1
1 CAPSULE, DELAYED RELEASE ORAL DAILY
Refills: 5 | COMMUNITY
Start: 2019-01-09 | End: 2019-07-17

## 2019-01-17 NOTE — PROGRESS NOTES
Date of Office Visit: 2019  Encounter Provider: Guido Michael MD  Place of Service: [facility] CARDIOLOGY  Patient Name: Harvey Leyva  :1940      Chief Complaint   Patient presents with   • Cardiomyopathy     6 mos follow up     History of Present Illness    The patient is an 78-year-old white male with a history of a dilated nonischemic cardiomyopathy as well as ventricular tachycardia.  He has a an AICD implanted because of the V. tach.  His ejection fraction is noted to be 30%.    The patient had been feeling poorly.  He was placed on Entresto and now feels substantially better.  His exertional capacity has improved to the point where he is now really class 1-2.  He does not complain of orthopnea nor paroxysmal nocturnal dyspnea.  He still has some episodes of fatigue as well as palpitations.    We investigated his ICD did today.  He had one 8 beat run of nonsustained ventricular tachycardia but otherwise no issues.    Past Medical History:   Diagnosis Date   • Basal cell carcinoma    • Cardiomyopathy (CMS/HCC)     nonischemic   • Difficulty breathing    • Dilated cardiomyopathy (CMS/HCC)    • Diverticulosis    • Dizzy    • Dyspnea    • Fatigue    • Hyperlipidemia    • Hypotension     due to drugs   • Lightheaded    • Malaise and fatigue    • Rash, skin    • Ventricular tachycardia (CMS/HCC)          Past Surgical History:   Procedure Laterality Date   • ABDOMINAL HERNIA REPAIR     • BACK SURGERY N/A    • CARDIAC DEFIBRILLATOR PLACEMENT N/A 2009    Prophylactic AICD (Medtronic Secura DR model # C688UJZ, serial # [id number])-Dr. Barrett Michael   • CARDIAC ELECTROPHYSIOLOGY PROCEDURE Left 2017    Procedure: Device Replacement   ICD GEN CHG MEDTRONIC;  Surgeon: Guido Michael MD;  Location: [facility] INVASIVE LOCATION;  Service:    • CATARACT EXTRACTION     • COLONOSCOPY N/A 2010    Roel Clancy    • FINE NEEDLE ASPIRATION N/A  11/29/2009    Pericardial fluid aspiration-Dr. Willian Quintanilla   • INGUINAL HERNIA REPAIR Left    • PROSTATE SURGERY     • VEIN LIGATION AND STRIPPING             Current Outpatient Medications:   •  ALPRAZolam (XANAX) 0.5 MG tablet, Take 0.5 mg by mouth 3 (Three) Times a Day., Disp: , Rfl:   •  amoxicillin (AMOXIL) 500 MG capsule, prophylaxis during oral procedures, Disp: , Rfl: 1  •  Carboxymethylcellulose Sodium (RETAINE CMC OP), Apply 1 drop to eye(s) as directed by provider 4 (Four) Times a Day., Disp: , Rfl:   •  DEXILANT 60 MG capsule, Take 1 capsule by mouth Daily., Disp: , Rfl: 5  •  ENTRESTO 24-26 MG tablet, TAKE 1 TABLET BY MOUTH 2 (TWO) TIMES A DAY., Disp: 180 tablet, Rfl: 1  •  finasteride (PROSCAR) 5 MG tablet, Take 5 mg by mouth Daily., Disp: , Rfl:   •  furosemide (LASIX) 20 MG tablet, TAKE 1 TABLET BY MOUTH 2 (TWO) TIMES A DAY., Disp: 180 tablet, Rfl: 1  •  metoprolol tartrate (LOPRESSOR) 50 MG tablet, Take 50 mg by mouth 3 (Three) Times a Day., Disp: , Rfl:   •  ondansetron (ZOFRAN) 4 MG tablet, Take 1 tablet by mouth Daily As Needed for Nausea or Vomiting., Disp: 30 tablet, Rfl: 1  •  spironolactone (ALDACTONE) 25 MG tablet, TAKE 0.5 TABLETS BY MOUTH DAILY., Disp: 45 tablet, Rfl: 1  •  tamsulosin (FLOMAX) 0.4 MG capsule 24 hr capsule, Take 0.4 mg by mouth 2 (Two) Times a Day., Disp: , Rfl:       Social History     Socioeconomic History   • Marital status:      Spouse name: Not on file   • Number of children: Not on file   • Years of education: Not on file   • Highest education level: Not on file   Social Needs   • Financial resource strain: Not on file   • Food insecurity - worry: Not on file   • Food insecurity - inability: Not on file   • Transportation needs - medical: Not on file   • Transportation needs - non-medical: Not on file   Occupational History   • Not on file   Tobacco Use   • Smoking status: Former Smoker   • Smokeless tobacco: Never Used   Substance and Sexual Activity   •  "Alcohol use: No     Comment: caffeine use   • Drug use: No   • Sexual activity: Defer   Other Topics Concern   • Not on file   Social History Narrative   • Not on file         Review of Systems   Constitution: Positive for malaise/fatigue.   HENT: Negative.    Eyes: Negative.    Cardiovascular: Positive for palpitations.   Respiratory: Negative.    Endocrine: Negative.    Skin: Negative.    Musculoskeletal: Negative.    Gastrointestinal: Negative.    Neurological: Negative.    Psychiatric/Behavioral: Negative.        Procedures    Procedures        Objective:    /78 (BP Location: Left arm, Patient Position: Sitting, Cuff Size: Large Adult)   Pulse 84   Ht 180.3 cm (71\")   Wt 92.1 kg (203 lb)   SpO2 97%   BMI 28.31 kg/m²         Physical Exam   Constitutional: He is oriented to person, place, and time. He appears well-developed and well-nourished.   HENT:   Head: Normocephalic.   Eyes: Pupils are equal, round, and reactive to light.   Neck: Normal range of motion. No JVD present. Carotid bruit is not present. No thyromegaly present.   Cardiovascular: Normal rate, regular rhythm, S1 normal, S2 normal, normal heart sounds and intact distal pulses. Exam reveals no gallop and no friction rub.   No murmur heard.  Pulmonary/Chest: Effort normal and breath sounds normal.   Abdominal: Soft. Bowel sounds are normal.   Musculoskeletal: He exhibits no edema.   Neurological: He is alert and oriented to person, place, and time.   Skin: Skin is warm, dry and intact. No erythema.   Psychiatric: He has a normal mood and affect.   Vitals reviewed.          Assessment:       Diagnosis Plan   1. Cardiomyopathy, dilated, nonischemic (CMS/HCC)     2. Ventricular tachycardia (CMS/HCC)         1.  Dilated nonischemic cardiomyopathy: Class 1-2 symptoms.  Significant improvement on Entresto.  Continue the same    2.  Ventricular tachycardia: Status post AICD.  1 nonsustained run recorded.     Plan:       We will continue the " present course.  He will follow-up in 6 months.

## 2019-03-18 RX ORDER — METOPROLOL TARTRATE 50 MG/1
50 TABLET, FILM COATED ORAL 3 TIMES DAILY
Qty: 270 TABLET | Refills: 1 | Status: SHIPPED | OUTPATIENT
Start: 2019-03-18 | End: 2019-09-13 | Stop reason: SDUPTHER

## 2019-03-20 RX ORDER — METOPROLOL TARTRATE 100 MG/1
TABLET ORAL
Qty: 180 TABLET | Refills: 1 | OUTPATIENT
Start: 2019-03-20

## 2019-04-01 RX ORDER — SPIRONOLACTONE 25 MG/1
12.5 TABLET ORAL DAILY
Qty: 45 TABLET | Refills: 1 | Status: SHIPPED | OUTPATIENT
Start: 2019-04-01 | End: 2019-09-24 | Stop reason: SDUPTHER

## 2019-04-02 RX ORDER — FUROSEMIDE 20 MG/1
TABLET ORAL
Qty: 180 TABLET | Refills: 1 | Status: SHIPPED | OUTPATIENT
Start: 2019-04-02 | End: 2019-09-25 | Stop reason: SDUPTHER

## 2019-04-25 ENCOUNTER — CLINICAL SUPPORT NO REQUIREMENTS (OUTPATIENT)
Dept: CARDIOLOGY | Facility: CLINIC | Age: 79
End: 2019-04-25

## 2019-04-25 DIAGNOSIS — I42.8 NON-ISCHEMIC CARDIOMYOPATHY (HCC): Primary | ICD-10-CM

## 2019-04-25 DIAGNOSIS — I47.20 VENTRICULAR TACHYCARDIA (HCC): ICD-10-CM

## 2019-04-25 PROCEDURE — 93296 REM INTERROG EVL PM/IDS: CPT | Performed by: INTERNAL MEDICINE

## 2019-04-25 PROCEDURE — 93297 REM INTERROG DEV EVAL ICPMS: CPT | Performed by: INTERNAL MEDICINE

## 2019-04-25 PROCEDURE — 93295 DEV INTERROG REMOTE 1/2/MLT: CPT | Performed by: INTERNAL MEDICINE

## 2019-06-22 ENCOUNTER — CLINICAL SUPPORT NO REQUIREMENTS (OUTPATIENT)
Dept: CARDIOLOGY | Facility: CLINIC | Age: 79
End: 2019-06-22

## 2019-06-22 DIAGNOSIS — I42.9 CARDIOMYOPATHY, UNSPECIFIED TYPE (HCC): Primary | ICD-10-CM

## 2019-06-24 ENCOUNTER — TELEPHONE (OUTPATIENT)
Dept: CARDIOLOGY | Facility: CLINIC | Age: 79
End: 2019-06-24

## 2019-06-28 RX ORDER — SACUBITRIL AND VALSARTAN 24; 26 MG/1; MG/1
TABLET, FILM COATED ORAL
Qty: 180 TABLET | Refills: 1 | Status: SHIPPED | OUTPATIENT
Start: 2019-06-28 | End: 2020-04-30

## 2019-07-17 ENCOUNTER — OFFICE VISIT (OUTPATIENT)
Dept: CARDIOLOGY | Facility: CLINIC | Age: 79
End: 2019-07-17

## 2019-07-17 ENCOUNTER — CLINICAL SUPPORT NO REQUIREMENTS (OUTPATIENT)
Dept: CARDIOLOGY | Facility: CLINIC | Age: 79
End: 2019-07-17

## 2019-07-17 VITALS
WEIGHT: 198.4 LBS | HEART RATE: 84 BPM | SYSTOLIC BLOOD PRESSURE: 100 MMHG | BODY MASS INDEX: 27.77 KG/M2 | OXYGEN SATURATION: 98 % | DIASTOLIC BLOOD PRESSURE: 70 MMHG | HEIGHT: 71 IN

## 2019-07-17 DIAGNOSIS — I49.5 SICK SINUS SYNDROME (HCC): ICD-10-CM

## 2019-07-17 DIAGNOSIS — I47.20 VENTRICULAR TACHYCARDIA (HCC): ICD-10-CM

## 2019-07-17 DIAGNOSIS — I42.0 CARDIOMYOPATHY, DILATED, NONISCHEMIC (HCC): Primary | ICD-10-CM

## 2019-07-17 DIAGNOSIS — I42.8 NONISCHEMIC CARDIOMYOPATHY (HCC): Primary | ICD-10-CM

## 2019-07-17 PROCEDURE — 93283 PRGRMG EVAL IMPLANTABLE DFB: CPT | Performed by: INTERNAL MEDICINE

## 2019-07-17 PROCEDURE — 93290 INTERROG DEV EVAL ICPMS IP: CPT | Performed by: INTERNAL MEDICINE

## 2019-07-17 PROCEDURE — 99214 OFFICE O/P EST MOD 30 MIN: CPT | Performed by: INTERNAL MEDICINE

## 2019-07-17 NOTE — PROGRESS NOTES
Date of Office Visit: 2019  Encounter Provider: Guido Michael MD  Place of Service: [facility] CARDIOLOGY  Patient Name: Harvey Leyva  :1940      Chief Complaint   Patient presents with   • Cardiomyopathy     6 mos follow up     History of Present Illness    The patient is an 79-year-old white male with a history of a dilated nonischemic cardiomyopathy who presents to the office today for follow-up.  He also has a history of ventricular tachycardia and recently received therapy through his device.  The patient states he was in the kitchen when suddenly he lost consciousness for just a few seconds.  It appears that he developed ventricular tachycardia that was not converted by pacing.  He was consequently shocked and converted back to sinus rhythm.  He has not had any sequela since.  His device was checked again today.  There have been no events since his event a month ago.    Symptomatically he does have intermittent fatigue and occasional lightheadedness if his blood pressure drops too low.  He avoids the heat and is trying to stay well-hydrated.    Past Medical History:   Diagnosis Date   • Basal cell carcinoma    • Cardiomyopathy (CMS/HCC)     nonischemic   • Difficulty breathing    • Dilated cardiomyopathy (CMS/HCC)    • Diverticulosis    • Dizzy    • Dyspnea    • Fatigue    • Hyperlipidemia    • Hypotension     due to drugs   • Lightheaded    • Malaise and fatigue    • Rash, skin    • Ventricular tachycardia (CMS/HCC)          Past Surgical History:   Procedure Laterality Date   • ABDOMINAL HERNIA REPAIR     • BACK SURGERY N/A    • CARDIAC DEFIBRILLATOR PLACEMENT N/A 2009    Prophylactic AICD (Medtronic Secura DR model # C522DWF, serial # [id number])-Dr. Barrett Michael   • CARDIAC ELECTROPHYSIOLOGY PROCEDURE Left 2017    Procedure: Device Replacement   ICD GEN CHG MEDTRONIC;  Surgeon: Guido Michael MD;  Location: [facility] INVASIVE LOCATION;   Service:    • CATARACT EXTRACTION     • COLONOSCOPY N/A 12/06/2010    Roel Clancy    • FINE NEEDLE ASPIRATION N/A 11/29/2009    Pericardial fluid aspiration-Dr. Willian Quintanilla   • INGUINAL HERNIA REPAIR Left    • PROSTATE SURGERY     • VEIN LIGATION AND STRIPPING             Current Outpatient Medications:   •  ALPRAZolam (XANAX) 0.5 MG tablet, Take 0.5 mg by mouth 3 (Three) Times a Day., Disp: , Rfl:   •  amoxicillin (AMOXIL) 500 MG capsule, prophylaxis during oral procedures, Disp: , Rfl: 1  •  Carboxymethylcellulose Sodium (RETAINE CMC OP), Apply 1 drop to eye(s) as directed by provider 4 (Four) Times a Day., Disp: , Rfl:   •  ENTRESTO 24-26 MG tablet, TAKE 1 TABLET BY MOUTH TWICE A DAY, Disp: 180 tablet, Rfl: 1  •  esomeprazole (nexIUM) 20 MG capsule, Take 20 mg by mouth Every Morning Before Breakfast., Disp: , Rfl:   •  finasteride (PROSCAR) 5 MG tablet, Take 5 mg by mouth Daily., Disp: , Rfl:   •  furosemide (LASIX) 20 MG tablet, TAKE 1 TABLET BY MOUTH TWICE A DAY, Disp: 180 tablet, Rfl: 1  •  metoprolol tartrate (LOPRESSOR) 50 MG tablet, Take 1 tablet by mouth 3 (Three) Times a Day., Disp: 270 tablet, Rfl: 1  •  spironolactone (ALDACTONE) 25 MG tablet, TAKE 0.5 TABLETS BY MOUTH DAILY., Disp: 45 tablet, Rfl: 1  •  tamsulosin (FLOMAX) 0.4 MG capsule 24 hr capsule, Take 0.4 mg by mouth 2 (Two) Times a Day., Disp: , Rfl:       Social History     Socioeconomic History   • Marital status:      Spouse name: Not on file   • Number of children: Not on file   • Years of education: Not on file   • Highest education level: Not on file   Tobacco Use   • Smoking status: Former Smoker   • Smokeless tobacco: Never Used   Substance and Sexual Activity   • Alcohol use: No     Comment: caffeine use   • Drug use: No   • Sexual activity: Defer         Review of Systems   Constitution: Positive for malaise/fatigue.   HENT: Negative.    Eyes: Negative.    Cardiovascular: Negative.    Respiratory: Negative.   "  Endocrine: Negative.    Skin: Negative.    Musculoskeletal: Negative.    Gastrointestinal: Negative.    Neurological: Positive for light-headedness.   Psychiatric/Behavioral: Negative.        Procedures    Procedures        Objective:    /70 (BP Location: Left arm, Patient Position: Sitting, Cuff Size: Adult)   Pulse 84   Ht 180.3 cm (71\")   Wt 90 kg (198 lb 6.4 oz)   SpO2 98%   BMI 27.67 kg/m²         Physical Exam   Constitutional: He is oriented to person, place, and time. He appears well-developed and well-nourished.   HENT:   Head: Normocephalic.   Eyes: Pupils are equal, round, and reactive to light.   Neck: Normal range of motion. No JVD present. Carotid bruit is not present. No thyromegaly present.   Cardiovascular: Normal rate, regular rhythm, S1 normal, S2 normal, normal heart sounds and intact distal pulses. Exam reveals no gallop and no friction rub.   No murmur heard.  Pulmonary/Chest: Effort normal and breath sounds normal.   Abdominal: Soft. Bowel sounds are normal.   Musculoskeletal: He exhibits no edema.   Neurological: He is alert and oriented to person, place, and time.   Skin: Skin is warm, dry and intact. No erythema.   Psychiatric: He has a normal mood and affect.   Vitals reviewed.          Assessment:       Diagnosis Plan   1. Cardiomyopathy, dilated, nonischemic (CMS/HCC)     2. Ventricular tachycardia (CMS/HCC)       1.  Cardiac myopathy: Dilated.  Hemodynamically stable.  No evidence of heart failure.  Ejection fraction 30%  2.  Ventricular tachycardia: Status post AICD.  Recent therapy delivered.  I discussed the event with the patient and his wife.  There is been no sequela or further events.  We will continue his present medication.  If he has further events we will either consider antiarrhythmic therapy or perhaps even a V. tach ablation.       Plan:   1.  Continued surveillance  2.  Follow-up in 3 to 4 months    "

## 2019-09-13 RX ORDER — METOPROLOL TARTRATE 50 MG/1
TABLET, FILM COATED ORAL
Qty: 270 TABLET | Refills: 1 | Status: SHIPPED | OUTPATIENT
Start: 2019-09-13 | End: 2020-03-09

## 2019-09-24 RX ORDER — SPIRONOLACTONE 25 MG/1
TABLET ORAL
Qty: 45 TABLET | Refills: 1 | Status: SHIPPED | OUTPATIENT
Start: 2019-09-24 | End: 2020-04-02

## 2019-09-25 RX ORDER — FUROSEMIDE 20 MG/1
TABLET ORAL
Qty: 180 TABLET | Refills: 3 | Status: SHIPPED | OUTPATIENT
Start: 2019-09-25 | End: 2020-09-28

## 2019-10-23 ENCOUNTER — CLINICAL SUPPORT NO REQUIREMENTS (OUTPATIENT)
Dept: CARDIOLOGY | Facility: CLINIC | Age: 79
End: 2019-10-23

## 2019-10-23 DIAGNOSIS — I42.0 CARDIOMYOPATHY, DILATED, NONISCHEMIC (HCC): Primary | ICD-10-CM

## 2019-10-23 PROCEDURE — 93296 REM INTERROG EVL PM/IDS: CPT | Performed by: INTERNAL MEDICINE

## 2019-10-23 PROCEDURE — 93295 DEV INTERROG REMOTE 1/2/MLT: CPT | Performed by: INTERNAL MEDICINE

## 2019-11-20 ENCOUNTER — OFFICE VISIT (OUTPATIENT)
Dept: CARDIOLOGY | Facility: CLINIC | Age: 79
End: 2019-11-20

## 2019-11-20 VITALS
DIASTOLIC BLOOD PRESSURE: 84 MMHG | OXYGEN SATURATION: 98 % | WEIGHT: 200.6 LBS | SYSTOLIC BLOOD PRESSURE: 126 MMHG | BODY MASS INDEX: 28.08 KG/M2 | HEART RATE: 94 BPM | HEIGHT: 71 IN

## 2019-11-20 DIAGNOSIS — I47.20 VENTRICULAR TACHYCARDIA (HCC): ICD-10-CM

## 2019-11-20 DIAGNOSIS — I42.0 CARDIOMYOPATHY, DILATED, NONISCHEMIC (HCC): Primary | ICD-10-CM

## 2019-11-20 PROCEDURE — 99214 OFFICE O/P EST MOD 30 MIN: CPT | Performed by: INTERNAL MEDICINE

## 2019-11-20 PROCEDURE — 93000 ELECTROCARDIOGRAM COMPLETE: CPT | Performed by: INTERNAL MEDICINE

## 2019-11-20 NOTE — PROGRESS NOTES
Date of Office Visit: 2019    Patient Name: Harvey Leyva  : 1940    Encounter Provider: Guido Michael MD  Referring Provider: Guido Michael MD  Place of Service: [facility] CARDIOLOGY  Patient Care Team:  Wero Chatman MD as PCP - General (Family Medicine)  Wero Chatman MD as PCP - Claims Attributed      Chief Complaint   Patient presents with   • Cardiomyopathy     4 mos follow up     History of Present Illness    She is an 79-year-old white male with a history of dilated nonischemic cardiomyopathy as well as ventricular tachycardia who returns to the office today for follow-up.  He has an ICD in place and since his last visit there have not been any changes in his rhythm.  He has not received any therapy.  His ejection fraction approximately 30%.  He feels essentially unchanged while on Entresto.  He still has exertional dyspnea and easy fatigability but no significant peripheral edema.    Past Medical History:   Diagnosis Date   • Basal cell carcinoma    • Cardiomyopathy (CMS/HCC)     nonischemic   • Difficulty breathing    • Dilated cardiomyopathy (CMS/HCC)    • Diverticulosis    • Dizzy    • Dyspnea    • Fatigue    • Hyperlipidemia    • Hypotension     due to drugs   • Lightheaded    • Malaise and fatigue    • Rash, skin    • Ventricular tachycardia (CMS/HCC)          Past Surgical History:   Procedure Laterality Date   • ABDOMINAL HERNIA REPAIR     • BACK SURGERY N/A    • CARDIAC DEFIBRILLATOR PLACEMENT N/A 2009    Prophylactic AICD (Medtronic Secura DR model # K624GXE, serial # [id number])-Dr. Barrett Michael   • CARDIAC ELECTROPHYSIOLOGY PROCEDURE Left 2017    Procedure: Device Replacement   ICD GEN CHG MEDTRONIC;  Surgeon: Guido Michael MD;  Location: [facility] INVASIVE LOCATION;  Service:    • CATARACT EXTRACTION     • COLONOSCOPY N/A 2010    Roel Clancy    • FINE NEEDLE ASPIRATION N/A 2009     Pericardial fluid aspiration-Dr. Willian Quintanilla   • INGUINAL HERNIA REPAIR Left    • PROSTATE SURGERY     • VEIN LIGATION AND STRIPPING             Current Outpatient Medications:   •  ALPRAZolam (XANAX) 0.5 MG tablet, Take 0.5 mg by mouth 3 (Three) Times a Day., Disp: , Rfl:   •  ENTRESTO 24-26 MG tablet, TAKE 1 TABLET BY MOUTH TWICE A DAY, Disp: 180 tablet, Rfl: 1  •  esomeprazole (nexIUM) 20 MG capsule, Take 20 mg by mouth Every Morning Before Breakfast., Disp: , Rfl:   •  finasteride (PROSCAR) 5 MG tablet, Take 5 mg by mouth Daily., Disp: , Rfl:   •  furosemide (LASIX) 20 MG tablet, TAKE 1 TABLET BY MOUTH TWICE A DAY, Disp: 180 tablet, Rfl: 3  •  magnesium oxide (MAGOX) 400 (241.3 Mg) MG tablet tablet, Take 400 mg by mouth Daily., Disp: , Rfl:   •  metoprolol tartrate (LOPRESSOR) 50 MG tablet, TAKE 1 TABLET BY MOUTH THREE TIMES A DAY, Disp: 270 tablet, Rfl: 1  •  spironolactone (ALDACTONE) 25 MG tablet, TAKE 1/2 TABLET BY MOUTH DAILY., Disp: 45 tablet, Rfl: 1  •  tamsulosin (FLOMAX) 0.4 MG capsule 24 hr capsule, Take 0.4 mg by mouth 2 (Two) Times a Day., Disp: , Rfl:       Social History     Socioeconomic History   • Marital status:      Spouse name: Not on file   • Number of children: Not on file   • Years of education: Not on file   • Highest education level: Not on file   Tobacco Use   • Smoking status: Former Smoker   • Smokeless tobacco: Never Used   Substance and Sexual Activity   • Alcohol use: No     Comment: caffeine use   • Drug use: No   • Sexual activity: Defer         Review of Systems   Constitution: Positive for malaise/fatigue.   HENT: Negative.    Eyes: Negative.    Cardiovascular: Positive for dyspnea on exertion and near-syncope.   Respiratory: Negative.    Endocrine: Negative.    Skin: Negative.    Musculoskeletal: Negative.    Gastrointestinal: Negative.    Neurological: Negative.    Psychiatric/Behavioral: Negative.        Procedures      ECG 12 Lead  Date/Time: 11/20/2019 12:24  "PM  Performed by: Guido Michael MD  Authorized by: Guido Michael MD   Comparison: compared with previous ECG from 6/13/2018  Comparison to previous ECG: PVCs not present today  Rhythm: sinus rhythm  Rate: normal  Conduction: conduction normal  QRS axis: normal  Other findings: non-specific ST-T wave changes                Objective:    /84 (BP Location: Left arm, Patient Position: Sitting, Cuff Size: Adult)   Pulse 94   Ht 180.3 cm (71\")   Wt 91 kg (200 lb 9.6 oz)   SpO2 98%   BMI 27.98 kg/m²         Physical Exam   Constitutional: He is oriented to person, place, and time. He appears well-developed and well-nourished.   HENT:   Head: Normocephalic.   Eyes: Pupils are equal, round, and reactive to light.   Neck: Normal range of motion. No JVD present. Carotid bruit is not present. No thyromegaly present.   Cardiovascular: Normal rate, regular rhythm, S1 normal, S2 normal, normal heart sounds and intact distal pulses. Exam reveals no gallop and no friction rub.   No murmur heard.  Pulmonary/Chest: Effort normal and breath sounds normal.   Abdominal: Soft. Bowel sounds are normal.   Musculoskeletal: He exhibits no edema.   Neurological: He is alert and oriented to person, place, and time.   Skin: Skin is warm, dry and intact. No erythema.   Psychiatric: He has a normal mood and affect.   Vitals reviewed.          Assessment:       Diagnosis Plan   1. Cardiomyopathy, dilated, nonischemic (CMS/HCC)     2. Ventricular tachycardia (CMS/HCC)         1.  Dilated nonischemic cardiomyopathy: No signs of heart failure at this time.  Class II-III symptoms.  Continue the same.  2.  Ventricular tachycardia: Status post AICD: No recent therapy     Plan:         "

## 2020-01-27 ENCOUNTER — CLINICAL SUPPORT NO REQUIREMENTS (OUTPATIENT)
Dept: CARDIOLOGY | Facility: CLINIC | Age: 80
End: 2020-01-27

## 2020-01-27 DIAGNOSIS — I25.5 ISCHEMIC CARDIOMYOPATHY: Primary | ICD-10-CM

## 2020-01-27 PROCEDURE — 93283 PRGRMG EVAL IMPLANTABLE DFB: CPT | Performed by: INTERNAL MEDICINE

## 2020-01-27 PROCEDURE — 93290 INTERROG DEV EVAL ICPMS IP: CPT | Performed by: INTERNAL MEDICINE

## 2020-03-09 RX ORDER — METOPROLOL TARTRATE 50 MG/1
TABLET, FILM COATED ORAL
Qty: 270 TABLET | Refills: 1 | Status: SHIPPED | OUTPATIENT
Start: 2020-03-09 | End: 2020-09-11

## 2020-04-02 RX ORDER — SPIRONOLACTONE 25 MG/1
TABLET ORAL
Qty: 45 TABLET | Refills: 1 | Status: SHIPPED | OUTPATIENT
Start: 2020-04-02 | End: 2020-09-28

## 2020-04-30 RX ORDER — SACUBITRIL AND VALSARTAN 24; 26 MG/1; MG/1
TABLET, FILM COATED ORAL
Qty: 180 TABLET | Refills: 1 | Status: SHIPPED | OUTPATIENT
Start: 2020-04-30 | End: 2020-10-26

## 2020-05-20 ENCOUNTER — OFFICE VISIT (OUTPATIENT)
Dept: CARDIOLOGY | Facility: CLINIC | Age: 80
End: 2020-05-20

## 2020-05-20 VITALS
BODY MASS INDEX: 27.64 KG/M2 | OXYGEN SATURATION: 98 % | HEART RATE: 94 BPM | SYSTOLIC BLOOD PRESSURE: 120 MMHG | DIASTOLIC BLOOD PRESSURE: 82 MMHG | HEIGHT: 71 IN | WEIGHT: 197.4 LBS

## 2020-05-20 DIAGNOSIS — I47.20 VENTRICULAR TACHYCARDIA (HCC): ICD-10-CM

## 2020-05-20 DIAGNOSIS — I42.0 CARDIOMYOPATHY, DILATED, NONISCHEMIC (HCC): Primary | ICD-10-CM

## 2020-05-20 PROCEDURE — 93000 ELECTROCARDIOGRAM COMPLETE: CPT | Performed by: INTERNAL MEDICINE

## 2020-05-20 PROCEDURE — 99214 OFFICE O/P EST MOD 30 MIN: CPT | Performed by: INTERNAL MEDICINE

## 2020-05-20 NOTE — PROGRESS NOTES
Date of Office Visit: 2020    Patient Name: Harvey Leyva  : 1940    Encounter Provider: Guido Michael MD  Referring Provider: No ref. provider found  Place of Service: [facility] CARDIOLOGY  Patient Care Team:  Wero Chatman MD as PCP - General (Family Medicine)      Chief Complaint   Patient presents with   • Cardiomyopathy     6 mos follow up      History of Present Illness    The patient is an 80-year-old white male with a history of a dilated nonischemic cardiomyopathy as well as ventricular tachycardia.  His ejection fraction remains approximately 30%.  He also has a history of ventricular tachycardia for which she has had an AICD implanted.    Symptomatically the patient feels about the same.  Occasionally he will notice some dizziness if he stands up quickly.  He does not notice the pain complaints of peripheral edema as long as he wears his support stockings.  He denies orthopnea or paroxysmal nocturnal dyspnea.   Past Medical History:   Diagnosis Date   • Basal cell carcinoma    • Cardiomyopathy (CMS/HCC)     nonischemic   • Difficulty breathing    • Dilated cardiomyopathy (CMS/HCC)    • Diverticulosis    • Dizzy    • Dyspnea    • Fatigue    • Hyperlipidemia    • Hypotension     due to drugs   • Lightheaded    • Malaise and fatigue    • Rash, skin    • Ventricular tachycardia (CMS/HCC)          Past Surgical History:   Procedure Laterality Date   • ABDOMINAL HERNIA REPAIR     • BACK SURGERY N/A    • CARDIAC DEFIBRILLATOR PLACEMENT N/A 2009    Prophylactic AICD (Medtronic Secura DR model # G030UZB, serial # [id number])-Dr. Barrett Michael   • CARDIAC ELECTROPHYSIOLOGY PROCEDURE Left 2017    Procedure: Device Replacement   ICD GEN CHG MEDTRONIC;  Surgeon: Guido Michael MD;  Location: [facility] INVASIVE LOCATION;  Service:    • CATARACT EXTRACTION     • COLONOSCOPY N/A 2010    Roel Clancy    • FINE NEEDLE  ASPIRATION N/A 11/29/2009    Pericardial fluid aspiration-Dr. Willian Quintanilla   • INGUINAL HERNIA REPAIR Left    • PROSTATE SURGERY     • VEIN LIGATION AND STRIPPING             Current Outpatient Medications:   •  ALPRAZolam (XANAX) 0.5 MG tablet, Take 0.5 mg by mouth 3 (Three) Times a Day., Disp: , Rfl:   •  ENTRESTO 24-26 MG tablet, TAKE 1 TABLET BY MOUTH TWICE A DAY, Disp: 180 tablet, Rfl: 1  •  esomeprazole (nexIUM) 20 MG capsule, Take 20 mg by mouth Every Morning Before Breakfast., Disp: , Rfl:   •  finasteride (PROSCAR) 5 MG tablet, Take 5 mg by mouth Daily., Disp: , Rfl:   •  furosemide (LASIX) 20 MG tablet, TAKE 1 TABLET BY MOUTH TWICE A DAY, Disp: 180 tablet, Rfl: 3  •  magnesium oxide (MAGOX) 400 (241.3 Mg) MG tablet tablet, Take 400 mg by mouth Daily., Disp: , Rfl:   •  metoprolol tartrate (LOPRESSOR) 50 MG tablet, TAKE 1 TABLET BY MOUTH THREE TIMES A DAY, Disp: 270 tablet, Rfl: 1  •  spironolactone (ALDACTONE) 25 MG tablet, TAKE 1/2 TABLET BY MOUTH EVERY DAY, Disp: 45 tablet, Rfl: 1  •  tamsulosin (FLOMAX) 0.4 MG capsule 24 hr capsule, Take 0.4 mg by mouth 2 (Two) Times a Day., Disp: , Rfl:       Social History     Socioeconomic History   • Marital status:      Spouse name: Not on file   • Number of children: Not on file   • Years of education: Not on file   • Highest education level: Not on file   Tobacco Use   • Smoking status: Former Smoker   • Smokeless tobacco: Never Used   Substance and Sexual Activity   • Alcohol use: No     Comment: caffeine use   • Drug use: No   • Sexual activity: Defer         Review of Systems   Constitution: Positive for malaise/fatigue.   HENT: Negative.    Eyes: Negative.    Cardiovascular: Negative.    Respiratory: Negative.    Endocrine: Negative.    Skin: Negative.    Musculoskeletal: Negative.    Gastrointestinal: Negative.    Neurological: Positive for light-headedness.   Psychiatric/Behavioral: Negative.        Procedures      ECG 12 Lead  Date/Time: 5/20/2020  "12:42 PM  Performed by: Guido Michael MD  Authorized by: Guido Michael MD   Comparison: compared with previous ECG from 11/20/2019  Comparison to previous ECG: Atrial paced  Rate: normal  Conduction: conduction normal  QRS axis: normal                  Objective:    /82   Pulse 94   Ht 180.3 cm (71\")   Wt 89.5 kg (197 lb 6.4 oz)   SpO2 98%   BMI 27.53 kg/m²         Physical Exam   Constitutional: He is oriented to person, place, and time. He appears well-developed and well-nourished.   HENT:   Head: Normocephalic.   Eyes: Pupils are equal, round, and reactive to light.   Neck: Normal range of motion. No JVD present. Carotid bruit is not present. No thyromegaly present.   Cardiovascular: Normal rate, regular rhythm, S1 normal, S2 normal, normal heart sounds and intact distal pulses. Exam reveals no gallop and no friction rub.   No murmur heard.  Pulmonary/Chest: Effort normal and breath sounds normal.   Abdominal: Soft. Bowel sounds are normal.   Musculoskeletal: He exhibits no edema.   Neurological: He is alert and oriented to person, place, and time.   Skin: Skin is warm, dry and intact. No erythema.   Psychiatric: He has a normal mood and affect.   Vitals reviewed.          Assessment:       Diagnosis Plan   1. Cardiomyopathy, dilated, nonischemic (CMS/HCC)     2. Ventricular tachycardia (CMS/HCC)       1.  Dilated nonischemic cardiomyopathy: Class I-II NYHA symptoms.  No overt signs of heart failure on examination  2.  Ventricular tachycardia: Status post AICD implant.  No recent events.    No changes in medication at this time.  Follow-up in 6 months.       Plan:         "

## 2020-09-11 RX ORDER — METOPROLOL TARTRATE 50 MG/1
TABLET, FILM COATED ORAL
Qty: 270 TABLET | Refills: 1 | Status: SHIPPED | OUTPATIENT
Start: 2020-09-11 | End: 2021-02-02

## 2020-09-28 RX ORDER — FUROSEMIDE 20 MG/1
TABLET ORAL
Qty: 180 TABLET | Refills: 3 | Status: SHIPPED | OUTPATIENT
Start: 2020-09-28 | End: 2021-09-20

## 2020-09-28 RX ORDER — SPIRONOLACTONE 25 MG/1
TABLET ORAL
Qty: 45 TABLET | Refills: 1 | Status: SHIPPED | OUTPATIENT
Start: 2020-09-28 | End: 2021-02-02

## 2020-10-26 RX ORDER — SACUBITRIL AND VALSARTAN 24; 26 MG/1; MG/1
TABLET, FILM COATED ORAL
Qty: 180 TABLET | Refills: 1 | Status: SHIPPED | OUTPATIENT
Start: 2020-10-26 | End: 2021-07-30

## 2020-11-25 ENCOUNTER — OFFICE VISIT (OUTPATIENT)
Dept: CARDIOLOGY | Facility: CLINIC | Age: 80
End: 2020-11-25

## 2020-11-25 VITALS
OXYGEN SATURATION: 98 % | WEIGHT: 203 LBS | BODY MASS INDEX: 28.42 KG/M2 | HEART RATE: 81 BPM | HEIGHT: 71 IN | SYSTOLIC BLOOD PRESSURE: 140 MMHG | DIASTOLIC BLOOD PRESSURE: 80 MMHG

## 2020-11-25 DIAGNOSIS — I47.20 VENTRICULAR TACHYCARDIA (HCC): ICD-10-CM

## 2020-11-25 DIAGNOSIS — I42.0 CARDIOMYOPATHY, DILATED, NONISCHEMIC (HCC): Primary | ICD-10-CM

## 2020-11-25 PROCEDURE — 99214 OFFICE O/P EST MOD 30 MIN: CPT | Performed by: INTERNAL MEDICINE

## 2020-11-25 PROCEDURE — 93000 ELECTROCARDIOGRAM COMPLETE: CPT | Performed by: INTERNAL MEDICINE

## 2020-11-25 NOTE — PROGRESS NOTES
Date of Office Visit: 2020    Patient Name: Harvey Leyva  : 1940    Encounter Provider: Guido Michael MD  Referring Provider: No ref. provider found  Place of Service: [facility] CARDIOLOGY  Patient Care Team:  Wero Chatman MD as PCP - General (Family Medicine)      Chief Complaint   Patient presents with   • Coronary Artery Disease   • Irregular Heart Beat     History of Present Illness    Patient is an 80-year-old white male with with a history of dilated nonischemic cardiomyopathy as well as ventricular tachycardia.  Ejection fraction is approximately 30%.  The patient does not have any major change in his symptoms.  He still has intermittent palpitations as well as peripheral edema occasionally he will be lightheaded.  He does not complain of shortness of breath with exertion fatigue or chest pain.    He is ICD has been in place for ventricular tachycardia.  There have not been any recent events.    Past Medical History:   Diagnosis Date   • Basal cell carcinoma    • Cardiomyopathy (CMS/HCC)     nonischemic   • Difficulty breathing    • Dilated cardiomyopathy (CMS/HCC)    • Diverticulosis    • Dizzy    • Dyspnea    • Fatigue    • Hyperlipidemia    • Hypotension     due to drugs   • Lightheaded    • Malaise and fatigue    • Rash, skin    • Ventricular tachycardia (CMS/HCC)          Past Surgical History:   Procedure Laterality Date   • ABDOMINAL HERNIA REPAIR     • BACK SURGERY N/A    • CARDIAC DEFIBRILLATOR PLACEMENT N/A 2009    Prophylactic AICD (Medtronic Secura DR model # [id number], serial # [id number])-Dr. Barrett Michael   • CARDIAC ELECTROPHYSIOLOGY PROCEDURE Left 2017    Procedure: Device Replacement   ICD GEN CHG MEDTRONIC;  Surgeon: Guido Michael MD;  Location: [facility] INVASIVE LOCATION;  Service:    • CATARACT EXTRACTION     • COLONOSCOPY N/A 2010    Roel Clancy    • FINE NEEDLE ASPIRATION N/A 2009     Pericardial fluid aspiration-Dr. Willian Quintanilla   • INGUINAL HERNIA REPAIR Left    • PROSTATE SURGERY     • VEIN LIGATION AND STRIPPING             Current Outpatient Medications:   •  ALPRAZolam (XANAX) 0.5 MG tablet, Take 0.5 mg by mouth 3 (Three) Times a Day., Disp: , Rfl:   •  Entresto 24-26 MG tablet, TAKE 1 TABLET BY MOUTH TWICE A DAY, Disp: 180 tablet, Rfl: 1  •  esomeprazole (nexIUM) 20 MG capsule, Take 20 mg by mouth Every Morning Before Breakfast., Disp: , Rfl:   •  finasteride (PROSCAR) 5 MG tablet, Take 5 mg by mouth Daily., Disp: , Rfl:   •  furosemide (LASIX) 20 MG tablet, TAKE 1 TABLET BY MOUTH TWICE A DAY, Disp: 180 tablet, Rfl: 3  •  magnesium oxide (MAGOX) 400 (241.3 Mg) MG tablet tablet, Take 400 mg by mouth Daily., Disp: , Rfl:   •  metoprolol tartrate (LOPRESSOR) 50 MG tablet, TAKE 1 TABLET BY MOUTH THREE TIMES A DAY, Disp: 270 tablet, Rfl: 1  •  spironolactone (ALDACTONE) 25 MG tablet, TAKE 1/2 TABLET BY MOUTH EVERY DAY, Disp: 45 tablet, Rfl: 1  •  tamsulosin (FLOMAX) 0.4 MG capsule 24 hr capsule, Take 0.4 mg by mouth 2 (Two) Times a Day., Disp: , Rfl:       Social History     Socioeconomic History   • Marital status:      Spouse name: Not on file   • Number of children: Not on file   • Years of education: Not on file   • Highest education level: Not on file   Tobacco Use   • Smoking status: Former Smoker   • Smokeless tobacco: Never Used   Substance and Sexual Activity   • Alcohol use: No     Comment: caffeine use   • Drug use: No   • Sexual activity: Defer         Review of Systems   Constitution: Negative.   HENT: Negative.    Eyes: Negative.    Cardiovascular: Positive for leg swelling and palpitations.   Respiratory: Negative.    Endocrine: Negative.    Skin: Negative.    Musculoskeletal: Negative.    Gastrointestinal: Negative.    Neurological: Positive for light-headedness.   Psychiatric/Behavioral: Negative.        Procedures      ECG 12 Lead    Date/Time: 11/25/2020 11:46  "AM  Performed by: Guido Michael MD  Authorized by: Guido Michael MD   Comparison: compared with previous ECG from 5/20/2020  Comparison to previous ECG: Atrial paced rhythm.  PVCs new  Ectopy: unifocal PVCs  Rate: normal  Conduction: conduction normal  QRS axis: normal                  Objective:    /80 (BP Location: Left arm, Patient Position: Sitting, Cuff Size: Adult)   Pulse 81   Ht 180.3 cm (71\")   Wt 92.1 kg (203 lb)   SpO2 98%   BMI 28.31 kg/m²         Vitals signs reviewed.   Constitutional:       Appearance: Well-developed.   Eyes:      Pupils: Pupils are equal, round, and reactive to light.   HENT:      Head: Normocephalic.   Neck:      Musculoskeletal: Normal range of motion.      Thyroid: No thyromegaly.      Vascular: No carotid bruit or JVD.   Pulmonary:      Effort: Pulmonary effort is normal.      Breath sounds: Normal breath sounds.   Cardiovascular:      Normal rate. Irregular rhythm.      No gallop.   Pulses:     Intact distal pulses.   Edema:     Peripheral edema absent.   Abdominal:      General: Bowel sounds are normal.      Palpations: Abdomen is soft.   Skin:     General: Skin is warm and dry.      Findings: No erythema.   Neurological:      Mental Status: Alert and oriented to person, place, and time.             Assessment:       Diagnosis Plan   1. Cardiomyopathy, dilated, nonischemic (CMS/HCC)     2. Ventricular tachycardia (CMS/HCC)         1.  Cardiomyopathy: Nonischemic.  LVEF 30%.  Class II symptoms.  Remains on Entresto, beta-blocker and diuretic including Aldactone  2.  Ventricular tachycardia: Status post AICD implant         Plan:       1.  No change in medication  2.  Follow-up 6 months  "

## 2021-02-02 RX ORDER — METOPROLOL TARTRATE 50 MG/1
TABLET, FILM COATED ORAL
Qty: 270 TABLET | Refills: 1 | Status: SHIPPED | OUTPATIENT
Start: 2021-02-02 | End: 2021-07-26

## 2021-02-02 RX ORDER — SPIRONOLACTONE 25 MG/1
TABLET ORAL
Qty: 45 TABLET | Refills: 1 | Status: SHIPPED | OUTPATIENT
Start: 2021-02-02 | End: 2021-08-26

## 2021-05-25 ENCOUNTER — CLINICAL SUPPORT NO REQUIREMENTS (OUTPATIENT)
Dept: CARDIOLOGY | Facility: CLINIC | Age: 81
End: 2021-05-25

## 2021-05-25 ENCOUNTER — OFFICE VISIT (OUTPATIENT)
Dept: CARDIOLOGY | Facility: CLINIC | Age: 81
End: 2021-05-25

## 2021-05-25 VITALS
OXYGEN SATURATION: 94 % | HEIGHT: 71 IN | BODY MASS INDEX: 28 KG/M2 | WEIGHT: 200 LBS | DIASTOLIC BLOOD PRESSURE: 76 MMHG | SYSTOLIC BLOOD PRESSURE: 118 MMHG | HEART RATE: 76 BPM

## 2021-05-25 DIAGNOSIS — I47.20 VENTRICULAR TACHYCARDIA (HCC): ICD-10-CM

## 2021-05-25 DIAGNOSIS — I42.0 CARDIOMYOPATHY, DILATED, NONISCHEMIC (HCC): Primary | ICD-10-CM

## 2021-05-25 DIAGNOSIS — I49.5 SICK SINUS SYNDROME (HCC): Primary | ICD-10-CM

## 2021-05-25 PROCEDURE — 93283 PRGRMG EVAL IMPLANTABLE DFB: CPT | Performed by: INTERNAL MEDICINE

## 2021-05-25 PROCEDURE — 99214 OFFICE O/P EST MOD 30 MIN: CPT | Performed by: INTERNAL MEDICINE

## 2021-05-25 PROCEDURE — 93000 ELECTROCARDIOGRAM COMPLETE: CPT | Performed by: INTERNAL MEDICINE

## 2021-05-25 NOTE — PROGRESS NOTES
Date of Office Visit: 2021    Patient Name: Harvey Leyva  : 1940    Encounter Provider: Guido Michael MD  Referring Provider: No ref. provider found  Place of Service: [facility] CARDIOLOGY  Patient Care Team:  Wero Chatman MD as PCP - General (Family Medicine)      Chief Complaint   Patient presents with   • Cardiomyopathy, dilated, nonischemic (   • Follow-up     History of Present Illness  The patient is a 81-year-old white male with a history of a dilated nonischemic cardiomyopathy that has been complicated by ventricular tachycardia.  The patient's ejection fraction is approximately 30%.    The patient reports intermittent palpitations.  He also complains of fatigue and some peripheral edema and shortness of breath with exertion.  His device was checked today, and ICD, still showing episodes of nonsustained ventricular tachycardia.  There is nothing that has substantially changed since his last visit.    Overall the patient states his palpitations are daily but infrequent.  He has not had any direct associated lightheadedness with them but does feel lightheaded in the morning when he first gets up.  He does sit at the side of the bed before standing up and ambulating.  He has not had any syncopal episodes.  He has decreased energy levels and shortness of breath with exertion.  There is no perceptible change in the last 6 months.  He does have some mild lower extremity edema again unchanged from previous.      Past Medical History:   Diagnosis Date   • Basal cell carcinoma    • Cardiomyopathy (CMS/HCC)     nonischemic   • Difficulty breathing    • Dilated cardiomyopathy (CMS/HCC)    • Diverticulosis    • Dizzy    • Dyspnea    • Fatigue    • Hyperlipidemia    • Hypotension     due to drugs   • Lightheaded    • Malaise and fatigue    • Rash, skin    • Ventricular tachycardia (CMS/HCC)          Past Surgical History:   Procedure Laterality Date   •  ABDOMINAL HERNIA REPAIR     • BACK SURGERY N/A    • CARDIAC DEFIBRILLATOR PLACEMENT N/A 07/02/2009    Prophylactic AICD (Medtronic Secura DR model # H534XSJ, serial # [id number])-Dr. Barrett Michael   • CARDIAC ELECTROPHYSIOLOGY PROCEDURE Left 2/20/2017    Procedure: Device Replacement   ICD GEN CHG MEDTRONIC;  Surgeon: Guido Michael MD;  Location: [facility] INVASIVE LOCATION;  Service:    • CATARACT EXTRACTION     • COLONOSCOPY N/A 12/06/2010    Roel Clancy    • FINE NEEDLE ASPIRATION N/A 11/29/2009    Pericardial fluid aspiration-Dr. Willian Quintanilla   • INGUINAL HERNIA REPAIR Left    • PROSTATE SURGERY     • VEIN LIGATION AND STRIPPING             Current Outpatient Medications:   •  ALPRAZolam (XANAX) 0.5 MG tablet, Take 0.5 mg by mouth 3 (Three) Times a Day., Disp: , Rfl:   •  Entresto 24-26 MG tablet, TAKE 1 TABLET BY MOUTH TWICE A DAY, Disp: 180 tablet, Rfl: 1  •  esomeprazole (nexIUM) 20 MG capsule, Take 20 mg by mouth Every Morning Before Breakfast., Disp: , Rfl:   •  finasteride (PROSCAR) 5 MG tablet, Take 5 mg by mouth Daily., Disp: , Rfl:   •  furosemide (LASIX) 20 MG tablet, TAKE 1 TABLET BY MOUTH TWICE A DAY, Disp: 180 tablet, Rfl: 3  •  magnesium oxide (MAGOX) 400 (241.3 Mg) MG tablet tablet, Take 400 mg by mouth Daily., Disp: , Rfl:   •  metoprolol tartrate (LOPRESSOR) 50 MG tablet, TAKE 1 TABLET BY MOUTH THREE TIMES A DAY, Disp: 270 tablet, Rfl: 1  •  spironolactone (ALDACTONE) 25 MG tablet, TAKE 1/2 TABLET BY MOUTH EVERY DAY, Disp: 45 tablet, Rfl: 1  •  tamsulosin (FLOMAX) 0.4 MG capsule 24 hr capsule, Take 0.4 mg by mouth 2 (Two) Times a Day., Disp: , Rfl:       Social History     Socioeconomic History   • Marital status:      Spouse name: Not on file   • Number of children: Not on file   • Years of education: Not on file   • Highest education level: Not on file   Tobacco Use   • Smoking status: Former Smoker   • Smokeless tobacco: Never Used   Substance and Sexual  "Activity   • Alcohol use: No     Comment: caffeine use   • Drug use: No   • Sexual activity: Defer         Review of Systems   Constitutional: Positive for malaise/fatigue.   HENT: Negative.    Eyes: Negative.    Cardiovascular: Positive for dyspnea on exertion, leg swelling and palpitations.   Respiratory: Negative.    Endocrine: Negative.    Skin: Negative.    Musculoskeletal: Negative.    Gastrointestinal: Negative.    Neurological: Negative.    Psychiatric/Behavioral: Negative.        Procedures      ECG 12 Lead    Date/Time: 5/25/2021 12:16 PM  Performed by: Guido Michael MD  Authorized by: Guido Michael MD   Comparison: compared with previous ECG from 11/25/2020  Similar to previous ECG  Comparison to previous ECG: Atrial paced complexes  Conduction: non-specific intraventricular conduction delay                Objective:    /76 (BP Location: Left arm, Patient Position: Sitting)   Pulse 76   Ht 180.3 cm (71\")   Wt 90.7 kg (200 lb)   SpO2 94%   BMI 27.89 kg/m²         Vitals reviewed.   Constitutional:       Appearance: Healthy appearance. Well-developed and not in distress.   Eyes:      Pupils: Pupils are equal, round, and reactive to light.   HENT:      Head: Normocephalic.   Neck:      Thyroid: No thyromegaly.      Vascular: No carotid bruit, JVD or JVR.   Pulmonary:      Effort: Pulmonary effort is normal.      Breath sounds: Normal breath sounds.   Cardiovascular:      Normal rate. Regular rhythm. Normal S1. Normal S2.      Murmurs: There is no murmur.      No gallop. No click. No rub.   Pulses:     Intact distal pulses.   Edema:     Peripheral edema absent.   Abdominal:      General: Bowel sounds are normal.      Palpations: Abdomen is soft.   Musculoskeletal:      Cervical back: Normal range of motion. Skin:     General: Skin is warm and dry.      Findings: No erythema.   Neurological:      Mental Status: Alert and oriented to person, place, and time.             Assessment:      "    Diagnosis Plan   1. Cardiomyopathy, dilated, nonischemic (CMS/HCC)     2. Ventricular tachycardia (CMS/HCC)       1.  Dilated nonischemic cardiomyopathy: Left ventricular ejection fraction 30%.  He remains on guideline directed therapy with Entresto and Aldactone.  He is also on metoprolol.  2.  Ventricular tachycardia: Status post AICD.  He is on magnesium oxide as well.       Plan:       Continue current meds.  We will continue to monitor ICD function.  Follow-up 6 months.

## 2021-07-26 RX ORDER — METOPROLOL TARTRATE 50 MG/1
TABLET, FILM COATED ORAL
Qty: 270 TABLET | Refills: 1 | Status: SHIPPED | OUTPATIENT
Start: 2021-07-26 | End: 2022-01-25

## 2021-07-30 RX ORDER — SACUBITRIL AND VALSARTAN 24; 26 MG/1; MG/1
TABLET, FILM COATED ORAL
Qty: 180 TABLET | Refills: 1 | Status: SHIPPED | OUTPATIENT
Start: 2021-07-30 | End: 2022-01-25

## 2021-07-31 PROCEDURE — 93295 DEV INTERROG REMOTE 1/2/MLT: CPT | Performed by: INTERNAL MEDICINE

## 2021-07-31 PROCEDURE — 93296 REM INTERROG EVL PM/IDS: CPT | Performed by: INTERNAL MEDICINE

## 2021-08-26 RX ORDER — SPIRONOLACTONE 25 MG/1
TABLET ORAL
Qty: 45 TABLET | Refills: 1 | Status: SHIPPED | OUTPATIENT
Start: 2021-08-26 | End: 2022-02-23

## 2021-09-20 RX ORDER — FUROSEMIDE 20 MG/1
TABLET ORAL
Qty: 180 TABLET | Refills: 0 | Status: SHIPPED | OUTPATIENT
Start: 2021-09-20 | End: 2022-01-11

## 2021-10-30 PROCEDURE — 93296 REM INTERROG EVL PM/IDS: CPT | Performed by: INTERNAL MEDICINE

## 2021-10-30 PROCEDURE — 93295 DEV INTERROG REMOTE 1/2/MLT: CPT | Performed by: INTERNAL MEDICINE

## 2021-12-15 ENCOUNTER — CLINICAL SUPPORT NO REQUIREMENTS (OUTPATIENT)
Dept: CARDIOLOGY | Facility: CLINIC | Age: 81
End: 2021-12-15

## 2021-12-15 ENCOUNTER — OFFICE VISIT (OUTPATIENT)
Dept: CARDIOLOGY | Facility: CLINIC | Age: 81
End: 2021-12-15

## 2021-12-15 VITALS
SYSTOLIC BLOOD PRESSURE: 104 MMHG | WEIGHT: 197 LBS | HEIGHT: 71 IN | HEART RATE: 86 BPM | DIASTOLIC BLOOD PRESSURE: 78 MMHG | OXYGEN SATURATION: 96 % | BODY MASS INDEX: 27.58 KG/M2

## 2021-12-15 DIAGNOSIS — I47.20 VENTRICULAR TACHYCARDIA (HCC): Primary | ICD-10-CM

## 2021-12-15 DIAGNOSIS — I42.0 CARDIOMYOPATHY, DILATED, NONISCHEMIC (HCC): Primary | ICD-10-CM

## 2021-12-15 DIAGNOSIS — I47.20 VENTRICULAR TACHYCARDIA (HCC): ICD-10-CM

## 2021-12-15 PROBLEM — E78.5 HYPERLIPIDEMIA LDL GOAL <100: Status: ACTIVE | Noted: 2021-12-15

## 2021-12-15 PROCEDURE — 93283 PRGRMG EVAL IMPLANTABLE DFB: CPT | Performed by: INTERNAL MEDICINE

## 2021-12-15 PROCEDURE — 93290 INTERROG DEV EVAL ICPMS IP: CPT | Performed by: INTERNAL MEDICINE

## 2021-12-15 PROCEDURE — 99214 OFFICE O/P EST MOD 30 MIN: CPT | Performed by: INTERNAL MEDICINE

## 2021-12-15 RX ORDER — PRAVASTATIN SODIUM 10 MG
1 TABLET ORAL DAILY
COMMUNITY
Start: 2021-12-02

## 2021-12-15 NOTE — PROGRESS NOTES
OFFICE VISIT      Date of Office Visit: 12/15/2021    Patient Name: Harvey Leyva  : 1940    Encounter Provider: Guido Mihcael MD  Referring Provider: No ref. provider found  Primary Care Provider: Wero Chatman MD  Place of Service: [facility] CARDIOLOGY        Chief Complaint   Patient presents with   • Cardiomyopathy, dilated, nonischemic   • Follow-up     History of Present Illness  The patient is an 81-year-old white male with a dilated nonischemic cardiomyopathy as well as ventricular tachycardia.  He is here today for follow-up.    The patient's ICD was checked today.  There have not been any recent events.  His fluid balance appears to be okay.    From a symptom standpoint the patient is feeling reasonably well.  He is not complaining of any significant shortness of breath.  He denies any lightheadedness or dizziness.  He does have some complaints of fatigue and occasionally peripheral edema.         Past Medical History:   Diagnosis Date   • Basal cell carcinoma    • Cardiomyopathy (HCC)     nonischemic   • Difficulty breathing    • Dilated cardiomyopathy (HCC)    • Diverticulosis    • Dizzy    • Dyspnea    • Fatigue    • Hyperlipidemia    • Hypotension     due to drugs   • Lightheaded    • Malaise and fatigue    • Rash, skin    • Ventricular tachycardia (HCC)          Past Surgical History:   Procedure Laterality Date   • ABDOMINAL HERNIA REPAIR     • BACK SURGERY N/A    • CARDIAC DEFIBRILLATOR PLACEMENT N/A 2009    Prophylactic AICD (Medtronic Secura DR model # H661QIN, serial # [id number])-Dr. Barrett Michael   • CARDIAC ELECTROPHYSIOLOGY PROCEDURE Left 2017    Procedure: Device Replacement   ICD GEN CHG MEDTRONIC;  Surgeon: Guido Michael MD;  Location: [facility] INVASIVE LOCATION;  Service:    • CATARACT EXTRACTION     • COLONOSCOPY N/A 2010    Roel Clancy    • FINE NEEDLE ASPIRATION N/A 2009    Pericardial  fluid aspiration-Dr. Willian Quintanilla   • INGUINAL HERNIA REPAIR Left    • PROSTATE SURGERY     • VEIN LIGATION AND STRIPPING             Current Outpatient Medications:   •  ALPRAZolam (XANAX) 0.5 MG tablet, Take 0.5 mg by mouth 3 (Three) Times a Day., Disp: , Rfl:   •  Entresto 24-26 MG tablet, TAKE 1 TABLET BY MOUTH TWICE A DAY, Disp: 180 tablet, Rfl: 1  •  esomeprazole (nexIUM) 20 MG capsule, Take 20 mg by mouth Every Morning Before Breakfast., Disp: , Rfl:   •  finasteride (PROSCAR) 5 MG tablet, Take 5 mg by mouth Daily., Disp: , Rfl:   •  furosemide (LASIX) 20 MG tablet, TAKE 1 TABLET BY MOUTH TWICE A DAY, Disp: 180 tablet, Rfl: 0  •  magnesium oxide (MAGOX) 400 (241.3 Mg) MG tablet tablet, Take 400 mg by mouth Daily., Disp: , Rfl:   •  metoprolol tartrate (LOPRESSOR) 50 MG tablet, TAKE 1 TABLET BY MOUTH THREE TIMES A DAY, Disp: 270 tablet, Rfl: 1  •  pravastatin (PRAVACHOL) 10 MG tablet, Take 1 tablet by mouth Daily., Disp: , Rfl:   •  spironolactone (ALDACTONE) 25 MG tablet, TAKE 1/2 TABLET BY MOUTH EVERY DAY, Disp: 45 tablet, Rfl: 1  •  tamsulosin (FLOMAX) 0.4 MG capsule 24 hr capsule, Take 0.4 mg by mouth 2 (Two) Times a Day., Disp: , Rfl:       Social History     Socioeconomic History   • Marital status:    Tobacco Use   • Smoking status: Former Smoker   • Smokeless tobacco: Never Used   Substance and Sexual Activity   • Alcohol use: No     Comment: caffeine use   • Drug use: No   • Sexual activity: Defer         Review of Systems   Constitutional: Positive for malaise/fatigue.   HENT: Negative.    Eyes: Negative.    Cardiovascular: Positive for dyspnea on exertion and leg swelling.   Respiratory: Negative.    Endocrine: Negative.    Skin: Negative.    Musculoskeletal: Negative.    Gastrointestinal: Negative.    Neurological: Negative.    Psychiatric/Behavioral: Negative.        Procedures    Procedures        Objective:    /78 (BP Location: Left arm, Patient Position: Sitting)   Pulse 86    "Ht 180.3 cm (71\")   Wt 89.4 kg (197 lb)   SpO2 96%   BMI 27.48 kg/m²         Vitals reviewed.   Constitutional:       Appearance: Healthy appearance. Well-developed and not in distress.   HENT:      Head: Normocephalic.   Neck:      Thyroid: No thyromegaly.      Vascular: No carotid bruit or JVD.   Pulmonary:      Effort: Pulmonary effort is normal.      Breath sounds: Normal breath sounds.   Cardiovascular:      Normal rate. Regular rhythm. Normal S1. Normal S2.      Murmurs: There is no murmur.      No gallop.   Pulses:     Intact distal pulses.   Edema:     Peripheral edema absent.   Skin:     General: Skin is warm and dry.      Findings: No erythema.   Neurological:      Mental Status: Alert and oriented to person, place, and time.             Assessment:       Diagnosis Plan   1. Cardiomyopathy, dilated, nonischemic (HCC)     2. Ventricular tachycardia (HCC)         1.  Dilated nonischemic cardiomyopathy: LVEF approximately 30 to 35%.  No signs of heart failure at this point  2.  Ventricular tachycardia: Status post ICD.  No recent events     Plan:         "

## 2022-01-11 RX ORDER — FUROSEMIDE 20 MG/1
TABLET ORAL
Qty: 180 TABLET | Refills: 1 | Status: SHIPPED | OUTPATIENT
Start: 2022-01-11 | End: 2022-06-07

## 2022-01-25 RX ORDER — SACUBITRIL AND VALSARTAN 24; 26 MG/1; MG/1
TABLET, FILM COATED ORAL
Qty: 180 TABLET | Refills: 1 | Status: SHIPPED | OUTPATIENT
Start: 2022-01-25 | End: 2022-06-07

## 2022-01-25 RX ORDER — METOPROLOL TARTRATE 50 MG/1
TABLET, FILM COATED ORAL
Qty: 270 TABLET | Refills: 1 | Status: SHIPPED | OUTPATIENT
Start: 2022-01-25 | End: 2022-05-09

## 2022-01-25 NOTE — TELEPHONE ENCOUNTER
Please advise filling Metoprolol - This did not meet protocol.    LOV   -   12/15/21  Next   -   6/17/22  Last labs   -   11/30/21 Lipid, CMP    Dayna FLORES

## 2022-01-29 PROCEDURE — 93296 REM INTERROG EVL PM/IDS: CPT | Performed by: INTERNAL MEDICINE

## 2022-01-29 PROCEDURE — 93295 DEV INTERROG REMOTE 1/2/MLT: CPT | Performed by: INTERNAL MEDICINE

## 2022-02-23 RX ORDER — SPIRONOLACTONE 25 MG/1
TABLET ORAL
Qty: 45 TABLET | Refills: 1 | Status: SHIPPED | OUTPATIENT
Start: 2022-02-23 | End: 2022-06-07

## 2022-05-09 RX ORDER — METOPROLOL TARTRATE 50 MG/1
TABLET, FILM COATED ORAL
Qty: 270 TABLET | Refills: 1 | Status: SHIPPED | OUTPATIENT
Start: 2022-05-09 | End: 2022-12-21

## 2022-06-07 RX ORDER — FUROSEMIDE 20 MG/1
TABLET ORAL
Qty: 180 TABLET | Refills: 1 | Status: SHIPPED | OUTPATIENT
Start: 2022-06-07 | End: 2022-12-09

## 2022-06-07 RX ORDER — SPIRONOLACTONE 25 MG/1
TABLET ORAL
Qty: 45 TABLET | Refills: 1 | Status: SHIPPED | OUTPATIENT
Start: 2022-06-07 | End: 2023-01-19

## 2022-06-07 RX ORDER — SACUBITRIL AND VALSARTAN 24; 26 MG/1; MG/1
TABLET, FILM COATED ORAL
Qty: 180 TABLET | Refills: 1 | Status: SHIPPED | OUTPATIENT
Start: 2022-06-07 | End: 2022-12-28

## 2022-06-10 NOTE — PROGRESS NOTES
Date of Office Visit: 2022  Encounter Provider: ALYSON Rice  Place of Service: [facility] CARDIOLOGY  Patient Name: Harvey Leyva  :1940    Chief Complaint   Patient presents with   • Follow-up   • Palpitations   • Shortness of Breath   :     HPI: Harvey Leyva is a 82 y.o. male who is a patient of Dr. Michael.  He is new to me today and presents for 6-month follow-up and pacemaker check.  He has a history of dilated nonischemic cardiomyopathy as well as ventricular tachycardia.  ICD was placed in 2017 and he was started on magnesium supplement.  He was last seen by Dr. Michael in 2021 and was doing reasonably well with only some complaints of fatigue and some occasional peripheral edema.  At that time ICD was normal function and no events recorded.    Most recent 2D echocardiogram in 2017 showed an LV systolic function moderately decreased with an EF 30%, mild aortic insufficiency and mild tricuspid regurgitation with normal RVSP.  IVC normal size with normal inspiratory collapse.     Today patient presents with no complaints of chest pain, shortness of breath or edema.  He states that he has palpitations occasionally and at times will take an extra 25 mg of metoprolol.  He endorses some dizziness with standing that resolves quickly.  Pacemaker interrogation today shows normal functioning device.  1 episode of SVT with a V rate of 154 for 6 beats was the event noted.  States that he gets tired sometimes.  This has been ongoing for the last few months.  Blood pressure is soft at 98/70.  Patient states his systolic blood pressure is usually in the 100-120.        Previous testing and notes have been reviewed by me.   Past Medical History:   Diagnosis Date   • Basal cell carcinoma    • Cardiomyopathy (HCC)     nonischemic   • Difficulty breathing    • Dilated cardiomyopathy (HCC)    • Diverticulosis    • Dizzy    • Dyspnea    • Fatigue    •  Hyperlipidemia    • Hypotension     due to drugs   • Lightheaded    • Malaise and fatigue    • Rash, skin    • Ventricular tachycardia (HCC)        Past Surgical History:   Procedure Laterality Date   • ABDOMINAL HERNIA REPAIR     • BACK SURGERY N/A    • CARDIAC DEFIBRILLATOR PLACEMENT N/A 07/02/2009    Prophylactic AICD (Medtronic Secura DR model # [id number], serial # [id number])-Dr. Barrett Michael   • CARDIAC ELECTROPHYSIOLOGY PROCEDURE Left 2/20/2017    Procedure: Device Replacement   ICD GEN CHG MEDTRONIC;  Surgeon: Guido Michael MD;  Location: [facility] INVASIVE LOCATION;  Service:    • CATARACT EXTRACTION     • COLONOSCOPY N/A 12/06/2010    Roel Clancy    • FINE NEEDLE ASPIRATION N/A 11/29/2009    Pericardial fluid aspiration-Dr. Willian Quintanilla   • INGUINAL HERNIA REPAIR Left    • PROSTATE SURGERY     • VEIN LIGATION AND STRIPPING         Social History     Socioeconomic History   • Marital status:    Tobacco Use   • Smoking status: Former Smoker   • Smokeless tobacco: Never Used   Substance and Sexual Activity   • Alcohol use: No     Comment: caffeine use   • Drug use: No   • Sexual activity: Defer       Family History   Problem Relation Age of Onset   • Heart failure Mother    • Prostate cancer Father    • Heart disease Brother    • Prostate cancer Brother        Review of Systems   Constitutional: Negative.   HENT: Negative.    Eyes: Negative.    Cardiovascular: Positive for irregular heartbeat.   Respiratory: Negative.    Hematologic/Lymphatic: Negative.    Skin: Negative.    Musculoskeletal: Negative.    Gastrointestinal: Negative.    Genitourinary: Negative.    Neurological: Negative.    Psychiatric/Behavioral: Negative.    Allergic/Immunologic: Negative.        No Known Allergies      Current Outpatient Medications:   •  ALPRAZolam (XANAX) 0.5 MG tablet, Take 0.5 mg by mouth 3 (Three) Times a Day., Disp: , Rfl:   •  Entresto 24-26 MG tablet, TAKE 1 TABLET BY MOUTH TWICE A DAY,  "Disp: 180 tablet, Rfl: 1  •  finasteride (PROSCAR) 5 MG tablet, Take 5 mg by mouth Daily., Disp: , Rfl:   •  furosemide (LASIX) 20 MG tablet, TAKE 1 TABLET BY MOUTH TWICE A DAY, Disp: 180 tablet, Rfl: 1  •  magnesium oxide (MAGOX) 400 (241.3 Mg) MG tablet tablet, Take 400 mg by mouth Daily., Disp: , Rfl:   •  metoprolol tartrate (LOPRESSOR) 50 MG tablet, TAKE 1 TABLET BY MOUTH THREE TIMES A DAY, Disp: 270 tablet, Rfl: 1  •  pravastatin (PRAVACHOL) 10 MG tablet, Take 1 tablet by mouth Daily., Disp: , Rfl:   •  spironolactone (ALDACTONE) 25 MG tablet, TAKE 1/2 TABLET BY MOUTH EVERY DAY, Disp: 45 tablet, Rfl: 1  •  tamsulosin (FLOMAX) 0.4 MG capsule 24 hr capsule, Take 0.4 mg by mouth 2 (Two) Times a Day., Disp: , Rfl:   •  esomeprazole (nexIUM) 20 MG capsule, Take 20 mg by mouth Every Morning Before Breakfast., Disp: , Rfl:       Objective:     Vitals:    06/13/22 1250   BP: 98/70   BP Location: Left arm   Patient Position: Sitting   Pulse: 76   Weight: 87.1 kg (192 lb)   Height: 180.3 cm (71\")     Body mass index is 26.78 kg/m².    PHYSICAL EXAM:    Constitutional:       Appearance: Healthy appearance. Not in distress.   Neck:      Vascular: No JVR. JVD normal.   Pulmonary:      Effort: Pulmonary effort is normal.      Breath sounds: Normal breath sounds. No wheezing. No rhonchi. No rales.   Chest:      Chest wall: Not tender to palpatation.   Cardiovascular:      PMI at left midclavicular line. Normal rate. Regular rhythm. Normal S1. Normal S2.      Murmurs: There is no murmur.      No gallop. No click. No rub.   Pulses:     Intact distal pulses.   Edema:     Peripheral edema absent.   Abdominal:      General: Bowel sounds are normal.      Palpations: Abdomen is soft.      Tenderness: There is no abdominal tenderness.   Musculoskeletal: Normal range of motion.         General: No tenderness. Skin:     General: Skin is warm and dry.   Neurological:      General: No focal deficit present.      Mental Status: Alert and " oriented to person, place and time.           ECG 12 Lead    Date/Time: 6/13/2022 1:39 PM  Performed by: Anat Morel APRN  Authorized by: Anat Morel APRN   Comparison: compared with previous ECG from 5/25/2021  Rhythm: sinus rhythm  Rate: normal  BPM: 76  ST Depression: II  Other findings: T wave abnormality              Assessment:       Diagnosis Plan   1. Cardiomyopathy, dilated, nonischemic (HCC)  Adult Transthoracic Echo Complete W/ Cont if Necessary Per Protocol   2. Ventricular tachycardia (HCC)  Adult Transthoracic Echo Complete W/ Cont if Necessary Per Protocol     Orders Placed This Encounter   Procedures   • Adult Transthoracic Echo Complete W/ Cont if Necessary Per Protocol     Standing Status:   Future     Standing Expiration Date:   6/13/2023     Order Specific Question:   Reason for exam?     Answer:   Dyspnea          Plan:       1.  Nonischemic dilated cardiomyopathy: ICD in place.  LVEF approximately 30 to 35%.  Obtain 2D echocardiogram as last echo was in 2017.  Patient has been on guideline directed medical therapy including Entresto, Lasix, spironolactone, beta-blocker and statin therapy.  2.  Ventricular tachycardia: Status post ICD.  1 episode NSVT noted on pacemaker interrogation.  Patient is  on scheduled metoprolol and will take an extra 25 mg of metoprolol for palpitations.  He also continues to take magnesium supplement daily.    Mr. Leyva will follow with Dr. Reagan in 6 months.  He will call sooner for any questions or concerns.         Your medication list          Accurate as of June 13, 2022  1:34 PM. If you have any questions, ask your nurse or doctor.            CONTINUE taking these medications      Instructions Last Dose Given Next Dose Due   ALPRAZolam 0.5 MG tablet  Commonly known as: XANAX      Take 0.5 mg by mouth 3 (Three) Times a Day.       Entresto 24-26 MG tablet  Generic drug: sacubitril-valsartan      TAKE 1 TABLET BY MOUTH TWICE A DAY       esomeprazole  20 MG capsule  Commonly known as: nexIUM      Take 20 mg by mouth Every Morning Before Breakfast.       finasteride 5 MG tablet  Commonly known as: PROSCAR      Take 5 mg by mouth Daily.       furosemide 20 MG tablet  Commonly known as: LASIX      TAKE 1 TABLET BY MOUTH TWICE A DAY       magnesium oxide 400 (241.3 Mg) MG tablet tablet  Commonly known as: MAGOX      Take 400 mg by mouth Daily.       metoprolol tartrate 50 MG tablet  Commonly known as: LOPRESSOR      TAKE 1 TABLET BY MOUTH THREE TIMES A DAY       pravastatin 10 MG tablet  Commonly known as: PRAVACHOL      Take 1 tablet by mouth Daily.       spironolactone 25 MG tablet  Commonly known as: ALDACTONE      TAKE 1/2 TABLET BY MOUTH EVERY DAY       tamsulosin 0.4 MG capsule 24 hr capsule  Commonly known as: FLOMAX      Take 0.4 mg by mouth 2 (Two) Times a Day.                As always, it has been a pleasure to participate in your patient's care.      Sincerely,       ALYSON Hamm

## 2022-06-13 ENCOUNTER — CLINICAL SUPPORT NO REQUIREMENTS (OUTPATIENT)
Dept: CARDIOLOGY | Facility: CLINIC | Age: 82
End: 2022-06-13

## 2022-06-13 ENCOUNTER — OFFICE VISIT (OUTPATIENT)
Dept: CARDIOLOGY | Facility: CLINIC | Age: 82
End: 2022-06-13

## 2022-06-13 VITALS
DIASTOLIC BLOOD PRESSURE: 70 MMHG | BODY MASS INDEX: 26.88 KG/M2 | SYSTOLIC BLOOD PRESSURE: 98 MMHG | WEIGHT: 192 LBS | HEIGHT: 71 IN | HEART RATE: 76 BPM

## 2022-06-13 DIAGNOSIS — I42.0 CARDIOMYOPATHY, DILATED, NONISCHEMIC: Primary | ICD-10-CM

## 2022-06-13 DIAGNOSIS — I47.20 VENTRICULAR TACHYCARDIA: ICD-10-CM

## 2022-06-13 PROCEDURE — 93000 ELECTROCARDIOGRAM COMPLETE: CPT | Performed by: NURSE PRACTITIONER

## 2022-06-13 PROCEDURE — 93283 PRGRMG EVAL IMPLANTABLE DFB: CPT | Performed by: INTERNAL MEDICINE

## 2022-06-13 PROCEDURE — 99214 OFFICE O/P EST MOD 30 MIN: CPT | Performed by: NURSE PRACTITIONER

## 2022-07-07 ENCOUNTER — HOSPITAL ENCOUNTER (OUTPATIENT)
Dept: CARDIOLOGY | Facility: HOSPITAL | Age: 82
Discharge: HOME | End: 2022-07-07
Admitting: NURSE PRACTITIONER

## 2022-07-07 VITALS
HEART RATE: 69 BPM | DIASTOLIC BLOOD PRESSURE: 78 MMHG | HEIGHT: 70 IN | WEIGHT: 192 LBS | SYSTOLIC BLOOD PRESSURE: 150 MMHG | BODY MASS INDEX: 27.49 KG/M2 | OXYGEN SATURATION: 94 %

## 2022-07-07 DIAGNOSIS — I42.0 CARDIOMYOPATHY, DILATED, NONISCHEMIC: ICD-10-CM

## 2022-07-07 DIAGNOSIS — I47.20 VENTRICULAR TACHYCARDIA: ICD-10-CM

## 2022-07-07 LAB
AORTIC ARCH: 2.5 CM
ASCENDING AORTA: 3 CM
BH CV ECHO MEAS - ACS: 2.24 CM
BH CV ECHO MEAS - AO MAX PG: 4.8 MMHG
BH CV ECHO MEAS - AO MEAN PG: 3 MMHG
BH CV ECHO MEAS - AO ROOT DIAM: 3.7 CM
BH CV ECHO MEAS - AO V2 MAX: 109.9 CM/SEC
BH CV ECHO MEAS - AO V2 VTI: 24.9 CM
BH CV ECHO MEAS - AVA(I,D): 2.1 CM2
BH CV ECHO MEAS - EDV(CUBED): 65.2 ML
BH CV ECHO MEAS - EDV(MOD-SP2): 162 ML
BH CV ECHO MEAS - EDV(MOD-SP4): 112 ML
BH CV ECHO MEAS - EF(MOD-BP): 43 %
BH CV ECHO MEAS - EF(MOD-SP2): 42 %
BH CV ECHO MEAS - EF(MOD-SP4): 41.1 %
BH CV ECHO MEAS - ESV(CUBED): 37.3 ML
BH CV ECHO MEAS - ESV(MOD-SP2): 94 ML
BH CV ECHO MEAS - ESV(MOD-SP4): 66 ML
BH CV ECHO MEAS - FS: 17 %
BH CV ECHO MEAS - IVS/LVPW: 1.01 CM
BH CV ECHO MEAS - IVSD: 1.32 CM
BH CV ECHO MEAS - LAT PEAK E' VEL: 6.3 CM/SEC
BH CV ECHO MEAS - LV DIASTOLIC VOL/BSA (35-75): 54 CM2
BH CV ECHO MEAS - LV MASS(C)D: 192.6 GRAMS
BH CV ECHO MEAS - LV MAX PG: 1.08 MMHG
BH CV ECHO MEAS - LV MEAN PG: 0.63 MMHG
BH CV ECHO MEAS - LV SYSTOLIC VOL/BSA (12-30): 31.8 CM2
BH CV ECHO MEAS - LV V1 MAX: 51.8 CM/SEC
BH CV ECHO MEAS - LV V1 VTI: 12.8 CM
BH CV ECHO MEAS - LVIDD: 4 CM
BH CV ECHO MEAS - LVIDS: 3.3 CM
BH CV ECHO MEAS - LVOT AREA: 4.1 CM2
BH CV ECHO MEAS - LVOT DIAM: 2.28 CM
BH CV ECHO MEAS - LVPWD: 1.32 CM
BH CV ECHO MEAS - MED PEAK E' VEL: 6.3 CM/SEC
BH CV ECHO MEAS - MV A DUR: 0.11 SEC
BH CV ECHO MEAS - MV A MAX VEL: 74.6 CM/SEC
BH CV ECHO MEAS - MV DEC SLOPE: 173.9 CM/SEC2
BH CV ECHO MEAS - MV DEC TIME: 0.37 MSEC
BH CV ECHO MEAS - MV E MAX VEL: 49.7 CM/SEC
BH CV ECHO MEAS - MV E/A: 0.67
BH CV ECHO MEAS - MV MAX PG: 2.6 MMHG
BH CV ECHO MEAS - MV MEAN PG: 1.34 MMHG
BH CV ECHO MEAS - MV P1/2T: 118.9 MSEC
BH CV ECHO MEAS - MV V2 VTI: 30.4 CM
BH CV ECHO MEAS - MVA(P1/2T): 1.85 CM2
BH CV ECHO MEAS - MVA(VTI): 1.72 CM2
BH CV ECHO MEAS - PA ACC TIME: 0.08 SEC
BH CV ECHO MEAS - PA PR(ACCEL): 41 MMHG
BH CV ECHO MEAS - PA V2 MAX: 101.8 CM/SEC
BH CV ECHO MEAS - PULM A REVS DUR: 0.14 SEC
BH CV ECHO MEAS - PULM A REVS VEL: 19.6 CM/SEC
BH CV ECHO MEAS - PULM DIAS VEL: 39.7 CM/SEC
BH CV ECHO MEAS - PULM S/D: 1.18
BH CV ECHO MEAS - PULM SYS VEL: 46.6 CM/SEC
BH CV ECHO MEAS - QP/QS: 0.64
BH CV ECHO MEAS - RAP SYSTOLE: 3 MMHG
BH CV ECHO MEAS - RV MAX PG: 0.94 MMHG
BH CV ECHO MEAS - RV V1 MAX: 48.4 CM/SEC
BH CV ECHO MEAS - RV V1 VTI: 10.6 CM
BH CV ECHO MEAS - RVOT DIAM: 2 CM
BH CV ECHO MEAS - RVSP: 29 MMHG
BH CV ECHO MEAS - SI(MOD-SP2): 32.8 ML/M2
BH CV ECHO MEAS - SI(MOD-SP4): 22.2 ML/M2
BH CV ECHO MEAS - SUP REN AO DIAM: 2.3 CM
BH CV ECHO MEAS - SV(LVOT): 52.2 ML
BH CV ECHO MEAS - SV(MOD-SP2): 68 ML
BH CV ECHO MEAS - SV(MOD-SP4): 46 ML
BH CV ECHO MEAS - SV(RVOT): 33.3 ML
BH CV ECHO MEAS - TAPSE (>1.6): 1.89 CM
BH CV ECHO MEAS - TR MAX PG: 25.8 MMHG
BH CV ECHO MEAS - TR MAX VEL: 253.8 CM/SEC
BH CV ECHO MEASUREMENTS AVERAGE E/E' RATIO: 7.89
BH CV XLRA - RV BASE: 2.9 CM
BH CV XLRA - RV LENGTH: 8.6 CM
BH CV XLRA - RV MID: 2.7 CM
BH CV XLRA - TDI S': 10 CM/SEC
LEFT ATRIUM VOLUME INDEX: 33.9 ML/M2
MAXIMAL PREDICTED HEART RATE: 136 BPM
SINUS: 3.7 CM
STJ: 3.1 CM
STRESS TARGET HR: 116 BPM

## 2022-07-07 PROCEDURE — 93306 TTE W/DOPPLER COMPLETE: CPT | Performed by: INTERNAL MEDICINE

## 2022-07-07 PROCEDURE — 93306 TTE W/DOPPLER COMPLETE: CPT

## 2022-07-07 PROCEDURE — 25010000002 PERFLUTREN (DEFINITY) 8.476 MG IN SODIUM CHLORIDE (PF) 0.9 % 10 ML INJECTION: Performed by: NURSE PRACTITIONER

## 2022-07-07 RX ADMIN — PERFLUTREN 1.5 ML: 6.52 INJECTION, SUSPENSION INTRAVENOUS at 10:24

## 2022-07-08 ENCOUNTER — TELEPHONE (OUTPATIENT)
Dept: CARDIOLOGY | Facility: CLINIC | Age: 82
End: 2022-07-08

## 2022-12-05 NOTE — PROGRESS NOTES
RM:________     PCP: Wero Chatman MD    : 1940  AGE: 82 y.o.  EST PATIENT   REASON FOR VISIT/  CC:    BP Readings from Last 3 Encounters:   22 150/78   22 98/70   12/15/21 104/78        WT: ____________ BP: __________L __________R HR______    CHEST PAIN: _____________    SOA: _____________PALPS: _______________     LIGHTHEADED: ___________FATIGUE: ________________ EDEMA __________    ALLERGIES:Patient has no known allergies. SMOKING HISTORY:  Social History     Tobacco Use   • Smoking status: Former   • Smokeless tobacco: Never   Substance Use Topics   • Alcohol use: No     Comment: caffeine use   • Drug use: No     CAFFEINE USE_________________  ALCOHOL ______________________    Below is the patient's most recent value for Albumin, ALT, AST, BUN, Calcium, Chloride, Cholesterol, CO2, Creatinine, GFR, Glucose, HDL, Hematocrit, Hemoglobin, Hemoglobin A1C, LDL, Magnesium, Phosphorus, Platelets, Potassium, PSA, Sodium, Triglycerides, TSH and WBC.   Lab Results   Component Value Date    ALBUMIN 4.3 2022    ALT 24 2022    AST 27 2022    BUN 11 2022    CALCIUM 9.3 2022     2022    CO2 29 2022    CREATININE 0.92 2022     (H) 2022    HDL 40 2020    HCT 50.5 2022    HGB 16.1 2022    HGBA1C 5.8 (H) 2021    LDL 95 2020    MG 2.1 2016     2022    K 4.0 2022    PSA 0.21 2021     2022    TRIG 85 2020    WBC 6.69 2022          NEW DIAGNOSIS/ SURGERY/ HOSP OR ED VISITS: ______________________    __________________________________________________________________      RECENT LABS OR DIAGNOSTIC TESTING:  _____________________________    __________________________________________________________________      ASSESSMENT/ PLAN: _______________________________________________    __________________________________________________________________

## 2022-12-09 RX ORDER — FUROSEMIDE 20 MG/1
TABLET ORAL
Qty: 180 TABLET | Refills: 1 | Status: SHIPPED | OUTPATIENT
Start: 2022-12-09

## 2022-12-21 RX ORDER — METOPROLOL TARTRATE 50 MG/1
TABLET, FILM COATED ORAL
Qty: 270 TABLET | Refills: 1 | Status: SHIPPED | OUTPATIENT
Start: 2022-12-21

## 2022-12-28 ENCOUNTER — OFFICE VISIT (OUTPATIENT)
Dept: CARDIOLOGY | Facility: CLINIC | Age: 82
End: 2022-12-28

## 2022-12-28 ENCOUNTER — CLINICAL SUPPORT NO REQUIREMENTS (OUTPATIENT)
Dept: CARDIOLOGY | Facility: CLINIC | Age: 82
End: 2022-12-28

## 2022-12-28 VITALS
SYSTOLIC BLOOD PRESSURE: 118 MMHG | WEIGHT: 190.4 LBS | HEART RATE: 79 BPM | BODY MASS INDEX: 27.26 KG/M2 | HEIGHT: 70 IN | DIASTOLIC BLOOD PRESSURE: 72 MMHG

## 2022-12-28 DIAGNOSIS — I42.0 CARDIOMYOPATHY, DILATED, NONISCHEMIC: Primary | ICD-10-CM

## 2022-12-28 DIAGNOSIS — I47.20 VENTRICULAR TACHYCARDIA: ICD-10-CM

## 2022-12-28 DIAGNOSIS — I49.5 SICK SINUS SYNDROME: ICD-10-CM

## 2022-12-28 DIAGNOSIS — Z01.810 PREOPERATIVE CARDIOVASCULAR EXAMINATION: ICD-10-CM

## 2022-12-28 DIAGNOSIS — K40.90 UNILATERAL INGUINAL HERNIA WITHOUT OBSTRUCTION OR GANGRENE, RECURRENCE NOT SPECIFIED: ICD-10-CM

## 2022-12-28 PROCEDURE — 93290 INTERROG DEV EVAL ICPMS IP: CPT | Performed by: INTERNAL MEDICINE

## 2022-12-28 PROCEDURE — 93000 ELECTROCARDIOGRAM COMPLETE: CPT | Performed by: INTERNAL MEDICINE

## 2022-12-28 PROCEDURE — 93283 PRGRMG EVAL IMPLANTABLE DFB: CPT | Performed by: INTERNAL MEDICINE

## 2022-12-28 PROCEDURE — 99214 OFFICE O/P EST MOD 30 MIN: CPT | Performed by: INTERNAL MEDICINE

## 2022-12-28 RX ORDER — SACUBITRIL AND VALSARTAN 24; 26 MG/1; MG/1
TABLET, FILM COATED ORAL
Qty: 180 TABLET | Refills: 1 | Status: SHIPPED | OUTPATIENT
Start: 2022-12-28

## 2022-12-28 NOTE — PROGRESS NOTES
Date of Office Visit: 22  Encounter Provider: Micheal Reagan MD  Place of Service: [facility]  Patient Name: Harvey Leyva  :1940    Chief Complaint   Patient presents with   • Cardiomyopathy   :     HPI:     Mr. Leyva is 82 y.o. and presents today to establish care. I have reviewed prior notes and there are no changes except for any new updates described below. I have also reviewed any information entered into the medical record by the patient or by ancillary staff.     He has a history of nonischemic dilated cardiomyopathy. With time and goal directed medical therapy, his LVEF had improved to 40-45% in 2022. He has a history of VT and NSVT and is s/p implantation of a Medtronic dual chamber ICD in .     He's doing great! He has no cardiac symptoms. His device interrogation today showed two NSVT.     He has an inguinal hernia that has been bothering him.  He wants to know if he would be able to have surgery to repair it.    Past Medical History:   Diagnosis Date   • Basal cell carcinoma    • Dilated cardiomyopathy (HCC)    • Diverticulosis    • Hyperlipidemia    • Hypotension     due to drugs   • ICD (implantable cardioverter-defibrillator) in place     2017, Medtronic dual chamber   • Ventricular tachycardia        Past Surgical History:   Procedure Laterality Date   • ABDOMINAL HERNIA REPAIR     • BACK SURGERY N/A    • CARDIAC DEFIBRILLATOR PLACEMENT N/A 2009    Prophylactic AICD (Medtronic Secura DR model # [id number], serial # [id number])-Dr. Barrett Michael   • CARDIAC ELECTROPHYSIOLOGY PROCEDURE Left 2017    Procedure: Device Replacement   ICD GEN CHG MEDTRONIC;  Surgeon: Guido Michael MD;  Location: [facility] INVASIVE LOCATION;  Service:    • CATARACT EXTRACTION     • COLONOSCOPY N/A 2010    Roel Clancy    • FINE NEEDLE ASPIRATION N/A 2009    Pericardial fluid aspiration-Dr. Willian Quintanilla   • INGUINAL HERNIA  "REPAIR Left    • PROSTATE SURGERY     • VEIN LIGATION AND STRIPPING         Social History     Socioeconomic History   • Marital status:    Tobacco Use   • Smoking status: Former   • Smokeless tobacco: Never   Vaping Use   • Vaping Use: Never used   Substance and Sexual Activity   • Alcohol use: No     Comment: caffeine use   • Drug use: No   • Sexual activity: Defer       Family History   Problem Relation Age of Onset   • Heart failure Mother    • Prostate cancer Father    • Heart disease Brother    • Prostate cancer Brother        Review of Systems   HENT: Positive for hearing loss.    All other systems reviewed and are negative.      No Known Allergies      Current Outpatient Medications:   •  ALPRAZolam (XANAX) 0.5 MG tablet, Take 0.5 mg by mouth 3 (Three) Times a Day., Disp: , Rfl:   •  Entresto 24-26 MG tablet, TAKE 1 TABLET BY MOUTH TWICE A DAY, Disp: 180 tablet, Rfl: 1  •  esomeprazole (nexIUM) 20 MG capsule, Take 20 mg by mouth Every Morning Before Breakfast., Disp: , Rfl:   •  finasteride (PROSCAR) 5 MG tablet, Take 5 mg by mouth Daily., Disp: , Rfl:   •  furosemide (LASIX) 20 MG tablet, TAKE 1 TABLET BY MOUTH TWICE A DAY, Disp: 180 tablet, Rfl: 1  •  magnesium oxide (MAGOX) 400 (241.3 Mg) MG tablet tablet, Take 400 mg by mouth Daily., Disp: , Rfl:   •  metoprolol tartrate (LOPRESSOR) 50 MG tablet, TAKE 1 TABLET BY MOUTH THREE TIMES A DAY, Disp: 270 tablet, Rfl: 1  •  pravastatin (PRAVACHOL) 10 MG tablet, Take 1 tablet by mouth Daily., Disp: , Rfl:   •  spironolactone (ALDACTONE) 25 MG tablet, TAKE 1/2 TABLET BY MOUTH EVERY DAY, Disp: 45 tablet, Rfl: 1  •  tamsulosin (FLOMAX) 0.4 MG capsule 24 hr capsule, Take 0.4 mg by mouth 2 (Two) Times a Day., Disp: , Rfl:       Objective:     Vitals:    12/28/22 0938   BP: 118/72   BP Location: Right arm   Pulse: 79   Weight: 86.4 kg (190 lb 6.4 oz)   Height: 177.8 cm (70\")     Body mass index is 27.32 kg/m².    Vitals reviewed.   Constitutional:       " Appearance: Healthy appearance. Well-developed and not in distress.   Eyes:      Conjunctiva/sclera: Conjunctivae normal.   HENT:      Head: Normocephalic.      Nose: Nose normal.         Comments: masked  Neck:      Vascular: No JVD. JVD normal.      Lymphadenopathy: No cervical adenopathy.   Pulmonary:      Effort: Pulmonary effort is normal.      Breath sounds: Normal breath sounds.   Cardiovascular:      Normal rate. Regular rhythm.      Murmurs: There is no murmur.   Pulses:     Intact distal pulses.   Edema:     Peripheral edema absent.   Abdominal:      Palpations: Abdomen is soft.      Tenderness: There is no abdominal tenderness.   Musculoskeletal: Normal range of motion.      Cervical back: Normal range of motion. Skin:     General: Skin is warm and dry.      Findings: No rash.   Neurological:      General: No focal deficit present.      Mental Status: Alert and oriented to person, place, and time.      Cranial Nerves: No cranial nerve deficit.   Psychiatric:         Behavior: Behavior normal.         Thought Content: Thought content normal.         Judgment: Judgment normal.           ECG 12 Lead    Date/Time: 12/28/2022 10:27 AM  Performed by: Micheal Reagan MD  Authorized by: Micheal Reagan MD   Comparison: compared with previous ECG   Similar to previous ECG  Rhythm: sinus rhythm  Ectopy: unifocal PVCs  Conduction: conduction normal  ST Segments: ST segments normal  T inversion: aVL  T flattening: I  QRS axis: normal  Other: no other findings    Clinical impression: abnormal EKG              Assessment:       Diagnosis Plan   1. Cardiomyopathy, dilated, nonischemic (HCC)        2. Ventricular tachycardia        3. Unilateral inguinal hernia without obstruction or gangrene, recurrence not specified        4. Preoperative cardiovascular examination             Plan:       Mr Leyva has a history of nonischemic dilated CMP; he's euvolemic and asymptomatic (NYHA class 0-1) on sacubitril-valsartan,  furosemide, metoprolol, and spironolactone.  His metoprolol is tartrate, but since he's 81yo and doing well, I don't think there is enough reason to change it to succinate. Hie EF was 40-45% in July 2022.    He has a history of NSVT; he has an ICD but has not required any anti-tachycardia therapies. He will remain on metoprolol.     He has a symptomatic inguinal hernia.  He inquires as to whether or not he would be able to undergo surgery from a cardiac standpoint.  He is at low risk of major adverse cardiovascular events with laparoscopic inguinal hernia repair.  He does not require testing prior to surgery as he just had an echocardiogram this summer.    Sincerely,       Micheal Reagan MD

## 2023-01-19 RX ORDER — SPIRONOLACTONE 25 MG/1
TABLET ORAL
Qty: 45 TABLET | Refills: 1 | Status: SHIPPED | OUTPATIENT
Start: 2023-01-19

## 2023-01-28 PROCEDURE — 93296 REM INTERROG EVL PM/IDS: CPT | Performed by: INTERNAL MEDICINE

## 2023-01-28 PROCEDURE — 93295 DEV INTERROG REMOTE 1/2/MLT: CPT | Performed by: INTERNAL MEDICINE

## 2023-04-11 RX ORDER — FUROSEMIDE 20 MG/1
TABLET ORAL
Qty: 180 TABLET | Refills: 1 | Status: SHIPPED | OUTPATIENT
Start: 2023-04-11

## 2023-04-18 PROCEDURE — 93297 REM INTERROG DEV EVAL ICPMS: CPT | Performed by: INTERNAL MEDICINE

## 2023-04-18 PROCEDURE — G2066 INTER DEVC REMOTE 30D: HCPCS | Performed by: INTERNAL MEDICINE

## 2023-04-29 PROCEDURE — 93296 REM INTERROG EVL PM/IDS: CPT | Performed by: INTERNAL MEDICINE

## 2023-04-29 PROCEDURE — 93295 DEV INTERROG REMOTE 1/2/MLT: CPT | Performed by: INTERNAL MEDICINE

## 2023-07-29 PROCEDURE — 93296 REM INTERROG EVL PM/IDS: CPT | Performed by: INTERNAL MEDICINE

## 2023-07-29 PROCEDURE — 93295 DEV INTERROG REMOTE 1/2/MLT: CPT | Performed by: INTERNAL MEDICINE

## 2023-10-10 ENCOUNTER — OFFICE VISIT (OUTPATIENT)
Dept: SURGERY | Facility: CLINIC | Age: 83
End: 2023-10-10
Payer: MEDICARE

## 2023-10-10 VITALS
BODY MASS INDEX: 26.38 KG/M2 | DIASTOLIC BLOOD PRESSURE: 64 MMHG | SYSTOLIC BLOOD PRESSURE: 118 MMHG | HEIGHT: 71 IN | WEIGHT: 188.4 LBS

## 2023-10-10 DIAGNOSIS — K40.90 NON-RECURRENT UNILATERAL INGUINAL HERNIA WITHOUT OBSTRUCTION OR GANGRENE: Primary | ICD-10-CM

## 2023-10-10 PROCEDURE — 1159F MED LIST DOCD IN RCRD: CPT | Performed by: SURGERY

## 2023-10-10 PROCEDURE — 99204 OFFICE O/P NEW MOD 45 MIN: CPT | Performed by: SURGERY

## 2023-10-10 PROCEDURE — 1160F RVW MEDS BY RX/DR IN RCRD: CPT | Performed by: SURGERY

## 2023-10-10 RX ORDER — OXYCODONE HCL 10 MG/1
10 TABLET, FILM COATED, EXTENDED RELEASE ORAL ONCE
OUTPATIENT
Start: 2023-10-10 | End: 2023-10-10

## 2023-10-10 RX ORDER — ONDANSETRON 2 MG/ML
4 INJECTION INTRAMUSCULAR; INTRAVENOUS EVERY 6 HOURS PRN
OUTPATIENT
Start: 2023-10-10

## 2023-10-10 RX ORDER — SCOLOPAMINE TRANSDERMAL SYSTEM 1 MG/1
1 PATCH, EXTENDED RELEASE TRANSDERMAL CONTINUOUS
OUTPATIENT
Start: 2023-10-10 | End: 2023-10-13

## 2023-10-10 NOTE — LETTER
2023     Micheal Reagan MD  [address]  [facility] [address]    Patient: Harvey Leyva   YOB: 1940   Date of Visit: 10/10/2023       Dear Micheal Reagan MD,    Thank you for referring Harvey Leyva to me for evaluation. Below is a copy of my consult note.    If you have questions, please do not hesitate to call me. I look forward to following Harvey along with you.         Sincerely,        Nitish Wells MD        CC: No Recipients    Date: 2023   Patient Name: Harvey Leyva   Medical Record #: [id number]   : 1940  Age: 83 y.o.  Sex: male      Referring MD:  Dr. Reagan    Reason for Visit  Chief Complaint   Patient presents with    Hernia     Right inguinal, multiple years, causing discomfort for about 8 months, some bulging, size of a small egg        History of Present Illness:     Harvey Leyva is a 83 y.o. male who presents with a mass in his right inguinal region. He is asymptomatic on the left.  The mass is reducible. It does have a history of tenderness.  Heavy physical activity tends to make the pain more severe. The pain lasts for a variable period of time.     The pain is  almost always centered in the area of the bulge, but can occasionally radiate.  The quality can be sharp or burning in nature. The patient has not had obstructive symptoms. The bulge has been slowly growing over time. The pain severity is variable. It occurs on almost a daily basis.  He has history of left inguinal hernia repair with mesh laparoscopically  He was recently seen by her cardiologist and cleared for hernia surgery.    Past Medical History  Patient Active Problem List   Diagnosis    Cardiomyopathy, dilated, nonischemic    Ventricular tachycardia    Hyperlipidemia LDL goal <100    Non-recurrent unilateral inguinal hernia without obstruction or gangrene       Past Surgical History  Past Surgical History:   Procedure Laterality Date    ABDOMINAL HERNIA REPAIR      BACK  SURGERY N/A     CARDIAC DEFIBRILLATOR PLACEMENT N/A 07/02/2009    Prophylactic AICD (Medtronic Secura DR model # M122VQR, serial # [id number])-Dr. Barrett Michael    CARDIAC ELECTROPHYSIOLOGY PROCEDURE Left 2/20/2017    Procedure: Device Replacement   ICD GEN CHG MEDTRONIC;  Surgeon: Guido Michael MD;  Location: [facility] INVASIVE LOCATION;  Service:     CATARACT EXTRACTION      COLONOSCOPY N/A 12/06/2010    Roel Clancy     FINE NEEDLE ASPIRATION N/A 11/29/2009    Pericardial fluid aspiration-Dr. Willian Quintanilla    INGUINAL HERNIA REPAIR Left     PROSTATE SURGERY      VEIN LIGATION AND STRIPPING         Allergies  No Known Allergies    Medications  Current Outpatient Medications   Medication Sig Dispense Refill    ALPRAZolam (XANAX) 0.5 MG tablet Take 1 tablet by mouth 3 (Three) Times a Day.      Entresto 24-26 MG tablet TAKE 1 TABLET BY MOUTH TWICE A  tablet 1    esomeprazole (nexIUM) 20 MG capsule Take 1 capsule by mouth Every Morning Before Breakfast.      finasteride (PROSCAR) 5 MG tablet Take 1 tablet by mouth Daily.      furosemide (LASIX) 20 MG tablet TAKE 1 TABLET BY MOUTH TWICE A  tablet 1    magnesium oxide (MAGOX) 400 (241.3 Mg) MG tablet tablet Take 1 tablet by mouth Daily.      metoprolol tartrate (LOPRESSOR) 50 MG tablet TAKE 1 TABLET BY MOUTH THREE TIMES A  tablet 1    pravastatin (PRAVACHOL) 10 MG tablet Take 1 tablet by mouth Daily.      spironolactone (ALDACTONE) 25 MG tablet TAKE 1/2 TABLET BY MOUTH EVERY DAY 45 tablet 1    tamsulosin (FLOMAX) 0.4 MG capsule 24 hr capsule Take 1 capsule by mouth 2 (Two) Times a Day.       No current facility-administered medications for this visit.       Social History  Social History     Socioeconomic History    Marital status:    Tobacco Use    Smoking status: Former    Smokeless tobacco: Never   Vaping Use    Vaping Use: Never used   Substance and Sexual Activity    Alcohol use: No      "Comment: caffeine use    Drug use: No    Sexual activity: Defer       Family History  Family History   Problem Relation Age of Onset    Heart failure Mother     Prostate cancer Father     Heart disease Brother     Prostate cancer Brother        Review of Systems    CONSTITUTIONAL: Denies fevers, chills, unintentional weight loss or weight gain  RESPIRATORY: Denies chronic cough or sob.   CARDIAC: Denies chest pain, palpitations, edema  GI: Denies dyspepsia, reflux, heartburn, nausea, vomiting, diarrhea or constipation   : Denies dysuria or hematuria.    MUSCULOSKELETAL: Denies muscle weakness and pain   NEURO: Denies chronic headaches.   ENDOCRINE: Denies significant heat or cold intolerance or history of thyroid problems.    DERM: no rashes,lesions or discharge.     Physical Exam  /64 (BP Location: Left arm, Patient Position: Sitting, Cuff Size: Adult)   Ht 180.3 cm (71\")   Wt 85.5 kg (188 lb 6.4 oz)   BMI 26.28 kg/mý   Body mass index is 26.28 kg/mý.  General: Alert, no acute distress  HEENT:  conjunctiva clear, moist mucus membranes  Lungs: clear to auscultation and percussion, no chest deformities noted.  Left chest defibrillator in place.  Cardiovascular:  Regular rate and rhythm  Extremities: Without clubbing cyanosis or edema  Musculoskeletal: Without acute abnormality  Skin:  No rashes or hematomas, warm and moist.  Neuro: Gait normal. Sensation and strength grossly normal.  Abdominal exam: soft, nontender, nondistended, no masses or organomegaly.  Easily reducible and slightly tender right inguinal hernia.  There is no evidence of hernia on the left     Medical Decision Making  Test Results:  Labs 6/8/2023:   White blood cell count 5.8, hemoglobin 15.1, platelets 174  CMP within normal limits    Impression:  This is a 83 y.o. male patient with a chief complaint of a symptomatic and enlarging right inguinal hernia.  Robotic assisted risks and benefits of conservative, laparoscopic and open " approach have been discussed in length and at detail with the patient.  I offered the patient robotic assisted laparoscopic right inguinal hernia repair with mesh placement.  After carefully considering those risks and benefits, being given an opportunity to further ask questions, and voicing understanding,  the patient has chosen to undergo a robotic assisted laparoscopic laparoscopic right inguinal hernia repair with mesh, possible open approach.      Plan:  Surgical scheduling process is initiated.      Orders:   Orders Placed This Encounter   Procedures    Obtain Informed Consent     Standing Status:   Future     Order Specific Question:   Informed Consent Given For     Answer:   Robotic Asissted laparoscopic right inguinal hernia repair with mesh placement    Provide NPO Instructions to Patient     Standing Status:   Future    Chlorhexidine Skin Prep     Chlorhexidine Skin Prep and Instructions For All Patients Having A Procedure Requiring an Outward Incision if Not Allergic. If Allergic, Give Antibacterial Skin Wipes and Instructions. Do Not Use For Facial Cases or on Any Mucus Membranes.     Standing Status:   Future       Nitish Wells MD  General, Minimally Invasive and Endoscopic Surgery  [facility]    [address], [address]  [place], [place], [address]  P: [phone]  F: [phone]

## 2023-10-11 NOTE — PROGRESS NOTES
Date: 2023   Patient Name: Harvey Leyva   Medical Record #: [id number]   : 1940  Age: 83 y.o.  Sex: male      Referring MD:  Dr. Reagan    Reason for Visit  Chief Complaint   Patient presents with    Hernia     Right inguinal, multiple years, causing discomfort for about 8 months, some bulging, size of a small egg        History of Present Illness:     Harvey Leyva is a 83 y.o. male who presents with a mass in his right inguinal region. He is asymptomatic on the left.  The mass is reducible. It does have a history of tenderness.  Heavy physical activity tends to make the pain more severe. The pain lasts for a variable period of time.     The pain is  almost always centered in the area of the bulge, but can occasionally radiate.  The quality can be sharp or burning in nature. The patient has not had obstructive symptoms. The bulge has been slowly growing over time. The pain severity is variable. It occurs on almost a daily basis.  He has history of left inguinal hernia repair with mesh laparoscopically  He was recently seen by her cardiologist and cleared for hernia surgery.    Past Medical History  Patient Active Problem List   Diagnosis    Cardiomyopathy, dilated, nonischemic    Ventricular tachycardia    Hyperlipidemia LDL goal <100    Non-recurrent unilateral inguinal hernia without obstruction or gangrene       Past Surgical History  Past Surgical History:   Procedure Laterality Date    ABDOMINAL HERNIA REPAIR      BACK SURGERY N/A     CARDIAC DEFIBRILLATOR PLACEMENT N/A 2009    Prophylactic AICD (Medtronic Secura DR model # [id number], serial # [id number])-Dr. Barrett Michael    CARDIAC ELECTROPHYSIOLOGY PROCEDURE Left 2017    Procedure: Device Replacement   ICD GEN CHG MEDTRONIC;  Surgeon: Guido Michael MD;  Location: [facility] INVASIVE LOCATION;  Service:     CATARACT EXTRACTION      COLONOSCOPY N/A 2010    Roel Clancy     FINE NEEDLE ASPIRATION N/A 2009     Pericardial fluid aspiration-Dr. Willian Quintanilla    INGUINAL HERNIA REPAIR Left     PROSTATE SURGERY      VEIN LIGATION AND STRIPPING         Allergies  No Known Allergies    Medications  Current Outpatient Medications   Medication Sig Dispense Refill    ALPRAZolam (XANAX) 0.5 MG tablet Take 1 tablet by mouth 3 (Three) Times a Day.      Entresto 24-26 MG tablet TAKE 1 TABLET BY MOUTH TWICE A  tablet 1    esomeprazole (nexIUM) 20 MG capsule Take 1 capsule by mouth Every Morning Before Breakfast.      finasteride (PROSCAR) 5 MG tablet Take 1 tablet by mouth Daily.      furosemide (LASIX) 20 MG tablet TAKE 1 TABLET BY MOUTH TWICE A  tablet 1    magnesium oxide (MAGOX) 400 (241.3 Mg) MG tablet tablet Take 1 tablet by mouth Daily.      metoprolol tartrate (LOPRESSOR) 50 MG tablet TAKE 1 TABLET BY MOUTH THREE TIMES A  tablet 1    pravastatin (PRAVACHOL) 10 MG tablet Take 1 tablet by mouth Daily.      spironolactone (ALDACTONE) 25 MG tablet TAKE 1/2 TABLET BY MOUTH EVERY DAY 45 tablet 1    tamsulosin (FLOMAX) 0.4 MG capsule 24 hr capsule Take 1 capsule by mouth 2 (Two) Times a Day.       No current facility-administered medications for this visit.       Social History  Social History     Socioeconomic History    Marital status:    Tobacco Use    Smoking status: Former    Smokeless tobacco: Never   Vaping Use    Vaping Use: Never used   Substance and Sexual Activity    Alcohol use: No     Comment: caffeine use    Drug use: No    Sexual activity: Defer       Family History  Family History   Problem Relation Age of Onset    Heart failure Mother     Prostate cancer Father     Heart disease Brother     Prostate cancer Brother        Review of Systems    CONSTITUTIONAL: Denies fevers, chills, unintentional weight loss or weight gain  RESPIRATORY: Denies chronic cough or sob.   CARDIAC: Denies chest pain, palpitations, edema  GI: Denies dyspepsia, reflux, heartburn, nausea, vomiting, diarrhea or  "constipation   : Denies dysuria or hematuria.    MUSCULOSKELETAL: Denies muscle weakness and pain   NEURO: Denies chronic headaches.   ENDOCRINE: Denies significant heat or cold intolerance or history of thyroid problems.    DERM: no rashes,lesions or discharge.     Physical Exam  /64 (BP Location: Left arm, Patient Position: Sitting, Cuff Size: Adult)   Ht 180.3 cm (71\")   Wt 85.5 kg (188 lb 6.4 oz)   BMI 26.28 kg/mý   Body mass index is 26.28 kg/mý.  General: Alert, no acute distress  HEENT:  conjunctiva clear, moist mucus membranes  Lungs: clear to auscultation and percussion, no chest deformities noted.  Left chest defibrillator in place.  Cardiovascular:  Regular rate and rhythm  Extremities: Without clubbing cyanosis or edema  Musculoskeletal: Without acute abnormality  Skin:  No rashes or hematomas, warm and moist.  Neuro: Gait normal. Sensation and strength grossly normal.  Abdominal exam: soft, nontender, nondistended, no masses or organomegaly.  Easily reducible and slightly tender right inguinal hernia.  There is no evidence of hernia on the left     Medical Decision Making  Test Results:  Labs 6/8/2023:   White blood cell count 5.8, hemoglobin 15.1, platelets 174  CMP within normal limits    Impression:  This is a 83 y.o. male patient with a chief complaint of a symptomatic and enlarging right inguinal hernia.  Robotic assisted risks and benefits of conservative, laparoscopic and open approach have been discussed in length and at detail with the patient.  I offered the patient robotic assisted laparoscopic right inguinal hernia repair with mesh placement.  After carefully considering those risks and benefits, being given an opportunity to further ask questions, and voicing understanding,  the patient has chosen to undergo a robotic assisted laparoscopic laparoscopic right inguinal hernia repair with mesh, possible open approach.      Plan:  Surgical scheduling process is initiated.  "     Orders:   Orders Placed This Encounter   Procedures    Obtain Informed Consent     Standing Status:   Future     Order Specific Question:   Informed Consent Given For     Answer:   Robotic Asissted laparoscopic right inguinal hernia repair with mesh placement    Provide NPO Instructions to Patient     Standing Status:   Future    Chlorhexidine Skin Prep     Chlorhexidine Skin Prep and Instructions For All Patients Having A Procedure Requiring an Outward Incision if Not Allergic. If Allergic, Give Antibacterial Skin Wipes and Instructions. Do Not Use For Facial Cases or on Any Mucus Membranes.     Standing Status:   Future       Nitish Wells MD  General, Minimally Invasive and Endoscopic Surgery  [facility]    [address] [place] [address], [address]  [place], [place], [address]  P: [phone]  F: [phone]

## 2023-10-19 RX ORDER — FUROSEMIDE 20 MG/1
TABLET ORAL
Qty: 180 TABLET | Refills: 1 | Status: SHIPPED | OUTPATIENT
Start: 2023-10-19

## 2023-11-08 ENCOUNTER — PRE-ADMISSION TESTING (OUTPATIENT)
Dept: PREADMISSION TESTING | Facility: HOSPITAL | Age: 83
End: 2023-11-08
Payer: MEDICARE

## 2023-11-08 VITALS
HEART RATE: 87 BPM | HEIGHT: 71 IN | BODY MASS INDEX: 26.31 KG/M2 | DIASTOLIC BLOOD PRESSURE: 80 MMHG | TEMPERATURE: 97.8 F | SYSTOLIC BLOOD PRESSURE: 128 MMHG | OXYGEN SATURATION: 96 % | WEIGHT: 187.9 LBS | RESPIRATION RATE: 16 BRPM

## 2023-11-08 LAB
ANION GAP SERPL CALCULATED.3IONS-SCNC: 9.7 MMOL/L (ref 5–15)
BUN SERPL-MCNC: 14 MG/DL (ref 8–23)
BUN/CREAT SERPL: 16.5 (ref 7–25)
CALCIUM SPEC-SCNC: 9.5 MG/DL (ref 8.6–10.5)
CHLORIDE SERPL-SCNC: 101 MMOL/L (ref 98–107)
CO2 SERPL-SCNC: 29.3 MMOL/L (ref 22–29)
CREAT SERPL-MCNC: 0.85 MG/DL (ref 0.76–1.27)
DEPRECATED RDW RBC AUTO: 63.2 FL (ref 37–54)
EGFRCR SERPLBLD CKD-EPI 2021: 85.2 ML/MIN/1.73
ERYTHROCYTE [DISTWIDTH] IN BLOOD BY AUTOMATED COUNT: 17.1 % (ref 12.3–15.4)
GLUCOSE SERPL-MCNC: 97 MG/DL (ref 65–99)
HCT VFR BLD AUTO: 45 % (ref 37.5–51)
HGB BLD-MCNC: 15.5 G/DL (ref 13–17.7)
MCH RBC QN AUTO: 35.2 PG (ref 26.6–33)
MCHC RBC AUTO-ENTMCNC: 34.4 G/DL (ref 31.5–35.7)
MCV RBC AUTO: 102.3 FL (ref 79–97)
PLATELET # BLD AUTO: 190 10*3/MM3 (ref 140–450)
PMV BLD AUTO: 9.7 FL (ref 6–12)
POTASSIUM SERPL-SCNC: 4 MMOL/L (ref 3.5–5.2)
RBC # BLD AUTO: 4.4 10*6/MM3 (ref 4.14–5.8)
SODIUM SERPL-SCNC: 140 MMOL/L (ref 136–145)
WBC NRBC COR # BLD: 5.89 10*3/MM3 (ref 3.4–10.8)

## 2023-11-08 PROCEDURE — 36415 COLL VENOUS BLD VENIPUNCTURE: CPT

## 2023-11-08 PROCEDURE — 93010 ELECTROCARDIOGRAM REPORT: CPT | Performed by: INTERNAL MEDICINE

## 2023-11-08 PROCEDURE — 85027 COMPLETE CBC AUTOMATED: CPT

## 2023-11-08 PROCEDURE — 93005 ELECTROCARDIOGRAM TRACING: CPT

## 2023-11-08 PROCEDURE — 80048 BASIC METABOLIC PNL TOTAL CA: CPT

## 2023-11-08 NOTE — DISCHARGE INSTRUCTIONS
Take the following medications the morning of surgery: ALPRAZOLAM, ESOMEPRAZOLE, METOPROLOL    ARRIVE TO [facility] AT 10:00 AM.      If you are on prescription narcotic pain medication to control your pain you may also take that medication the morning of surgery.    General Instructions:  Do not eat solid food after midnight the night before surgery.  You may drink clear liquids day of surgery but must stop at least one hour before your hospital arrival time. CUTOFF TIME IS 9:00 AM.  It is beneficial for you to have a clear drink that contains carbohydrates the day of surgery.  We suggest a 12 to 20 ounce bottle of Gatorade or Powerade for non-diabetic patients or a 12 to 20 ounce bottle of G2 or Powerade Zero for diabetic patients. (Pediatric patients, are not advised to drink a 12 to 20 ounce carbohydrate drink)    Clear liquids are liquids you can see through.  Nothing red in color.     Plain water                               Sports drinks  Sodas                                   Gelatin (Jell-O)  Fruit juices without pulp such as white grape juice and apple juice  Popsicles that contain no fruit or yogurt  Tea or coffee (no cream or milk added)  Gatorade / Powerade  G2 / Powerade Zero    Infants may have breast milk up to four hours before surgery.  Infants drinking formula may drink formula up to six hours before surgery.   Patients who avoid smoking, chewing tobacco and alcohol for 4 weeks prior to surgery have a reduced risk of post-operative complications.  Quit smoking as many days before surgery as you can.  Do not smoke, use chewing tobacco or drink alcohol the day of surgery.   If applicable bring your C-PAP/ BI-PAP machine in with you to preop day of surgery.  Bring any papers given to you in the doctor’s office.  Wear clean comfortable clothes.  Do not wear contact lenses, false eyelashes or make-up.  Bring a case for your glasses.   Bring crutches or walker if applicable.  Remove all piercings.   Leave jewelry and any other valuables at home.  Hair extensions with metal clips must be removed prior to surgery.  The Pre-Admission Testing nurse will instruct you to bring medications if unable to obtain an accurate list in Pre-Admission Testing.        If you were given a blood bank ID arm band remember to bring it with you the day of surgery.    Preventing a Surgical Site Infection:  For 2 to 3 days before surgery, avoid shaving with a razor because the razor can irritate skin and make it easier to develop an infection.    Any areas of open skin can increase the risk of a post-operative wound infection by allowing bacteria to enter and travel throughout the body.  Notify your surgeon if you have any skin wounds / rashes even if it is not near the expected surgical site.  The area will need assessed to determine if surgery should be delayed until it is healed.  The night prior to surgery shower using a fresh bar of anti-bacterial soap (such as Dial) and clean washcloth.  Sleep in a clean bed with clean clothing.  Do not allow pets to sleep with you.    CHLORHEXIDINE CLOTH INSTRUCTIONS  The morning of surgery follow these instructions using the Chlorhexidine cloths you've been given.  These steps reduce bacteria on the body.  Do not use the cloths near your eyes, ears mouth, genitalia or on open wounds.  Throw the cloths away after use but do not try to flush them down a toilet.      Open and remove one cloth at a time from the package.    Leave the cloth unfolded and begin the bathing.  Massage the skin with the cloths using gentle pressure to remove bacteria.  Do not scrub harshly.   Follow the steps below with one 2% CHG cloth per area (6 total cloths).  One cloth for neck, shoulders and chest.  One cloth for both arms, hands, fingers and underarms (do underarms last).  One cloth for the abdomen followed by groin.  One cloth for right leg and foot including between the toes.  One cloth for left leg and foot  including between the toes.  The last cloth is to be used for the back of the neck, back and buttocks.    Allow the CHG to air dry 3 minutes on the skin which will give it time to work and decrease the chance of irritation.  The skin may feel sticky until it is dry.  Do not rinse with water or any other liquid or you will lose the beneficial effects of the CHG.  If mild skin irritation occurs, do rinse the skin to remove the CHG.  Report this to the nurse at time of admission.  Do not apply lotions, creams, ointments, deodorants or perfumes after using the cloths. Dress in clean clothes before coming to the hospital.  Shower on the morning of surgery using a fresh bar of anti-bacterial soap (such as Dial) and clean washcloth.  Dry with a clean towel and dress in clean clothing.    Ask your surgeon if you will be receiving antibiotics prior to surgery.  Make sure you, your family, and all healthcare providers clean their hands with soap and water or an alcohol based hand  before caring for you or your wound.    Day of surgery:  Your arrival time is approximately two hours before your scheduled surgery time.  Upon arrival, a Pre-op nurse and Anesthesiologist will review your health history, obtain vital signs, and answer questions you may have.  The only belongings needed at this time will be a list of your home medications and if applicable your C-PAP/BI-PAP machine.  A Pre-op nurse will start an IV and you may receive medication in preparation for surgery, including something to help you relax.     Please be aware that surgery does come with discomfort.  We want to make every effort to control your discomfort so please discuss any uncontrolled symptoms with your nurse.   Your doctor will most likely have prescribed pain medications.      If you are going home after surgery you will receive individualized written care instructions before being discharged.  A responsible adult must drive you to and from the  hospital on the day of your surgery and stay with you for 24 hours.  Discharge prescriptions can be filled by the hospital pharmacy during regular pharmacy hours.  If you are having surgery late in the day/evening your prescription may be e-prescribed to your pharmacy.  Please verify your pharmacy hours or chose a 24 hour pharmacy to avoid not having access to your prescription because your pharmacy has closed for the day.    If you are staying overnight following surgery, you will be transported to your hospital room following the recovery period.  [facility].    If you have any questions please call Pre-Admission Testing at [phone].  Deductibles and co-payments are collected on the day of service. Please be prepared to pay the required co-pay, deductible or deposit on the day of service as defined by your plan.    Call your surgeon immediately if you experience any of the following symptoms:  Sore Throat  Shortness of Breath or difficulty breathing  Cough  Chills  Body soreness or muscle pain  Headache  Fever  New loss of taste or smell  Do not arrive for your surgery ill.  Your procedure will need to be rescheduled to another time.  You will need to call your physician before the day of surgery to avoid any unnecessary exposure to hospital staff as well as other patients.

## 2023-11-09 LAB
QT INTERVAL: 425 MS
QTC INTERVAL: 462 MS

## 2023-11-13 ENCOUNTER — TELEPHONE (OUTPATIENT)
Dept: SURGERY | Facility: CLINIC | Age: 83
End: 2023-11-13
Payer: MEDICARE

## 2024-01-03 ENCOUNTER — OFFICE VISIT (OUTPATIENT)
Dept: CARDIOLOGY | Facility: CLINIC | Age: 84
End: 2024-01-03
Payer: MEDICARE

## 2024-01-03 ENCOUNTER — CLINICAL SUPPORT NO REQUIREMENTS (OUTPATIENT)
Age: 84
End: 2024-01-03
Payer: MEDICARE

## 2024-01-03 VITALS
HEIGHT: 71 IN | BODY MASS INDEX: 26.6 KG/M2 | SYSTOLIC BLOOD PRESSURE: 110 MMHG | DIASTOLIC BLOOD PRESSURE: 60 MMHG | HEART RATE: 82 BPM | WEIGHT: 190 LBS

## 2024-01-03 DIAGNOSIS — Z95.810 ICD (IMPLANTABLE CARDIOVERTER-DEFIBRILLATOR) IN PLACE: ICD-10-CM

## 2024-01-03 DIAGNOSIS — I42.0 CARDIOMYOPATHY, DILATED, NONISCHEMIC: Primary | ICD-10-CM

## 2024-01-03 DIAGNOSIS — R42 LIGHTHEADEDNESS: ICD-10-CM

## 2024-01-03 DIAGNOSIS — E78.5 HYPERLIPIDEMIA LDL GOAL <100: ICD-10-CM

## 2024-01-03 DIAGNOSIS — I47.20 VENTRICULAR TACHYCARDIA: ICD-10-CM

## 2024-01-03 DIAGNOSIS — Z01.810 ENCOUNTER FOR PRE-OPERATIVE CARDIOVASCULAR CLEARANCE: ICD-10-CM

## 2024-01-03 DIAGNOSIS — I49.3 PVCS (PREMATURE VENTRICULAR CONTRACTIONS): ICD-10-CM

## 2024-01-03 RX ORDER — SACUBITRIL AND VALSARTAN 24; 26 MG/1; MG/1
1 TABLET, FILM COATED ORAL 2 TIMES DAILY
Qty: 180 TABLET | Refills: 3 | Status: SHIPPED | OUTPATIENT
Start: 2024-01-03

## 2024-01-03 RX ORDER — SPIRONOLACTONE 25 MG/1
12.5 TABLET ORAL DAILY
Qty: 45 TABLET | Refills: 3 | Status: SHIPPED | OUTPATIENT
Start: 2024-01-03

## 2024-01-03 RX ORDER — FUROSEMIDE 20 MG/1
20 TABLET ORAL 2 TIMES DAILY
Qty: 180 TABLET | Refills: 3 | Status: SHIPPED | OUTPATIENT
Start: 2024-01-03

## 2024-01-03 RX ORDER — CARBOXYMETHYLCELLULOSE SODIUM 5 MG/ML
SOLUTION/ DROPS OPHTHALMIC 4 TIMES DAILY
COMMUNITY

## 2024-01-03 RX ORDER — METOPROLOL TARTRATE 50 MG/1
50 TABLET, FILM COATED ORAL 3 TIMES DAILY
Qty: 270 TABLET | Refills: 3 | Status: SHIPPED | OUTPATIENT
Start: 2024-01-03

## 2024-01-03 NOTE — PROGRESS NOTES
Date of Office Visit: 2024  Encounter Provider: ALYSON Cooper  Place of Service: [facility] CARDI[facility]  Patient Name: Harvey Leyva  :1940  Primary Cardiologist: Dr. Micheal Reagan     Chief Complaint   Patient presents with    Cardiomyopathy    Annual Exam   :     HPI: Harvey Leyva is a 83 y.o. male who presents today for annual cardiac follow-up visit.  He is a new patient to me and I have reviewed his medical records.    He has been diagnosed with hypertension and hyperlipidemia.      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  He has been diagnosed with PVCs and nonsustained ventricular tachycardia on ICD checks.    In 2017, permanent dual-chamber ICD replacement.  With time and goal-directed medical therapy, LVEF improved to 41-45% per echocardiogram in 2022.    He is a previous patient of Dr. Hector Michael.  In 2022, he established care with Dr. Micheal Reagan and was doing well at that time.    He presents today for a follow-up appointment with his wife accompanying him.  He just had his ICD annual check which showed 2.6 years to XAVI, occasional PVCs, atrial pacing 78%, and no other arrhythmias.  He occasionally experiences palpitations.  He had a single PVC noted on today's EKG.  He experiences some lightheadedness with low blood pressures.  He denies chest pain, shortness of breath, PND, orthopnea, edema, syncope, or bleeding. He reports problems with urination and prostate issues and is working with urology.  He did not have his inguinal hernia repair.      Past Medical History:   Diagnosis Date    Basal cell carcinoma     Dilated cardiomyopathy     Diverticulosis     Hernia, inguinal     right    History of prostate disorder     Hyperlipidemia     Hypertension     Hypotension     due to drugs    ICD (implantable cardioverter-defibrillator) in place     2017, Medtronic dual chamber    PVCs  (premature ventricular contractions)     Slow to wake up after anesthesia     with left inguinal hernia    Ventricular tachycardia        Past Surgical History:   Procedure Laterality Date    BACK SURGERY N/A 1986    CARDIAC DEFIBRILLATOR PLACEMENT N/A 07/02/2009    Prophylactic AICD (Medtronic [address] DR model # M120YVF, serial # [id number])-Dr. Barrett Michael    CARDIAC ELECTROPHYSIOLOGY PROCEDURE Left 02/20/2017    Procedure: Device Replacement   ICD GEN CHG MEDTRONIC;  Surgeon: Guido Michael MD;  Location: [facility] INVASIVE LOCATION;  Service:     CATARACT EXTRACTION Bilateral     COLONOSCOPY N/A 12/06/2010    Roel Clancy     FINE NEEDLE ASPIRATION N/A 11/29/2009    Pericardial fluid aspiration-Dr. Willian Quintanilla    INGUINAL HERNIA REPAIR Left 1984    laparoscopic    PROSTATE SURGERY      VEIN LIGATION AND STRIPPING         Social History     Socioeconomic History    Marital status:    Tobacco Use    Smoking status: Former    Smokeless tobacco: Never   Vaping Use    Vaping Use: Never used   Substance and Sexual Activity    Alcohol use: No     Comment: caffeine use    Drug use: No    Sexual activity: Defer       Family History   Problem Relation Age of Onset    Heart failure Mother     Prostate cancer Father     Heart disease Brother     Prostate cancer Brother     Malig Hyperthermia Neg Hx        The following portion of the patient's history were reviewed and updated as appropriate: past medical history, past surgical history, past social history, past family history, allergies, current medications, and problem list.    Review of Systems   Constitutional: Negative.   Cardiovascular:  Positive for palpitations.   Respiratory: Negative.     Hematologic/Lymphatic: Negative.    Neurological:  Positive for light-headedness.       No Known Allergies      Current Outpatient Medications:     ALPRAZolam (XANAX) 0.5 MG tablet, Take 1 tablet by mouth 3 (Three) Times a Day., Disp: , Rfl:      "carboxymethylcellulose (REFRESH PLUS) 0.5 % solution, Apply  to eye(s) as directed by provider 4 (Four) Times a Day., Disp: , Rfl:     esomeprazole (nexIUM) 20 MG capsule, Take 1 capsule by mouth Every Morning Before Breakfast., Disp: , Rfl:     finasteride (PROSCAR) 5 MG tablet, Take 1 tablet by mouth Daily., Disp: , Rfl:     furosemide (LASIX) 20 MG tablet, Take 1 tablet by mouth 2 (Two) Times a Day., Disp: 180 tablet, Rfl: 3    magnesium oxide (MAGOX) 400 (241.3 Mg) MG tablet tablet, Take 1 tablet by mouth Daily., Disp: , Rfl:     metoprolol tartrate (LOPRESSOR) 50 MG tablet, Take 1 tablet by mouth 3 (Three) Times a Day., Disp: 270 tablet, Rfl: 3    pravastatin (PRAVACHOL) 10 MG tablet, Take 1 tablet by mouth Daily., Disp: , Rfl:     sacubitril-valsartan (Entresto) 24-26 MG tablet, Take 1 tablet by mouth 2 (Two) Times a Day., Disp: 180 tablet, Rfl: 3    spironolactone (ALDACTONE) 25 MG tablet, Take 0.5 tablets by mouth Daily., Disp: 45 tablet, Rfl: 3    tamsulosin (FLOMAX) 0.4 MG capsule 24 hr capsule, Take 1 capsule by mouth 2 (Two) Times a Day., Disp: , Rfl:          Objective:     Vitals:    01/03/24 1403   BP: 110/60   BP Location: Left arm   Patient Position: Sitting   Cuff Size: Adult   Pulse: 82   Weight: 86.2 kg (190 lb)   Height: 179.1 cm (70.51\")     Body mass index is 26.87 kg/m².    PHYSICAL EXAM:    Vitals Reviewed.   General Appearance: No acute distress, well developed and well nourished.   HENT: No hearing loss noted.    Respiratory: No signs of respiratory distress. Respiration rhythm and depth normal.  Clear to auscultation.   Cardiovascular:  Jugular Venous Pressure: Normal  Heart Rate and Rhythm: Normal, Heart Sounds: Normal S1 and S2. No S3 or S4 noted.  Murmurs: No murmurs noted. No rubs, thrills, or gallops.   Lower Extremities: No edema noted.  Musculoskeletal: Normal movement of extremities.  Skin: General appearance normal.    Psychiatric: Patient alert and oriented to person, place, and " time. Speech and behavior appropriate. Normal mood and affect.       ECG 12 Lead    Date/Time: 1/3/2024 2:07 PM  Performed by: Greta Ibrahim APRN    Authorized by: Greta Ibrahim APRN  Comparison: compared with previous ECG from 11/8/2023  Comparison to previous ECG: NSR  Rhythm: sinus rhythm and paced  Ectopy: unifocal PVCs  Rate: normal  BPM: 82  Conduction: conduction normal  ST Segments: ST segments normal  T Waves: T waves normal  QRS axis: normal  Pacing: atrial sensed rhythm  Clinical impression: abnormal EKG            Assessment:       Diagnosis Plan   1. Cardiomyopathy, dilated, nonischemic        2. ICD (implantable cardioverter-defibrillator) in place        3. Ventricular tachycardia        4. PVCs (premature ventricular contractions)        5. Hyperlipidemia LDL goal <100        6. Lightheadedness        7. Encounter for pre-operative cardiovascular clearance               Plan:       1.  Nonischemic Cardiomyopathy: At 1 point, LVEF was 30%.  LVEF improved to 41-45% per echocardiogram in July 2022 with time and goal-directed medical therapy.  He underwent ICD placement.  Dr. Reagan said it is fine for him to continue with Toprol tartrate has done well with that.  Continue sacubitril-valsartan, spironolactone, and furosemide.    2.  Status post ICD.  ICD check today was good.    3.  Nonsustained ventricular tachycardia noted on ICD checks.  Continue metoprolol.  Occasionally experiences a brief fast heartbeat.  He does not want to increase the metoprolol.    4.  Single PVC noted on today's EKG.  Asymptomatic.    5.  Hyperlipidemia: Remains on pravastatin.    6.  Lightheadedness when blood pressure is too low.  I offered changing his medications, but he wants us to continue to keep them the same.  I recommended good hydration and eating a salty snack/protein if his blood pressure is too low.    7.  He is inquiring his perioperative cardiac risk for possible prostate surgery.  He would be considered   at low risk of adverse cardiac complications with moderate risk surgery.  Dr. Reagan recently saw him for an cranial hernia surgical evaluation and felt that he was at low risk of major adverse cardiovascular events with that procedure.  He would not require any testing prior to surgery at this time.    8.  He will follow-up with Dr. Reagan in 1 year and in office ICD check.      As always, it has been a pleasure to participate in your patient's care. Thank you.         Sincerely,         ALYSON Souza  [facility]      Dictated utilizing Dragon Dictation  I spent  35 minutes reviewing her medical records/testing/previous office notes/labs, face-to-face interaction with patient, physical examination, formulating the plan of care, and discussion of plan of care with patient.

## 2024-12-26 RX ORDER — METOPROLOL TARTRATE 50 MG
50 TABLET ORAL 3 TIMES DAILY
Qty: 270 TABLET | Refills: 0 | Status: SHIPPED | OUTPATIENT
Start: 2024-12-26

## 2025-01-01 RX ORDER — SPIRONOLACTONE 25 MG/1
TABLET ORAL
Qty: 45 TABLET | Refills: 0 | Status: SHIPPED | OUTPATIENT
Start: 2025-01-01

## 2025-02-10 RX ORDER — FUROSEMIDE 20 MG/1
20 TABLET ORAL 2 TIMES DAILY
Qty: 180 TABLET | Refills: 0 | Status: SHIPPED | OUTPATIENT
Start: 2025-02-10

## 2025-02-10 RX ORDER — SACUBITRIL AND VALSARTAN 24; 26 MG/1; MG/1
1 TABLET, FILM COATED ORAL 2 TIMES DAILY
Qty: 180 TABLET | Refills: 0 | Status: SHIPPED | OUTPATIENT
Start: 2025-02-10

## 2025-03-01 ENCOUNTER — ANESTHESIA EVENT (OUTPATIENT)
Dept: PERIOP | Facility: HOSPITAL | Age: 85
End: 2025-03-01
Payer: MEDICARE

## 2025-03-01 ENCOUNTER — ANESTHESIA (OUTPATIENT)
Dept: PERIOP | Facility: HOSPITAL | Age: 85
End: 2025-03-01
Payer: MEDICARE

## 2025-03-01 ENCOUNTER — APPOINTMENT (OUTPATIENT)
Dept: CT IMAGING | Facility: HOSPITAL | Age: 85
End: 2025-03-01
Payer: MEDICARE

## 2025-03-01 ENCOUNTER — HOSPITAL ENCOUNTER (INPATIENT)
Facility: HOSPITAL | Age: 85
LOS: 1 days | Discharge: HOME | End: 2025-03-04
Attending: STUDENT IN AN ORGANIZED HEALTH CARE EDUCATION/TRAINING PROGRAM | Admitting: HOSPITALIST
Payer: MEDICARE

## 2025-03-01 DIAGNOSIS — K56.600 PARTIAL SMALL BOWEL OBSTRUCTION: ICD-10-CM

## 2025-03-01 DIAGNOSIS — K40.30 INCARCERATED INGUINAL HERNIA: Primary | ICD-10-CM

## 2025-03-01 PROBLEM — K46.0 INCARCERATED HERNIA: Status: ACTIVE | Noted: 2025-03-01

## 2025-03-01 PROBLEM — K46.0 INCARCERATED HERNIA: Status: RESOLVED | Noted: 2025-03-01 | Resolved: 2025-03-01

## 2025-03-01 LAB
ALBUMIN SERPL-MCNC: 4.2 G/DL (ref 3.5–5.2)
ALBUMIN/GLOB SERPL: 1.7 G/DL
ALP SERPL-CCNC: 63 U/L (ref 39–117)
ALT SERPL W P-5'-P-CCNC: 18 U/L (ref 1–41)
ANION GAP SERPL CALCULATED.3IONS-SCNC: 12 MMOL/L (ref 5–15)
ANISOCYTOSIS BLD QL: ABNORMAL
AST SERPL-CCNC: 23 U/L (ref 1–40)
BASOPHILS # BLD MANUAL: 0 10*3/MM3 (ref 0–0.2)
BASOPHILS NFR BLD MANUAL: 0 % (ref 0–1.5)
BILIRUB SERPL-MCNC: 0.5 MG/DL (ref 0–1.2)
BILIRUB UR QL STRIP: NEGATIVE
BUN SERPL-MCNC: 16 MG/DL (ref 8–23)
BUN/CREAT SERPL: 18 (ref 7–25)
CALCIUM SPEC-SCNC: 9.3 MG/DL (ref 8.6–10.5)
CHLORIDE SERPL-SCNC: 104 MMOL/L (ref 98–107)
CLARITY UR: CLEAR
CO2 SERPL-SCNC: 25 MMOL/L (ref 22–29)
COLOR UR: YELLOW
CREAT SERPL-MCNC: 0.89 MG/DL (ref 0.76–1.27)
DEPRECATED RDW RBC AUTO: 71.7 FL (ref 37–54)
EGFRCR SERPLBLD CKD-EPI 2021: 83.5 ML/MIN/1.73
EOSINOPHIL # BLD MANUAL: 0 10*3/MM3 (ref 0–0.4)
EOSINOPHIL NFR BLD MANUAL: 0 % (ref 0.3–6.2)
ERYTHROCYTE [DISTWIDTH] IN BLOOD BY AUTOMATED COUNT: 18.1 % (ref 12.3–15.4)
GLOBULIN UR ELPH-MCNC: 2.5 GM/DL
GLUCOSE SERPL-MCNC: 142 MG/DL (ref 65–99)
GLUCOSE UR STRIP-MCNC: NEGATIVE MG/DL
HCT VFR BLD AUTO: 47.7 % (ref 37.5–51)
HGB BLD-MCNC: 16.2 G/DL (ref 13–17.7)
HGB UR QL STRIP.AUTO: NEGATIVE
KETONES UR QL STRIP: ABNORMAL
LEUKOCYTE ESTERASE UR QL STRIP.AUTO: NEGATIVE
LYMPHOCYTES # BLD MANUAL: 1.28 10*3/MM3 (ref 0.7–3.1)
LYMPHOCYTES NFR BLD MANUAL: 9.2 % (ref 5–12)
MACROCYTES BLD QL SMEAR: ABNORMAL
MCH RBC QN AUTO: 36.8 PG (ref 26.6–33)
MCHC RBC AUTO-ENTMCNC: 34 G/DL (ref 31.5–35.7)
MCV RBC AUTO: 108.4 FL (ref 79–97)
MONOCYTES # BLD: 0.89 10*3/MM3 (ref 0.1–0.9)
NEUTROPHILS # BLD AUTO: 7.5 10*3/MM3 (ref 1.7–7)
NEUTROPHILS NFR BLD MANUAL: 77.6 % (ref 42.7–76)
NITRITE UR QL STRIP: NEGATIVE
NRBC BLD AUTO-RTO: 0 /100 WBC (ref 0–0.2)
NRBC SPEC MANUAL: 1 /100 WBC (ref 0–0.2)
PH UR STRIP.AUTO: 6 [PH] (ref 5–8)
PLAT MORPH BLD: NORMAL
PLATELET # BLD AUTO: 163 10*3/MM3 (ref 140–450)
PMV BLD AUTO: 9.9 FL (ref 6–12)
POTASSIUM SERPL-SCNC: 4 MMOL/L (ref 3.5–5.2)
PROT SERPL-MCNC: 6.7 G/DL (ref 6–8.5)
PROT UR QL STRIP: NEGATIVE
RBC # BLD AUTO: 4.4 10*6/MM3 (ref 4.14–5.8)
SODIUM SERPL-SCNC: 141 MMOL/L (ref 136–145)
SP GR UR STRIP: >1.03 (ref 1–1.03)
UROBILINOGEN UR QL STRIP: ABNORMAL
VARIANT LYMPHS NFR BLD MANUAL: 13.3 % (ref 19.6–45.3)
WBC MORPH BLD: NORMAL
WBC NRBC COR # BLD AUTO: 9.66 10*3/MM3 (ref 3.4–10.8)

## 2025-03-01 PROCEDURE — G0378 HOSPITAL OBSERVATION PER HR: HCPCS

## 2025-03-01 PROCEDURE — 25810000003 LACTATED RINGERS PER 1000 ML: Performed by: ANESTHESIOLOGY

## 2025-03-01 PROCEDURE — 25010000002 ONDANSETRON PER 1 MG: Performed by: STUDENT IN AN ORGANIZED HEALTH CARE EDUCATION/TRAINING PROGRAM

## 2025-03-01 PROCEDURE — 25010000002 MIDAZOLAM PER 1 MG: Performed by: ANESTHESIOLOGY

## 2025-03-01 PROCEDURE — 25510000001 IOPAMIDOL 61 % SOLUTION: Performed by: STUDENT IN AN ORGANIZED HEALTH CARE EDUCATION/TRAINING PROGRAM

## 2025-03-01 PROCEDURE — 25010000002 MORPHINE PER 10 MG: Performed by: STUDENT IN AN ORGANIZED HEALTH CARE EDUCATION/TRAINING PROGRAM

## 2025-03-01 PROCEDURE — 85007 BL SMEAR W/DIFF WBC COUNT: CPT | Performed by: PHYSICIAN ASSISTANT

## 2025-03-01 PROCEDURE — 99215 OFFICE O/P EST HI 40 MIN: CPT | Performed by: SURGERY

## 2025-03-01 PROCEDURE — 80053 COMPREHEN METABOLIC PANEL: CPT | Performed by: PHYSICIAN ASSISTANT

## 2025-03-01 PROCEDURE — 85025 COMPLETE CBC W/AUTO DIFF WBC: CPT | Performed by: PHYSICIAN ASSISTANT

## 2025-03-01 PROCEDURE — 25010000002 HYDROMORPHONE 1 MG/ML SOLUTION: Performed by: STUDENT IN AN ORGANIZED HEALTH CARE EDUCATION/TRAINING PROGRAM

## 2025-03-01 PROCEDURE — 99285 EMERGENCY DEPT VISIT HI MDM: CPT

## 2025-03-01 PROCEDURE — 74177 CT ABD & PELVIS W/CONTRAST: CPT

## 2025-03-01 PROCEDURE — 81003 URINALYSIS AUTO W/O SCOPE: CPT | Performed by: PHYSICIAN ASSISTANT

## 2025-03-01 DEVICE — DEV WND/CLS CONTRL TISS STRATAFIX SPIRAL MNCRYL SH 2/0 20CM: Type: IMPLANTABLE DEVICE | Site: INGUINAL | Status: FUNCTIONAL

## 2025-03-01 DEVICE — 3DMAX MID ANATOMICAL MESH, 10 CM X 16 CM (4" X 6"), LARGE, RIGHT
Type: IMPLANTABLE DEVICE | Site: INGUINAL | Status: FUNCTIONAL
Brand: 3DMAX

## 2025-03-01 RX ORDER — FENTANYL CITRATE 50 UG/ML
50 INJECTION, SOLUTION INTRAMUSCULAR; INTRAVENOUS ONCE AS NEEDED
Status: DISCONTINUED | OUTPATIENT
Start: 2025-03-01 | End: 2025-03-02 | Stop reason: HOSPADM

## 2025-03-01 RX ORDER — SODIUM CHLORIDE, SODIUM LACTATE, POTASSIUM CHLORIDE, CALCIUM CHLORIDE 600; 310; 30; 20 MG/100ML; MG/100ML; MG/100ML; MG/100ML
9 INJECTION, SOLUTION INTRAVENOUS CONTINUOUS
Status: DISCONTINUED | OUTPATIENT
Start: 2025-03-01 | End: 2025-03-02

## 2025-03-01 RX ORDER — SODIUM CHLORIDE 0.9 % (FLUSH) 0.9 %
10 SYRINGE (ML) INJECTION AS NEEDED
Status: DISCONTINUED | OUTPATIENT
Start: 2025-03-01 | End: 2025-03-04 | Stop reason: HOSPADM

## 2025-03-01 RX ORDER — IOPAMIDOL 612 MG/ML
100 INJECTION, SOLUTION INTRAVASCULAR
Status: COMPLETED | OUTPATIENT
Start: 2025-03-01 | End: 2025-03-01

## 2025-03-01 RX ORDER — MIDAZOLAM HYDROCHLORIDE 1 MG/ML
0.5 INJECTION, SOLUTION INTRAMUSCULAR; INTRAVENOUS
Status: DISCONTINUED | OUTPATIENT
Start: 2025-03-01 | End: 2025-03-02 | Stop reason: HOSPADM

## 2025-03-01 RX ORDER — LIDOCAINE HYDROCHLORIDE 10 MG/ML
0.5 INJECTION, SOLUTION INFILTRATION; PERINEURAL ONCE AS NEEDED
Status: DISCONTINUED | OUTPATIENT
Start: 2025-03-01 | End: 2025-03-02 | Stop reason: HOSPADM

## 2025-03-01 RX ORDER — SODIUM CHLORIDE 0.9 % (FLUSH) 0.9 %
3-10 SYRINGE (ML) INJECTION AS NEEDED
Status: DISCONTINUED | OUTPATIENT
Start: 2025-03-01 | End: 2025-03-02 | Stop reason: HOSPADM

## 2025-03-01 RX ORDER — SODIUM CHLORIDE 0.9 % (FLUSH) 0.9 %
3 SYRINGE (ML) INJECTION EVERY 12 HOURS SCHEDULED
Status: DISCONTINUED | OUTPATIENT
Start: 2025-03-01 | End: 2025-03-02 | Stop reason: HOSPADM

## 2025-03-01 RX ORDER — MORPHINE SULFATE 2 MG/ML
4 INJECTION, SOLUTION INTRAMUSCULAR; INTRAVENOUS ONCE
Status: COMPLETED | OUTPATIENT
Start: 2025-03-01 | End: 2025-03-01

## 2025-03-01 RX ORDER — FAMOTIDINE 10 MG/ML
20 INJECTION, SOLUTION INTRAVENOUS ONCE
Status: COMPLETED | OUTPATIENT
Start: 2025-03-01 | End: 2025-03-01

## 2025-03-01 RX ORDER — ONDANSETRON 2 MG/ML
4 INJECTION INTRAMUSCULAR; INTRAVENOUS ONCE
Status: COMPLETED | OUTPATIENT
Start: 2025-03-01 | End: 2025-03-01

## 2025-03-01 RX ADMIN — MORPHINE SULFATE 4 MG: 2 INJECTION, SOLUTION INTRAMUSCULAR; INTRAVENOUS at 20:29

## 2025-03-01 RX ADMIN — Medication 3 ML: at 23:28

## 2025-03-01 RX ADMIN — MIDAZOLAM 0.5 MG: 1 INJECTION INTRAMUSCULAR; INTRAVENOUS at 23:42

## 2025-03-01 RX ADMIN — IOPAMIDOL 85 ML: 612 INJECTION, SOLUTION INTRAVENOUS at 20:46

## 2025-03-01 RX ADMIN — FAMOTIDINE 20 MG: 10 INJECTION INTRAVENOUS at 23:34

## 2025-03-01 RX ADMIN — SODIUM CHLORIDE, SODIUM LACTATE, POTASSIUM CHLORIDE, AND CALCIUM CHLORIDE 9 ML/HR: 600; 310; 30; 20 INJECTION, SOLUTION INTRAVENOUS at 23:28

## 2025-03-01 RX ADMIN — HYDROMORPHONE HYDROCHLORIDE 1 MG: 1 INJECTION, SOLUTION INTRAMUSCULAR; INTRAVENOUS; SUBCUTANEOUS at 22:29

## 2025-03-01 RX ADMIN — ONDANSETRON 4 MG: 2 INJECTION, SOLUTION INTRAMUSCULAR; INTRAVENOUS at 20:29

## 2025-03-02 PROCEDURE — C1781 MESH (IMPLANTABLE): HCPCS | Performed by: SURGERY

## 2025-03-02 PROCEDURE — 25010000002 PROPOFOL 10 MG/ML EMULSION: Performed by: NURSE ANESTHETIST, CERTIFIED REGISTERED

## 2025-03-02 PROCEDURE — 25010000002 DEXAMETHASONE SODIUM PHOSPHATE 20 MG/5ML SOLUTION: Performed by: NURSE ANESTHETIST, CERTIFIED REGISTERED

## 2025-03-02 PROCEDURE — G0378 HOSPITAL OBSERVATION PER HR: HCPCS

## 2025-03-02 PROCEDURE — 49650 LAP ING HERNIA REPAIR INIT: CPT | Performed by: SURGERY

## 2025-03-02 PROCEDURE — 25010000002 FENTANYL CITRATE (PF) 50 MCG/ML SOLUTION: Performed by: NURSE ANESTHETIST, CERTIFIED REGISTERED

## 2025-03-02 PROCEDURE — 25010000002 LIDOCAINE 2% SOLUTION: Performed by: NURSE ANESTHETIST, CERTIFIED REGISTERED

## 2025-03-02 PROCEDURE — 0YU50JZ SUPPLEMENT RIGHT INGUINAL REGION WITH SYNTHETIC SUBSTITUTE, OPEN APPROACH: ICD-10-PCS | Performed by: SURGERY

## 2025-03-02 PROCEDURE — 25010000002 HYDROMORPHONE PER 4 MG: Performed by: NURSE ANESTHETIST, CERTIFIED REGISTERED

## 2025-03-02 PROCEDURE — 8E0W4CZ ROBOTIC ASSISTED PROCEDURE OF TRUNK REGION, PERCUTANEOUS ENDOSCOPIC APPROACH: ICD-10-PCS | Performed by: SURGERY

## 2025-03-02 PROCEDURE — 25010000002 SUGAMMADEX 200 MG/2ML SOLUTION: Performed by: NURSE ANESTHETIST, CERTIFIED REGISTERED

## 2025-03-02 PROCEDURE — 99024 POSTOP FOLLOW-UP VISIT: CPT | Performed by: SURGERY

## 2025-03-02 PROCEDURE — 25010000002 CEFAZOLIN PER 500 MG: Performed by: SURGERY

## 2025-03-02 RX ORDER — FENTANYL CITRATE 50 UG/ML
25 INJECTION, SOLUTION INTRAMUSCULAR; INTRAVENOUS
Status: DISCONTINUED | OUTPATIENT
Start: 2025-03-02 | End: 2025-03-02 | Stop reason: HOSPADM

## 2025-03-02 RX ORDER — DEXAMETHASONE SODIUM PHOSPHATE 4 MG/ML
INJECTION, SOLUTION INTRA-ARTICULAR; INTRALESIONAL; INTRAMUSCULAR; INTRAVENOUS; SOFT TISSUE AS NEEDED
Status: DISCONTINUED | OUTPATIENT
Start: 2025-03-02 | End: 2025-03-02 | Stop reason: SURG

## 2025-03-02 RX ORDER — OXYCODONE HYDROCHLORIDE 5 MG/1
5 TABLET ORAL EVERY 4 HOURS PRN
Status: DISCONTINUED | OUTPATIENT
Start: 2025-03-02 | End: 2025-03-04 | Stop reason: HOSPADM

## 2025-03-02 RX ORDER — ONDANSETRON 2 MG/ML
4 INJECTION INTRAMUSCULAR; INTRAVENOUS ONCE AS NEEDED
Status: DISCONTINUED | OUTPATIENT
Start: 2025-03-02 | End: 2025-03-02 | Stop reason: HOSPADM

## 2025-03-02 RX ORDER — LABETALOL HYDROCHLORIDE 5 MG/ML
5 INJECTION, SOLUTION INTRAVENOUS
Status: DISCONTINUED | OUTPATIENT
Start: 2025-03-02 | End: 2025-03-02 | Stop reason: HOSPADM

## 2025-03-02 RX ORDER — ONDANSETRON 2 MG/ML
4 INJECTION INTRAMUSCULAR; INTRAVENOUS ONCE
Status: DISCONTINUED | OUTPATIENT
Start: 2025-03-02 | End: 2025-03-02

## 2025-03-02 RX ORDER — IPRATROPIUM BROMIDE AND ALBUTEROL SULFATE 2.5; .5 MG/3ML; MG/3ML
3 SOLUTION RESPIRATORY (INHALATION) ONCE AS NEEDED
Status: DISCONTINUED | OUTPATIENT
Start: 2025-03-02 | End: 2025-03-02 | Stop reason: HOSPADM

## 2025-03-02 RX ORDER — LIDOCAINE HYDROCHLORIDE 20 MG/ML
INJECTION, SOLUTION INFILTRATION; PERINEURAL AS NEEDED
Status: DISCONTINUED | OUTPATIENT
Start: 2025-03-02 | End: 2025-03-02 | Stop reason: SURG

## 2025-03-02 RX ORDER — SACUBITRIL AND VALSARTAN 24; 26 MG/1; MG/1
1 TABLET, FILM COATED ORAL 2 TIMES DAILY
Status: DISCONTINUED | OUTPATIENT
Start: 2025-03-02 | End: 2025-03-04 | Stop reason: HOSPADM

## 2025-03-02 RX ORDER — ONDANSETRON 2 MG/ML
4 INJECTION INTRAMUSCULAR; INTRAVENOUS EVERY 6 HOURS PRN
Status: DISCONTINUED | OUTPATIENT
Start: 2025-03-02 | End: 2025-03-04

## 2025-03-02 RX ORDER — HYDROCODONE BITARTRATE AND ACETAMINOPHEN 7.5; 325 MG/1; MG/1
1 TABLET ORAL EVERY 4 HOURS PRN
Status: DISCONTINUED | OUTPATIENT
Start: 2025-03-02 | End: 2025-03-02 | Stop reason: HOSPADM

## 2025-03-02 RX ORDER — ACETAMINOPHEN 325 MG/1
650 TABLET ORAL EVERY 4 HOURS PRN
Status: DISCONTINUED | OUTPATIENT
Start: 2025-03-02 | End: 2025-03-04

## 2025-03-02 RX ORDER — METOPROLOL TARTRATE 50 MG
50 TABLET ORAL 3 TIMES DAILY
Status: DISCONTINUED | OUTPATIENT
Start: 2025-03-02 | End: 2025-03-02

## 2025-03-02 RX ORDER — HYDROMORPHONE HYDROCHLORIDE 1 MG/ML
0.5 INJECTION, SOLUTION INTRAMUSCULAR; INTRAVENOUS; SUBCUTANEOUS
Status: DISCONTINUED | OUTPATIENT
Start: 2025-03-02 | End: 2025-03-02

## 2025-03-02 RX ORDER — DIPHENHYDRAMINE HYDROCHLORIDE 50 MG/ML
12.5 INJECTION INTRAMUSCULAR; INTRAVENOUS
Status: DISCONTINUED | OUTPATIENT
Start: 2025-03-02 | End: 2025-03-02 | Stop reason: HOSPADM

## 2025-03-02 RX ORDER — CALCIUM CARBONATE 500 MG/1
2 TABLET, CHEWABLE ORAL 3 TIMES DAILY PRN
Status: DISCONTINUED | OUTPATIENT
Start: 2025-03-02 | End: 2025-03-04

## 2025-03-02 RX ORDER — FAMOTIDINE 10 MG/ML
20 INJECTION, SOLUTION INTRAVENOUS ONCE
Status: DISCONTINUED | OUTPATIENT
Start: 2025-03-02 | End: 2025-03-02

## 2025-03-02 RX ORDER — ATROPINE SULFATE 0.4 MG/ML
0.4 INJECTION, SOLUTION INTRAMUSCULAR; INTRAVENOUS; SUBCUTANEOUS ONCE AS NEEDED
Status: DISCONTINUED | OUTPATIENT
Start: 2025-03-02 | End: 2025-03-02 | Stop reason: HOSPADM

## 2025-03-02 RX ORDER — ACETAMINOPHEN 650 MG/1
650 SUPPOSITORY RECTAL EVERY 4 HOURS PRN
Status: DISCONTINUED | OUTPATIENT
Start: 2025-03-02 | End: 2025-03-02

## 2025-03-02 RX ORDER — EPHEDRINE SULFATE 50 MG/ML
5 INJECTION, SOLUTION INTRAVENOUS ONCE AS NEEDED
Status: DISCONTINUED | OUTPATIENT
Start: 2025-03-02 | End: 2025-03-02 | Stop reason: HOSPADM

## 2025-03-02 RX ORDER — PRAVASTATIN SODIUM 10 MG
10 TABLET ORAL DAILY
Status: DISCONTINUED | OUTPATIENT
Start: 2025-03-02 | End: 2025-03-04 | Stop reason: HOSPADM

## 2025-03-02 RX ORDER — PROMETHAZINE HYDROCHLORIDE 25 MG/1
25 TABLET ORAL ONCE AS NEEDED
Status: DISCONTINUED | OUTPATIENT
Start: 2025-03-02 | End: 2025-03-02 | Stop reason: HOSPADM

## 2025-03-02 RX ORDER — NALOXONE HCL 0.4 MG/ML
0.2 VIAL (ML) INJECTION AS NEEDED
Status: DISCONTINUED | OUTPATIENT
Start: 2025-03-02 | End: 2025-03-02 | Stop reason: HOSPADM

## 2025-03-02 RX ORDER — SODIUM CHLORIDE 0.9 % (FLUSH) 0.9 %
10 SYRINGE (ML) INJECTION EVERY 12 HOURS SCHEDULED
Status: DISCONTINUED | OUTPATIENT
Start: 2025-03-02 | End: 2025-03-02

## 2025-03-02 RX ORDER — FINASTERIDE 5 MG/1
5 TABLET, FILM COATED ORAL DAILY
Status: DISCONTINUED | OUTPATIENT
Start: 2025-03-02 | End: 2025-03-04 | Stop reason: HOSPADM

## 2025-03-02 RX ORDER — IOPAMIDOL 612 MG/ML
100 INJECTION, SOLUTION INTRAVASCULAR
Status: DISCONTINUED | OUTPATIENT
Start: 2025-03-02 | End: 2025-03-02

## 2025-03-02 RX ORDER — HYDRALAZINE HYDROCHLORIDE 20 MG/ML
5 INJECTION INTRAMUSCULAR; INTRAVENOUS
Status: DISCONTINUED | OUTPATIENT
Start: 2025-03-02 | End: 2025-03-02 | Stop reason: HOSPADM

## 2025-03-02 RX ORDER — PANTOPRAZOLE SODIUM 40 MG/1
40 TABLET, DELAYED RELEASE ORAL
Status: DISCONTINUED | OUTPATIENT
Start: 2025-03-02 | End: 2025-03-02

## 2025-03-02 RX ORDER — BUPIVACAINE HYDROCHLORIDE AND EPINEPHRINE 5; 5 MG/ML; UG/ML
INJECTION, SOLUTION EPIDURAL; INTRACAUDAL; PERINEURAL AS NEEDED
Status: DISCONTINUED | OUTPATIENT
Start: 2025-03-02 | End: 2025-03-02 | Stop reason: HOSPADM

## 2025-03-02 RX ORDER — MORPHINE SULFATE 2 MG/ML
4 INJECTION, SOLUTION INTRAMUSCULAR; INTRAVENOUS ONCE
Status: DISCONTINUED | OUTPATIENT
Start: 2025-03-02 | End: 2025-03-02

## 2025-03-02 RX ORDER — PANTOPRAZOLE SODIUM 40 MG/1
40 TABLET, DELAYED RELEASE ORAL
Status: DISCONTINUED | OUTPATIENT
Start: 2025-03-02 | End: 2025-03-04 | Stop reason: HOSPADM

## 2025-03-02 RX ORDER — HYDROCODONE BITARTRATE AND ACETAMINOPHEN 5; 325 MG/1; MG/1
1 TABLET ORAL ONCE AS NEEDED
Status: DISCONTINUED | OUTPATIENT
Start: 2025-03-02 | End: 2025-03-02 | Stop reason: HOSPADM

## 2025-03-02 RX ORDER — FLUMAZENIL 0.1 MG/ML
0.2 INJECTION INTRAVENOUS AS NEEDED
Status: DISCONTINUED | OUTPATIENT
Start: 2025-03-02 | End: 2025-03-02 | Stop reason: HOSPADM

## 2025-03-02 RX ORDER — ALPRAZOLAM 0.5 MG
0.5 TABLET ORAL 3 TIMES DAILY
Status: DISCONTINUED | OUTPATIENT
Start: 2025-03-02 | End: 2025-03-02

## 2025-03-02 RX ORDER — DEXAMETHASONE SODIUM PHOSPHATE 4 MG/ML
INJECTION, SOLUTION INTRA-ARTICULAR; INTRALESIONAL; INTRAMUSCULAR; INTRAVENOUS; SOFT TISSUE AS NEEDED
Status: DISCONTINUED | OUTPATIENT
Start: 2025-03-02 | End: 2025-03-02

## 2025-03-02 RX ORDER — ACETAMINOPHEN 160 MG/5ML
650 SOLUTION ORAL EVERY 4 HOURS PRN
Status: DISCONTINUED | OUTPATIENT
Start: 2025-03-02 | End: 2025-03-02

## 2025-03-02 RX ORDER — SODIUM CHLORIDE 9 MG/ML
40 INJECTION, SOLUTION INTRAVENOUS AS NEEDED
Status: DISCONTINUED | OUTPATIENT
Start: 2025-03-02 | End: 2025-03-04

## 2025-03-02 RX ORDER — BUPIVACAINE HCL/0.9 % NACL/PF 0.125 %
PLASTIC BAG, INJECTION (ML) EPIDURAL AS NEEDED
Status: DISCONTINUED | OUTPATIENT
Start: 2025-03-02 | End: 2025-03-02 | Stop reason: SURG

## 2025-03-02 RX ORDER — FUROSEMIDE 20 MG/1
20 TABLET ORAL 2 TIMES DAILY
Status: DISCONTINUED | OUTPATIENT
Start: 2025-03-02 | End: 2025-03-04 | Stop reason: HOSPADM

## 2025-03-02 RX ORDER — HYDROMORPHONE HYDROCHLORIDE 1 MG/ML
0.25 INJECTION, SOLUTION INTRAMUSCULAR; INTRAVENOUS; SUBCUTANEOUS
Status: DISCONTINUED | OUTPATIENT
Start: 2025-03-02 | End: 2025-03-02 | Stop reason: HOSPADM

## 2025-03-02 RX ORDER — ROCURONIUM BROMIDE 10 MG/ML
INJECTION, SOLUTION INTRAVENOUS AS NEEDED
Status: DISCONTINUED | OUTPATIENT
Start: 2025-03-02 | End: 2025-03-02 | Stop reason: SURG

## 2025-03-02 RX ORDER — NALOXONE HCL 0.4 MG/ML
0.4 VIAL (ML) INJECTION
Status: DISCONTINUED | OUTPATIENT
Start: 2025-03-02 | End: 2025-03-02

## 2025-03-02 RX ORDER — SODIUM CHLORIDE 0.9 % (FLUSH) 0.9 %
10 SYRINGE (ML) INJECTION AS NEEDED
Status: DISCONTINUED | OUTPATIENT
Start: 2025-03-02 | End: 2025-03-04

## 2025-03-02 RX ORDER — FENTANYL CITRATE 50 UG/ML
INJECTION, SOLUTION INTRAMUSCULAR; INTRAVENOUS AS NEEDED
Status: DISCONTINUED | OUTPATIENT
Start: 2025-03-02 | End: 2025-03-02 | Stop reason: SURG

## 2025-03-02 RX ORDER — METOPROLOL TARTRATE 50 MG
50 TABLET ORAL EVERY 8 HOURS
Status: DISCONTINUED | OUTPATIENT
Start: 2025-03-02 | End: 2025-03-04 | Stop reason: HOSPADM

## 2025-03-02 RX ORDER — PROPOFOL 10 MG/ML
VIAL (ML) INTRAVENOUS AS NEEDED
Status: DISCONTINUED | OUTPATIENT
Start: 2025-03-02 | End: 2025-03-02 | Stop reason: SURG

## 2025-03-02 RX ORDER — TAMSULOSIN HYDROCHLORIDE 0.4 MG/1
0.4 CAPSULE ORAL 2 TIMES DAILY
Status: DISCONTINUED | OUTPATIENT
Start: 2025-03-02 | End: 2025-03-04 | Stop reason: HOSPADM

## 2025-03-02 RX ORDER — SUCCINYLCHOLINE/SOD CL,ISO/PF 200MG/10ML
SYRINGE (ML) INTRAVENOUS AS NEEDED
Status: DISCONTINUED | OUTPATIENT
Start: 2025-03-02 | End: 2025-03-02 | Stop reason: SURG

## 2025-03-02 RX ORDER — DOCUSATE SODIUM 100 MG/1
100 CAPSULE, LIQUID FILLED ORAL 2 TIMES DAILY
Status: DISCONTINUED | OUTPATIENT
Start: 2025-03-02 | End: 2025-03-04 | Stop reason: HOSPADM

## 2025-03-02 RX ORDER — PROMETHAZINE HYDROCHLORIDE 25 MG/1
25 SUPPOSITORY RECTAL ONCE AS NEEDED
Status: DISCONTINUED | OUTPATIENT
Start: 2025-03-02 | End: 2025-03-02 | Stop reason: HOSPADM

## 2025-03-02 RX ORDER — ALPRAZOLAM 0.5 MG
0.5 TABLET ORAL EVERY 8 HOURS
Status: DISCONTINUED | OUTPATIENT
Start: 2025-03-02 | End: 2025-03-04 | Stop reason: HOSPADM

## 2025-03-02 RX ADMIN — TAMSULOSIN HYDROCHLORIDE 0.4 MG: 0.4 CAPSULE ORAL at 20:10

## 2025-03-02 RX ADMIN — DOCUSATE SODIUM 100 MG: 100 CAPSULE, LIQUID FILLED ORAL at 20:10

## 2025-03-02 RX ADMIN — FENTANYL CITRATE 25 MCG: 50 INJECTION, SOLUTION INTRAMUSCULAR; INTRAVENOUS at 00:50

## 2025-03-02 RX ADMIN — MAGNESIUM OXIDE TAB 400 MG (241.3 MG ELEMENTAL MG) 400 MG: 400 (241.3 MG) TAB at 08:09

## 2025-03-02 RX ADMIN — FENTANYL CITRATE 25 MCG: 50 INJECTION, SOLUTION INTRAMUSCULAR; INTRAVENOUS at 01:49

## 2025-03-02 RX ADMIN — HYDROMORPHONE HYDROCHLORIDE 0.25 MG: 1 INJECTION, SOLUTION INTRAMUSCULAR; INTRAVENOUS; SUBCUTANEOUS at 02:20

## 2025-03-02 RX ADMIN — Medication 12.5 MG: at 08:09

## 2025-03-02 RX ADMIN — METOPROLOL TARTRATE 50 MG: 50 TABLET, FILM COATED ORAL at 13:34

## 2025-03-02 RX ADMIN — DEXAMETHASONE SODIUM PHOSPHATE 6 MG: 4 INJECTION, SOLUTION INTRAMUSCULAR; INTRAVENOUS at 00:51

## 2025-03-02 RX ADMIN — FUROSEMIDE 20 MG: 20 TABLET ORAL at 20:10

## 2025-03-02 RX ADMIN — FENTANYL CITRATE 25 MCG: 50 INJECTION, SOLUTION INTRAMUSCULAR; INTRAVENOUS at 02:20

## 2025-03-02 RX ADMIN — FENTANYL CITRATE 25 MCG: 50 INJECTION, SOLUTION INTRAMUSCULAR; INTRAVENOUS at 02:33

## 2025-03-02 RX ADMIN — METOPROLOL TARTRATE 50 MG: 50 TABLET, FILM COATED ORAL at 20:10

## 2025-03-02 RX ADMIN — Medication 10 ML: at 08:15

## 2025-03-02 RX ADMIN — FUROSEMIDE 20 MG: 20 TABLET ORAL at 08:09

## 2025-03-02 RX ADMIN — SACUBITRIL AND VALSARTAN 1 TABLET: 24; 26 TABLET, FILM COATED ORAL at 20:09

## 2025-03-02 RX ADMIN — ALPRAZOLAM 0.5 MG: 0.5 TABLET ORAL at 04:17

## 2025-03-02 RX ADMIN — FINASTERIDE 5 MG: 5 TABLET, FILM COATED ORAL at 08:09

## 2025-03-02 RX ADMIN — ALPRAZOLAM 0.5 MG: 0.5 TABLET ORAL at 20:09

## 2025-03-02 RX ADMIN — PANTOPRAZOLE SODIUM 40 MG: 40 TABLET, DELAYED RELEASE ORAL at 16:30

## 2025-03-02 RX ADMIN — ACETAMINOPHEN 325MG 650 MG: 325 TABLET ORAL at 16:29

## 2025-03-02 RX ADMIN — ROCURONIUM BROMIDE 45 MG: 10 INJECTION, SOLUTION INTRAVENOUS at 00:54

## 2025-03-02 RX ADMIN — DOCUSATE SODIUM 100 MG: 100 CAPSULE, LIQUID FILLED ORAL at 08:09

## 2025-03-02 RX ADMIN — PANTOPRAZOLE SODIUM 40 MG: 40 TABLET, DELAYED RELEASE ORAL at 04:17

## 2025-03-02 RX ADMIN — ALPRAZOLAM 0.5 MG: 0.5 TABLET ORAL at 13:34

## 2025-03-02 RX ADMIN — TAMSULOSIN HYDROCHLORIDE 0.4 MG: 0.4 CAPSULE ORAL at 08:09

## 2025-03-02 RX ADMIN — LIDOCAINE HYDROCHLORIDE 90 MG: 20 INJECTION, SOLUTION INFILTRATION; PERINEURAL at 00:50

## 2025-03-02 RX ADMIN — SUGAMMADEX 200 MG: 100 INJECTION, SOLUTION INTRAVENOUS at 01:46

## 2025-03-02 RX ADMIN — OXYCODONE HYDROCHLORIDE 5 MG: 5 TABLET ORAL at 04:29

## 2025-03-02 RX ADMIN — PROPOFOL 130 MG: 10 INJECTION, EMULSION INTRAVENOUS at 00:50

## 2025-03-02 RX ADMIN — Medication 200 MG: at 00:50

## 2025-03-02 RX ADMIN — ANTACID TABLETS 2 TABLET: 500 TABLET, CHEWABLE ORAL at 22:12

## 2025-03-02 RX ADMIN — SODIUM CHLORIDE 2000 MG: 900 INJECTION INTRAVENOUS at 00:37

## 2025-03-02 RX ADMIN — Medication 100 MCG: at 00:59

## 2025-03-02 RX ADMIN — HYDROMORPHONE HYDROCHLORIDE 0.25 MG: 1 INJECTION, SOLUTION INTRAMUSCULAR; INTRAVENOUS; SUBCUTANEOUS at 02:33

## 2025-03-02 RX ADMIN — PRAVASTATIN SODIUM 10 MG: 10 TABLET ORAL at 08:09

## 2025-03-02 RX ADMIN — Medication 100 MCG: at 00:54

## 2025-03-02 RX ADMIN — SACUBITRIL AND VALSARTAN 1 TABLET: 24; 26 TABLET, FILM COATED ORAL at 08:08

## 2025-03-02 RX ADMIN — ROCURONIUM BROMIDE 5 MG: 10 INJECTION, SOLUTION INTRAVENOUS at 00:50

## 2025-03-02 RX ADMIN — Medication 100 MCG: at 01:04

## 2025-03-02 RX ADMIN — METOPROLOL TARTRATE 50 MG: 50 TABLET, FILM COATED ORAL at 04:17

## 2025-03-02 NOTE — ED NOTES
"Nursing report ED to floor  Harvey Leyva  84 y.o.  male    HPI :  HPI  Stated Reason for Visit: (S) Pt has a known hernia to his groin on the right side. Today it has began causing more pain than normal, and Pt is unable to push it back in as he normally would.  History Obtained From: EMS    Chief Complaint  Chief Complaint   Patient presents with    Hernia       Admitting doctor:   Jayro Ambrose MD    Admitting diagnosis:   The primary encounter diagnosis was Incarcerated inguinal hernia. A diagnosis of Partial small bowel obstruction was also pertinent to this visit.    Code status:   Current Code Status       Date Active Code Status Order ID Comments User Context       Not on file            Allergies:   Patient has no known allergies.    Isolation:   No active isolations    Intake and Output  No intake or output data in the 24 hours ending 03/01/25 2224    Weight:       03/01/25 1954   Weight: 86.2 kg (190 lb)       Most recent vitals:   Vitals:    03/01/25 1954 03/01/25 2029 03/01/25 2052 03/01/25 2102   BP: 119/69 139/68  136/68   BP Location: Right arm      Patient Position: Lying      Pulse: 91 62  73   Resp: 18      Temp: 97.8 °F (36.6 °C)      TempSrc: Tympanic      SpO2: 94% 92% 93%    Weight: 86.2 kg (190 lb)      Height: 180.3 cm (71\")          Active LDAs/IV Access:   Lines, Drains & Airways       Active LDAs       Name Placement date Placement time Site Days    Peripheral IV 03/01/25 2025 Right Antecubital 03/01/25 2025  Antecubital  less than 1                    Labs (abnormal labs have a star):   Labs Reviewed   COMPREHENSIVE METABOLIC PANEL - Abnormal; Notable for the following components:       Result Value    Glucose 142 (*)     All other components within normal limits    Narrative:     GFR Categories in Chronic Kidney Disease (CKD)      GFR Category          GFR (mL/min/1.73)    Interpretation  G1                     90 or greater         Normal or high (1)  G2                      60-89       "          Mild decrease (1)  G3a                   45-59                Mild to moderate decrease  G3b                   30-44                Moderate to severe decrease  G4                    15-29                Severe decrease  G5                    14 or less           Kidney failure          (1)In the absence of evidence of kidney disease, neither GFR category G1 or G2 fulfill the criteria for CKD.    eGFR calculation 2021 CKD-EPI creatinine equation, which does not include race as a factor   URINALYSIS W/ MICROSCOPIC IF INDICATED (NO CULTURE) - Abnormal; Notable for the following components:    Specific Gravity, UA >1.030 (*)     Ketones, UA 15 mg/dL (1+) (*)     All other components within normal limits    Narrative:     Urine microscopic not indicated.   CBC WITH AUTO DIFFERENTIAL - Abnormal; Notable for the following components:    .4 (*)     MCH 36.8 (*)     RDW 18.1 (*)     RDW-SD 71.7 (*)     All other components within normal limits   MANUAL DIFFERENTIAL - Abnormal; Notable for the following components:    Neutrophil % 77.6 (*)     Lymphocyte % 13.3 (*)     Eosinophil % 0.0 (*)     Neutrophils Absolute 7.50 (*)     nRBC 1.0 (*)     All other components within normal limits   CBC AND DIFFERENTIAL    Narrative:     The following orders were created for panel order CBC & Differential.  Procedure                               Abnormality         Status                     ---------                               -----------         ------                     CBC Auto Differential[[id number]]        Abnormal            Final result                 Please view results for these tests on the individual orders.       EKG:   No orders to display       Meds given in ED:   Medications   sodium chloride 0.9 % flush 10 mL (has no administration in time range)   HYDROmorphone (DILAUDID) injection 1 mg (has no administration in time range)   morphine injection 4 mg (4 mg Intravenous Given 3/1/25 2029)    ondansetron (ZOFRAN) injection 4 mg (4 mg Intravenous Given 3/1/25 2029)   iopamidol (ISOVUE-300) 61 % injection 100 mL (85 mL Intravenous Given 3/1/25 2046)       Imaging results:  CT Abdomen Pelvis With Contrast    Result Date: 3/1/2025   1. The patient has a small bowel containing right inguinal hernia. While air and fecal material are noted within the colon, there does appear to be a transition in the caliber of the distal small bowel, related to the bowel contained within the right inguinal hernia. Think findings are suspicious for at least early or partial small bowel obstruction. 2. Nonspecific distention of the stomach. The duodenum appears relatively decompressed.  Radiation dose reduction techniques were utilized, including automated exposure control and exposure modulation based on body size.   This report was finalized on 3/1/2025 9:11 PM by Dr. Albertina Wall M.D on Workstation: [id number]       Ambulatory status:   - stand by assist     Social issues:   Social History     Socioeconomic History    Marital status:    Tobacco Use    Smoking status: Former    Smokeless tobacco: Never   Vaping Use    Vaping status: Never Used   Substance and Sexual Activity    Alcohol use: No     Comment: caffeine use    Drug use: No    Sexual activity: Defer       Peripheral Neurovascular  Peripheral Neurovascular (Adult)  Peripheral Neurovascular WDL: WDL    Neuro Cognitive  Neuro Cognitive (Adult)  Cognitive/Neuro/Behavioral WDL: WDL    Learning  Learning Assessment  Learning Readiness and Ability: no barriers identified  Education Provided  Person Taught: patient, spouse  Teaching Method: verbal instruction  Teaching Focus: symptom/problem overview  Education Outcome Evaluation: eager to learn    Respiratory  Respiratory WDL  Respiratory WDL: WDL    Abdominal Pain       Pain Assessments  Pain (Adult)  (0-10) Pain Rating: Rest: 10  Pain Location: groin  Pain Side/Orientation: right    NIH Stroke Scale        Kinjal Calixto RN  03/01/25 22:24 EST

## 2025-03-02 NOTE — ED PROVIDER NOTES
EMERGENCY DEPARTMENT ENCOUNTER      PCP: Wero Chatman MD  Patient Care Team:  Wero Chatman MD as PCP - General (Family Medicine)  Harvey Gaitan MD as Cardiologist (Cardiology)  Micheal Reagan MD as Cardiologist (Cardiology)   Independent Historians: Patient    HPI:  Chief Complaint: Hernia   A complete HPI/ROS/PMH/PSH/SH/FH are unobtainable due to: None    Chronic or social conditions impacting patient care (social determinants of health): None    Context: Harvey Leyva is a 84 y.o. male w/ hx of cardiomyopathy, HLD, HTN who presents to the ED c/o acute right lower abdominal/inguinal pain that began about 3 hours prior to arrival.  Patient has a known hernia and states he is usually able to reduce it however today he noticed it came out and he was unable to push it back in.  He states he has significant pain at this time when she has not experienced with a hernia previously.  He denies any nausea, vomiting or diarrhea.  He has had normal bowel movements today.    Review of prior external notes and/or external test results outside of this encounter: BMP on 11/8/23 showed normal creatinine of 0.85. CBC on 3/19/24 showed hemoglobin of 15.7.       PAST MEDICAL HISTORY  Active Ambulatory Problems     Diagnosis Date Noted    Cardiomyopathy, dilated, nonischemic 03/02/2017    Ventricular tachycardia 02/08/2018    Hyperlipidemia LDL goal <100 12/15/2021    Incarcerated right inguinal hernia 10/10/2023    PVCs (premature ventricular contractions)     ICD (implantable cardioverter-defibrillator) in place 01/03/2024     Resolved Ambulatory Problems     Diagnosis Date Noted    No Resolved Ambulatory Problems     Past Medical History:   Diagnosis Date    Basal cell carcinoma     Dilated cardiomyopathy     Diverticulosis     Hernia, inguinal     History of prostate disorder     Hyperlipidemia     Hypertension     Hypotension     Slow to wake up after anesthesia        The patient has started, but not completed, their  COVID-19 vaccination series.    PAST SURGICAL HISTORY  Past Surgical History:   Procedure Laterality Date    BACK SURGERY N/A 1986    CARDIAC DEFIBRILLATOR PLACEMENT N/A 07/02/2009    Prophylactic AICD ([employer] Secura DR model # Q472JIP, serial # [id number])-Dr. Barrett Michael    CARDIAC ELECTROPHYSIOLOGY PROCEDURE Left 02/20/2017    Procedure: Device Replacement   ICD GEN CHG [employer];  Surgeon: Guido Michael MD;  Location: [facility] INVASIVE LOCATION;  Service:     CATARACT EXTRACTION Bilateral     COLONOSCOPY N/A 12/06/2010    Roel Clancy     FINE NEEDLE ASPIRATION N/A 11/29/2009    Pericardial fluid aspiration-Dr. Willian Quintanilla    INGUINAL HERNIA REPAIR Left 1984    laparoscopic    PROSTATE SURGERY      VEIN LIGATION AND STRIPPING           FAMILY HISTORY  Family History   Problem Relation Age of Onset    Heart failure Mother     Prostate cancer Father     Heart disease Brother     Prostate cancer Brother     Malig Hyperthermia Neg Hx          SOCIAL HISTORY  Social History     Socioeconomic History    Marital status:    Tobacco Use    Smoking status: Former    Smokeless tobacco: Never   Vaping Use    Vaping status: Never Used   Substance and Sexual Activity    Alcohol use: No     Comment: caffeine use    Drug use: No    Sexual activity: Defer         ALLERGIES  Patient has no known allergies.        REVIEW OF SYSTEMS  Review of Systems   Constitutional:  Negative for chills and fever.   Respiratory:  Negative for shortness of breath.    Cardiovascular:  Negative for chest pain.   Gastrointestinal:  Positive for abdominal pain. Negative for diarrhea, nausea and vomiting.   Genitourinary:  Negative for difficulty urinating.        All systems reviewed and negative except for those discussed in HPI.       PHYSICAL EXAM    I have reviewed the triage vital signs and nursing notes.    ED Triage Vitals [03/01/25 1954]   Temp Heart Rate Resp BP SpO2   97.8 °F (36.6 °C) 91 18 119/69 94 %       Temp src Heart Rate Source Patient Position BP Location FiO2 (%)   Tympanic Monitor Lying Right arm --       Physical Exam  GENERAL: alert, no acute distress  SKIN: Warm, dry  HENT: Normocephalic, atraumatic  EYES: no scleral icterus  CV: regular rhythm, regular rate  RESPIRATORY: normal effort, lungs clear  ABDOMEN: soft, right sided suprapubic and inguinal swelling and tenderness, hernia is firm and not able to be reduced, no testicular pain on palpation  MUSCULOSKELETAL: no deformity  NEURO: alert, moves all extremities, follows commands          LAB RESULTS  Recent Results (from the past 24 hours)   Comprehensive Metabolic Panel    Collection Time: 03/01/25  8:25 PM    Specimen: Blood   Result Value Ref Range    Glucose 142 (H) 65 - 99 mg/dL    BUN 16 8 - 23 mg/dL    Creatinine 0.89 0.76 - 1.27 mg/dL    Sodium 141 136 - 145 mmol/L    Potassium 4.0 3.5 - 5.2 mmol/L    Chloride 104 98 - 107 mmol/L    CO2 25.0 22.0 - 29.0 mmol/L    Calcium 9.3 8.6 - 10.5 mg/dL    Total Protein 6.7 6.0 - 8.5 g/dL    Albumin 4.2 3.5 - 5.2 g/dL    ALT (SGPT) 18 1 - 41 U/L    AST (SGOT) 23 1 - 40 U/L    Alkaline Phosphatase 63 39 - 117 U/L    Total Bilirubin 0.5 0.0 - 1.2 mg/dL    Globulin 2.5 gm/dL    A/G Ratio 1.7 g/dL    BUN/Creatinine Ratio 18.0 7.0 - 25.0    Anion Gap 12.0 5.0 - 15.0 mmol/L    eGFR 83.5 >60.0 mL/min/1.73   CBC Auto Differential    Collection Time: 03/01/25  8:25 PM    Specimen: Blood   Result Value Ref Range    WBC 9.66 3.40 - 10.80 10*3/mm3    RBC 4.40 4.14 - 5.80 10*6/mm3    Hemoglobin 16.2 13.0 - 17.7 g/dL    Hematocrit 47.7 37.5 - 51.0 %    .4 (H) 79.0 - 97.0 fL    MCH 36.8 (H) 26.6 - 33.0 pg    MCHC 34.0 31.5 - 35.7 g/dL    RDW 18.1 (H) 12.3 - 15.4 %    RDW-SD 71.7 (H) 37.0 - 54.0 fl    MPV 9.9 6.0 - 12.0 fL    Platelets 163 140 - 450 10*3/mm3    nRBC 0.0 0.0 - 0.2 /100 WBC   Manual Differential    Collection Time: 03/01/25  8:25 PM    Specimen: Blood   Result Value Ref Range    Neutrophil % 77.6  (H) 42.7 - 76.0 %    Lymphocyte % 13.3 (L) 19.6 - 45.3 %    Monocyte % 9.2 5.0 - 12.0 %    Eosinophil % 0.0 (L) 0.3 - 6.2 %    Basophil % 0.0 0.0 - 1.5 %    Neutrophils Absolute 7.50 (H) 1.70 - 7.00 10*3/mm3    Lymphocytes Absolute 1.28 0.70 - 3.10 10*3/mm3    Monocytes Absolute 0.89 0.10 - 0.90 10*3/mm3    Eosinophils Absolute 0.00 0.00 - 0.40 10*3/mm3    Basophils Absolute 0.00 0.00 - 0.20 10*3/mm3    nRBC 1.0 (H) 0.0 - 0.2 /100 WBC    Anisocytosis Mod/2+ None Seen    Macrocytes Mod/2+ None Seen    WBC Morphology Normal Normal    Platelet Morphology Normal Normal   Urinalysis With Microscopic If Indicated (No Culture) - Urine, Clean Catch    Collection Time: 03/01/25  9:40 PM    Specimen: Urine, Clean Catch   Result Value Ref Range    Color, UA Yellow Yellow, Straw    Appearance, UA Clear Clear    pH, UA 6.0 5.0 - 8.0    Specific Gravity, UA >1.030 (H) 1.005 - 1.030    Glucose, UA Negative Negative    Ketones, UA 15 mg/dL (1+) (A) Negative    Bilirubin, UA Negative Negative    Blood, UA Negative Negative    Protein, UA Negative Negative    Leuk Esterase, UA Negative Negative    Nitrite, UA Negative Negative    Urobilinogen, UA 0.2 E.U./dL 0.2 - 1.0 E.U./dL       Ordered the above labs and independently reviewed and interpreted the results.        RADIOLOGY  CT Abdomen Pelvis With Contrast    Result Date: 3/1/2025  CT OF THE ABDOMEN/PELVIS WITH CONTRAST  HISTORY: Groin pain. Patient has a history of a hernia.  COMPARISON: November 19, 2018  TECHNIQUE: Axial CT imaging was obtained through the abdomen and pelvis. IV contrast was administered.  FINDINGS: Images through the lung bases demonstrate scarring. There is elevation of the left hemidiaphragm, with associated bronchovascular crowding. The stomach does appear distended. The adrenal glands, spleen, and gallbladder appear normal, as is the duodenum. The pancreas appears atrophic. The kidneys enhance symmetrically. No hydronephrosis is seen. There our bilateral  renal cysts. There are aortoiliac calcifications. Prostate gland appears somewhat heterogeneous. Urinary bladder is normal. There is colonic diverticulosis. There are changes of left inguinal hernia repair. Patient does have a right inguinal hernia, which contains a loop of bowel. While air and fecal material are noted within the colon, there are dilated loops of small bowel within the lower abdomen, with a transition point noted within the hernia sac. At least partial or early small bowel obstruction is suspected. There is a trace amount of fluid which is noted within the hernia sac. No pneumatosis or free air is seen. No acute osseous abnormalities are identified. There is mild lumbar scoliosis, with convexity to the left. There is no evidence of appendicitis. There is bilateral gynecomastia.       1. The patient has a small bowel containing right inguinal hernia. While air and fecal material are noted within the colon, there does appear to be a transition in the caliber of the distal small bowel, related to the bowel contained within the right inguinal hernia. Think findings are suspicious for at least early or partial small bowel obstruction. 2. Nonspecific distention of the stomach. The duodenum appears relatively decompressed.  Radiation dose reduction techniques were utilized, including automated exposure control and exposure modulation based on body size.   This report was finalized on 3/1/2025 9:11 PM by Dr. Albertina Wall M.D on Workstation: [id number]       I ordered the above noted radiological studies. Independently reviewed and interpreted by me.  See dictation for official radiology interpretation.      PROCEDURES    Procedures      MEDICATIONS GIVEN IN ER    Medications   sodium chloride 0.9 % flush 10 mL (has no administration in time range)   morphine injection 4 mg (4 mg Intravenous Given 3/1/25 2029)   ondansetron (ZOFRAN) injection 4 mg (4 mg Intravenous Given 3/1/25 2029)   iopamidol  (ISOVUE-300) 61 % injection 100 mL (85 mL Intravenous Given 3/1/25 2046)         PROGRESS, DATA ANALYSIS, CONSULTS, AND MEDICAL DECISION MAKING    All labs have been independently reviewed and interpreted by me.  All radiology studies have been independently reviewed and interpreted by me and discussed with radiologist dictating the report.   EKG's independently reviewed and interpreted by me.  Discussion below represents my analysis of pertinent findings related to patient's condition, differential diagnosis, treatment plan and final disposition.    My differential diagnosis for abdominal pain includes but is not limited to:    Gastritis, gastroenteritis, peptic ulcer disease, GERD, esophageal perforation, acute appendicitis, mesenteric adenitis, Meckel’s diverticulum, epiploic appendagitis, diverticulitis, colon cancer, ulcerative colitis, Crohn’s disease, intussusception, small bowel obstruction, adhesions, ischemic bowel, perforated viscus, ileus, obstipation, biliary colic, cholecystitis, cholelithiasis, hepatitis, pancreatitis, common bile duct obstruction, cholangitis, bile leak, splenic trauma, splenic rupture, splenic infarction, splenic abscess, abdominal abscess, ascites, spontaneous bacterial peritonitis, hernia, UTI, cystitis, prostatitis, ureterolithiasis, urinary obstruction, pelvic abscess, AAA, myocardial infarction, pneumonia, cancer, porphyria, DKA, medications, sickle cell, viral syndrome, zoster      ED Course as of 03/01/25 2214   Sat Mar 01, 2025   2044 WBC: 9.66 [DC]   2044 Hemoglobin: 16.2 [DC]   2135 CT Abdomen Pelvis With Contrast  I reviewed CT images, loop of bowel right inguinal hernia, some air-fluid levels proximal, interpreted by self  I reviewed radiologist rotation of CT images [DN]   2148 Discussed case with Dr. Santillan, general surgery, who will come to ER to assess patient and attempt to reduce hernia. She requests patient be admitted to hospitalist service. [DC]   2149 Despite  placing patient in Trendelenburg with gentle pressure, unable to reduce hernia, moderately tender, firm, no overlying skin change [DN]   2208 Discussed case with ELVA Farr, who will admit the patient. [DC]      ED Course User Index  [DC] Latia Cagle PA  [DN] Joe Yoon MD             AS OF 22:14 EST VITALS:    BP - 136/68  HR - 73  TEMP - 97.8 °F (36.6 °C) (Tympanic)  O2 SATS - 93%        DIAGNOSIS  Final diagnoses:   Incarcerated inguinal hernia   Partial small bowel obstruction         DISPOSITION  ED Disposition       ED Disposition   Decision to Admit    Condition   --    Comment   Level of Care: Telemetry [5]   Diagnosis: Incarcerated hernia [618057]   Admitting Physician: THUY ACEVEDO [[id number]]   Attending Physician: THUY ACEVEDO [[id number]]   Is patient appropriate for Inpatient Observation Unit?: No [0]                    Note Disclaimer: At [facility], we believe that sharing information builds trust and better relationships. You are receiving this note because you recently visited [facility]. It is possible you will see health information before a provider has talked with you about it. This kind of information can be easy to misunderstand. To help you fully understand what it means for your health, we urge you to discuss this note with your provider.         Latia Cagle PA  03/01/25 2514

## 2025-03-02 NOTE — ED PROVIDER NOTES
MD ATTESTATION NOTE    The GIRISH and I have discussed this patient's history, physical exam, MDM, and treatment plan.  I have reviewed the documentation and personally had a face to face interaction with the patient. I affirm the documentation and agree with the treatment and plan.  The attached note describes my personal findings.      I provided a substantive portion of the medical decision making.        Brief HPI: 84-year-old male, history of hypertension, presents the ED for evaluation of right groin/hernia pain and swelling.  States he has hernias and wears a hernia belt, but 3 to 4 hours ago it significantly enlarged.  It has never been like this before.  States that usually is able to be reduced.  No other complaints at this time.    PHYSICAL EXAM  ED Triage Vitals [03/01/25 1954]   Temp Heart Rate Resp BP SpO2   97.8 °F (36.6 °C) 91 18 119/69 94 %      Temp src Heart Rate Source Patient Position BP Location FiO2 (%)   Tympanic Monitor Lying Right arm --         GENERAL: no acute distress  HENT: nares patent  EYES: no scleral icterus  CV: regular rhythm, normal rate  RESPIRATORY: normal effort  ABDOMEN: soft, moderate to large right inguinal hernia, moderately tender to palpation, no overlying skin changes, irreducible  MUSCULOSKELETAL: no deformity  NEURO: alert, moves all extremities, follows commands  PSYCH:  calm, cooperative  SKIN: warm, dry    Vital signs and nursing notes reviewed.        Medical Decision Making:  ED Course as of 03/01/25 2248   Sat Mar 01, 2025   2044 WBC: 9.66 [DC]   2044 Hemoglobin: 16.2 [DC]   2135 CT Abdomen Pelvis With Contrast  I reviewed CT images, loop of bowel right inguinal hernia, some air-fluid levels proximal, interpreted by self  I reviewed radiologist rotation of CT images [DN]   2148 Discussed case with Dr. Santillan, general surgery, who will come to ER to assess patient and attempt to reduce hernia. She requests patient be admitted to hospitalist service. [DC]   2149  Despite placing patient in Trendelenburg with gentle pressure, unable to reduce hernia, moderately tender, firm, no overlying skin change [DN]   2202 Discussed case with ELVA Farr, who will admit the patient. [DC]      ED Course User Index  [DC] Latia Cagle PA  [DN] Joe Yoon MD Nichols, Dylan J, MD  03/01/25 9908       Joe Yoon MD  03/01/25 8467

## 2025-03-02 NOTE — CONSULTS
General Surgery Consultation    Consulting Physician: Latia Santillan MD  Referring Physician: YASMIN Sow    Reason for consultation: Incarcerated inguinal hernia    CC: Right groin pain    HPI:   The patient is a very pleasant 84 y.o. male that presented to the hospital emergency room this evening with acute onset severe right groin pain with a nonreducible right inguinal hernia.  He has had a chronic but always reducible right inguinal hernia for many years and actually saw my partner, Dr. Wells, 2 years ago to discuss elective hernia repair.  He was initially scheduled for robotic inguinal hernia repair but canceled.  This evening he noticed that hernia was unable to be reduced and was extremely tender to palpation.  He denies any nausea or vomiting but came to the emergency room where a CT abdomen/pelvis demonstrated evidence for an incarcerated right inguinal hernia with associated small bowel obstruction.    Past Medical History:  Hypertension  Hyperlipidemia  Dilated cardiomyopathy  History of ventricular arrhythmia requiring ICD    Past Surgical History:  Left inguinal hernia repair with mesh (1984)  Defibrillator placement  Prostate surgery    Medications:  Denies any blood thinner use    Allergies: No known drug allergies    Social History: , former smoker, no regular alcohol use    Family History: Noncontributory  Family History   Problem Relation Age of Onset    Heart failure Mother     Prostate cancer Father     Heart disease Brother     Prostate cancer Brother     Malig Hyperthermia Neg Hx        Review of Systems:  Constitutional: denies any weight changes, fatigue, or weakness  Eyes: denies blurred/double vision or scleral icterus  Cardiovascular: denies chest pain, palpitations, or edemas  Respiratory: denies cough, sputum, or dyspnea  Gastrointestinal: denies nausea, vomiting, melena, hematochezia, diarrhea, or constipation  Genitourinary: Positive for right groin pain and a  nonreducible right inguinal hernia; denies dysuria or hematuria  Endocrine: denies cold intolerance, lethargy, or flushing  Hematologic: denies excessive bruising or bleeding  Musculoskeletal: denies weakness, joint swelling, joint pain, or stiffness  Neurologic: denies seizures, CVA, paresthesia, or peripheral neuropathy  Skin: denies change in nevi, rashes, masses, or jaundice     All other systems reviewed and were negative.    Physical Exam:   Vitals:    03/01/25 2102   BP: 136/68   Pulse: 73   Resp:    Temp:    SpO2:      Height: 180 cm  Weight: 86 kg  BMI: 26.5  GENERAL: awake and alert, no acute distress, oriented to person, place, and time  HEENT: normocephalic, atraumatic, no scleral icterus, moist mucous membranes  NECK: Supple, there is no thyromegaly or lymphadenopathy  RESPIRATORY: clear to auscultation, no wheezes, rales or rhonchi, symmetric air entry  CARDIOVASCULAR: regular rate and rhythm    GASTROINTESTINAL: Soft, nontender, nondistended  GENITOURINARY: Incarcerated right inguinal hernia is markedly tender to palpation extending into the scrotum with no appreciable erythema of the scrotal skin, testicles are descended bilaterally and nontender  MUSCULOSKELETAL: no cyanosis, clubbing, or edema   NEUROLOGIC: alert and oriented, normal speech, cranial nerves 2-12 grossly intact, no focal deficits   SKIN: Moist, warm, no rashes, no jaundice    Diagnostic workup:   Pertinent labs:   Results from last 7 days   Lab Units 03/01/25 2025   WBC 10*3/mm3 9.66   HEMOGLOBIN g/dL 16.2   HEMATOCRIT % 47.7   PLATELETS 10*3/mm3 163     Results from last 7 days   Lab Units 03/01/25 2025   SODIUM mmol/L 141   POTASSIUM mmol/L 4.0   CHLORIDE mmol/L 104   CO2 mmol/L 25.0   BUN mg/dL 16   CREATININE mg/dL 0.89   CALCIUM mg/dL 9.3   BILIRUBIN mg/dL 0.5   ALK PHOS U/L 63   ALT (SGPT) U/L 18   AST (SGOT) U/L 23   GLUCOSE mg/dL 142*       IMAGING:  CT ABDOMEN/PELVIS:  IMPRESSION:  1. The patient has a small bowel  containing right inguinal hernia. While air and fecal material are noted within the colon, there does appear to be a transition in the caliber of the distal small bowel, related to the bowel contained within the right inguinal hernia. Think findings are suspicious for at least early or partial small bowel obstruction.  2. Nonspecific distention of the stomach. The duodenum appears relatively decompressed.    Assessment and plan:     The patient is a 84 y.o. male with an incarcerated right inguinal hernia with associated small bowel obstruction    I was unable to reduce his incarcerated right inguinal hernia in the emergency room and it is markedly tender to palpation concerning for intestinal ischemia.  I have recommended proceeding to the operating room emergently tonight for laparoscopic versus open right inguinal hernia repair with possible mesh placement.  I counseled him on the possible need for a small bowel resection if irreversible intestinal ischemia is encountered at the time of his surgery.  This would make mesh placement contraindicated and would increase the risk for a recurrent inguinal hernia in the future.  I reviewed his CT scan and discussed the findings with the patient and his family members at bedside.  I also counseled them on the risks of surgery.  They were counseled on the risks of nonoperative management which would include progressively worsening intestinal ischemia, sepsis, and death.  For that reason, they have consented to proceed with emergency surgery and he should remain strict NPO.    Latia Santillan MD  General, Robotic, and Endoscopic Surgery  [facility]    [address], [address]  [place] [address]  P: [phone]  F: [phone]

## 2025-03-02 NOTE — OP NOTE
Operative Note :  Latia Santillan MD      Harvey Leyva  1940    Procedure Date: 03/02/25    Pre-op Diagnosis:  Incarcerated inguinal hernia [K40.30]  Partial small bowel obstruction [K56.600]    Post-Operative Diagnosis:  Post-Op Diagnosis Codes:     * Incarcerated inguinal hernia [K40.30]     * Partial small bowel obstruction [K56.600]    Procedure:   Laparoscopic robotic assisted repair of incarcerated right inguinal hernia with mesh    Surgeon: Latia Santillan MD    Assistant: KRISHNA Ledezma (Camila was responsible for suctioning, retracting, suturing of all surgical incisions, and application of sterile dressings at the completion of the case)    Anesthesia:  General (general endotracheal tube)    Estimated Blood Loss: minimal    Specimens: None    Complications: None    Indications:  The patient is an 84-year-old gentleman with a longstanding reducible right inguinal hernia that became incarcerated this evening with severe pain.  I attempted to reduce the hernia the emergency room but was unsuccessful.  I have recommended proceeding with an emergency laparoscopic robotic assisted repair of this incarcerated hernia this evening.  He has been counseled on the risks of the procedure which include but are not limited to bleeding, wound infection, possible hernia recurrence, possible damage to the testicular vasculature or vas deferens which could cause testicular ischemia, and possible damage to pelvic floor structures such as the iliac vessels or ureter.  Despite these risks, he has consented to proceed.    Findings: Incarcerated indirect right inguinal hernia    Description of procedure:  The patient was brought to the operating room and placed on the OR table in supine position.  An endotracheal tube was inserted and general anesthesia induced.  A Alfred catheter was inserted into the urinary bladder using sterile technique.  With gentle inward pressure on the incarcerated right inguinal hernia, I  was eventually able to reduce its contents.  The overlying skin was shaved, prepped, and draped in a sterile fashion.  A surgical timeout was completed.  A supraumbilical 12 mm skin incision was made after instillation of 0.5% Marcaine with epinephrine.  With upward traction on the billable stalk, a longitudinal fasciotomy was made and an 8 mm robotic trocar inserted.  This was secured with 0 Vicryl stay sutures.  The abdomen was insufflated and under direct visualization, 2 additional 8 mm robotic trocars were placed to the left and right of the Mortensen.  The robot was then docked and camera inserted.  The pelvic floor was inspected and showed an indirect right inguinal hernia.  There was no evidence for a recurrent left-sided inguinal hernia.  I then went to the robotic console to complete the dissection.  Beginning laterally near the right anterior superior iliac spine, the peritoneum was incised sharply and working medially I dissected the peritoneal flap off of the undersurface of the rectus abdominis muscles.  Nicolas's ligament was exposed medially and the hernia sac in conjunction with the spermatic cord structures were slowly reduced from the inguinal canal.  The hernia sac was dissected off of the spermatic cord structures, gently sweeping the testicular vasculature and vas deferens posteriorly and inferiorly.  Once the entirety of the hernia sac had been reduced, further dissection was carried out with electrocautery to reveal the remainder of the preperitoneal space including the iliac vessels posteriorly and more of Nicolas's ligament medially.  A large mid weight 3D max Bard polypropylene mesh was inserted into the preperitoneal space and secured to Nicolas's ligament using a 2-0 Vicryl suture.  The mesh was positioned to cover not only the indirect inguinal hernia but also potential direct and femoral hernia spaces.  The peritoneum was brought back up over the mesh and secured to itself using a running  2-0 Monocryl barbed Stratafix suture forward and back upon itself.  There was a small peritoneal defect located inferiorly that was closed using an additional running 2-0 Monocryl barbed Stratafix suture.  All needles were withdrawn from the abdominal cavity and the abdomen desufflated.  The robot was undocked and all trocars were removed.  The fascia at the umbilicus was closed using a new 0 Vicryl figure-of-eight suture.  All skin incisions were then closed using 4-0 Vicryl subcuticular suture and topical Exofin glue.  His Alfred catheter was removed, both testicles palpated down within the scrotum, and is much gas within the scrotum could be decompressed was manually evacuated.  He was then extubated and transferred to PACU in stable condition with all counts correct per nursing.    Latia Santillan MD  General, Robotic, and Endoscopic Surgery  [facility]    [address], [address]  [place] [address]  P: [phone]  F: [phone]

## 2025-03-02 NOTE — ANESTHESIA POSTPROCEDURE EVALUATION
"Patient: Harvey Leyva    Procedure Summary       Date: 03/02/25 Room / Location: [facility] OR [address] [place] [address] MAIN OR    Anesthesia Start: 0044 Anesthesia Stop: 0201    Procedure: Laparoscopic robotic-assisted incarcerated right inguinal hernia repair with mesh (Right: Abdomen) Diagnosis:       Incarcerated inguinal hernia      Partial small bowel obstruction      (Incarcerated inguinal hernia [K40.30])      (Partial small bowel obstruction [K56.600])    Surgeons: Latia Santillan MD Provider: Fortunato Dickerson MD    Anesthesia Type: general ASA Status: 4 - Emergent            Anesthesia Type: general    Vitals  Vitals Value Taken Time   /69 03/02/25 0245   Temp 36.6 °C (97.9 °F) 03/02/25 0245   Pulse 94 03/02/25 0254   Resp 16 03/02/25 0245   SpO2 94 % 03/02/25 0254   Vitals shown include unfiled device data.        Post Anesthesia Care and Evaluation    Patient location during evaluation: PACU  Patient participation: complete - patient participated  Level of consciousness: awake and alert  Pain management: adequate    Airway patency: patent  Anesthetic complications: No anesthetic complications    Cardiovascular status: acceptable  Respiratory status: acceptable  Hydration status: acceptable    Comments: /69   Pulse 93   Temp 36.6 °C (97.9 °F) (Oral)   Resp 16   Ht 180.3 cm (71\")   Wt 86.2 kg (190 lb)   SpO2 96%   BMI 26.50 kg/m²       "

## 2025-03-02 NOTE — PROGRESS NOTES
"General Surgery  Progress Note    CC: Follow-up incarcerated right inguinal hernia    POD#0 laparoscopic robotic assisted repair of incarcerated right inguinal hernia with mesh    S: He feels good since undergoing laparoscopic repair of his incarcerated inguinal hernia earlier this morning.  He has been voiding independently.    O:/67   Pulse 85   Temp 98.7 °F (37.1 °C) (Oral)   Resp 16   Ht 180.3 cm (71\")   Wt 86.2 kg (190 lb)   SpO2 95%   BMI 26.50 kg/m²     GENERAL: alert, well appearing, and in no distress  HEENT: normocephalic, atraumatic, moist mucous membranes, clear sclerae   CHEST: clear to auscultation, no wheezes, rales or rhonchi, symmetric air entry  CARDIAC: regular rate and rhythm    ABDOMEN: Soft, nondistended, incisions clean/dry/intact with glue and mild ecchymosis around the umbilicus, no palpable inguinal hernia bilaterally and testicles are descended bilaterally and nontender  EXTREMITIES: no cyanosis, clubbing, or edema   SKIN: Warm and moist, no rashes    LABS  Results from last 7 days   Lab Units 03/01/25 2025   WBC 10*3/mm3 9.66   HEMOGLOBIN g/dL 16.2   HEMATOCRIT % 47.7   PLATELETS 10*3/mm3 163   MONOCYTES % % 9.2   EOSINOPHIL % % 0.0*     Results from last 7 days   Lab Units 03/01/25 2025   SODIUM mmol/L 141   POTASSIUM mmol/L 4.0   CHLORIDE mmol/L 104   CO2 mmol/L 25.0   BUN mg/dL 16   CREATININE mg/dL 0.89   CALCIUM mg/dL 9.3   BILIRUBIN mg/dL 0.5   ALK PHOS U/L 63   ALT (SGPT) U/L 18   AST (SGOT) U/L 23   GLUCOSE mg/dL 142*         A/P: 84 y.o. male POD#0 laparoscopic robotic assisted repair of incarcerated right inguinal hernia with mesh    Advance to regular diet.  Await return of bowel function.  Once he has at least some flatus, he can probably be discharged to home.    Latia Santillan MD  General, Robotic, and Endoscopic Surgery  [facility]    [address], [address]  [place] [address]  P: [phone]  F: [phone]     "

## 2025-03-02 NOTE — PLAN OF CARE
Problem: Adult Inpatient Plan of Care  Goal: Absence of Hospital-Acquired Illness or Injury  Intervention: Identify and Manage Fall Risk  Recent Flowsheet Documentation  Taken 3/2/2025 0600 by Camila Ibrahim RN  Safety Promotion/Fall Prevention: nonskid shoes/slippers when out of bed  Taken 3/2/2025 0426 by Camila Ibrahim RN  Safety Promotion/Fall Prevention:   clutter free environment maintained   lighting adjusted   room organization consistent   safety round/check completed  Taken 3/2/2025 0245 by Camila Ibrahim RN  Safety Promotion/Fall Prevention:   clutter free environment maintained   nonskid shoes/slippers when out of bed   room organization consistent   patient off unit   safety round/check completed     Problem: Adult Inpatient Plan of Care  Goal: Absence of Hospital-Acquired Illness or Injury  Intervention: Prevent Skin Injury  Recent Flowsheet Documentation  Taken 3/2/2025 0600 by Camila Ibrahim RN  Body Position:   side-lying   30 degrees lateral   left  Taken 3/2/2025 0426 by Camila Ibrahim RN  Body Position: sitting up in bed     Problem: Adult Inpatient Plan of Care  Goal: Absence of Hospital-Acquired Illness or Injury  Intervention: Prevent Infection  Recent Flowsheet Documentation  Taken 3/2/2025 0600 by Camila Ibrahim RN  Infection Prevention:   rest/sleep promoted   personal protective equipment utilized  Taken 3/2/2025 0245 by Camila Ibrahim RN  Infection Prevention:   hand hygiene promoted   rest/sleep promoted   personal protective equipment utilized     Problem: Adult Inpatient Plan of Care  Goal: Optimal Comfort and Wellbeing  Intervention: Provide Person-Centered Care  Recent Flowsheet Documentation  Taken 3/2/2025 0245 by Camila Ibrahim RN  Trust Relationship/Rapport:   care explained   choices provided   reassurance provided     Problem: Adult Inpatient Plan of Care  Goal: Readiness for Transition of Care  Intervention: Mutually Develop Transition Plan  Recent Flowsheet  Documentation  Taken 3/2/2025 0345 by Camila Ibrahim, RN  Transportation Anticipated: family or friend will provide  Patient/Family Anticipated Services at Transition: none  Patient/Family Anticipates Transition to: home with family  Taken 3/2/2025 0342 by Camila Ibrahim, RN  Transportation Anticipated: family or friend will provide  Patient/Family Anticipated Services at Transition: none  Taken 3/2/2025 0339 by Camila Ibrahim, RN  Equipment Currently Used at Home: (heart pacer) --   Goal Outcome Evaluation:  Plan of Care Reviewed With: patient, spouse, child        Progress: improving  Outcome Evaluation: Pt arrived from the PACU along with family. Pt was able to climb off stretcher and walk to the bed with hands on assist. Pt stated that his pain was under control and that he is looking forward to being discharged today. Pt became concerned and c/o elevated heart rate and anxiety once his family left. Staff was able to medications for the pt and pharmacy retimed the doseage accordingly, pt took the medication and became that he wouldn't get the medications he missed because of his surgery. It was explained that the medications would be given as ordered and he didn't have to worry. Pt was able to void using the urinal without any difficulty. Pt was able to relax and get some sleep. Son called and was given update on how his Father was doing. Staff WCTM and provide a safe environment.

## 2025-03-02 NOTE — ED NOTES
"Pt arrives from home via EMS.    EMS states \"Pt has a hernia to his groin that has popped out and causing him pain. Pt reports that he can normally just pop it back in, but has not been able to today.\"  "

## 2025-03-02 NOTE — PLAN OF CARE
Goal Outcome Evaluation:   Problem: Adult Inpatient Plan of Care  Goal: Plan of Care Review  Outcome: Progressing   VSS. A&Ox4. Weaned to room air. NSR. Active bowel sounds, has not passed gas or BM yet. Now on regular diet. Tolerating diet so far. Pt ambulated in the hallway twice with walker, ambulated without difficulty. PRN tylenol for c/o surgical pain.

## 2025-03-02 NOTE — ANESTHESIA PREPROCEDURE EVALUATION
Anesthesia Evaluation     history of anesthetic complications:  prolonged sedation  NPO Solid Status: Waived due to emergency  NPO Liquid Status: Waived due to emergency           Airway   Mallampati: II  Dental - normal exam     Pulmonary    Cardiovascular   Exercise tolerance: good (4-7 METS)    Rhythm: regular  Rate: normal    (+) pacemaker ICD, pacemaker, hypertension, dysrhythmias PVC, Tachycardia, hyperlipidemia    ROS comment: Echo 2022  · There is akinesis of the basal to mid inferior wall. There is moderate hypokinesis of the basal to mid inferoseptum  · Left ventricular ejection fraction appears to be 41 - 45%.  · Left ventricular diastolic function is consistent with (grade I) impaired relaxation.  · Normal right ventricular cavity size and systolic function noted.  · The left atrial cavity is mild to moderately dilated.  · Mild tricuspid valve regurgitation is present.  · Calculated right ventricular systolic pressure from tricuspid regurgitation is 29 mmHg.  · There is no evidence of pericardial effusion         Neuro/Psych  GI/Hepatic/Renal/Endo      Musculoskeletal     Abdominal    Substance History      OB/GYN          Other      history of cancer                  Anesthesia Plan    ASA 4 - emergent     general   Rapid sequence  (Incarcerated ing hernia with small bowel obstruction)  intravenous induction     Anesthetic plan, risks, benefits, and alternatives have been provided, discussed and informed consent has been obtained with: patient.      CODE STATUS:

## 2025-03-02 NOTE — H&P
History and physical    Primary care physician  Dr. Wero Chatman    Chief complaint  Abdominal pain    History of present illness  84-year-old white male with history of inguinal hernia cardiomyopathy hypertension hyperlipidemia presented to [facility] emergency room with abdominal pain and right groin pain.  Patient workup in ER revealed incarcerated inguinal hernia and partial small bowel obstruction and taken to the OR by surgery and underwent laparoscopic robotic assisted incarcerated right inguinal hernia repair with mesh and feeling much better this morning but still hurting.  Patient tolerating liquid diet.  Patient denies any nausea vomiting diarrhea.  Patient able to give me good history and family also contributed.    PAST MEDICAL HISTORY   Basal cell carcinoma      Dilated cardiomyopathy      Diverticulosis      Hernia, inguinal      History of prostate disorder      Hyperlipidemia      Hypertension      Hypotension      Slow to wake up after anesthesia        PAST SURGICAL HISTORY              Procedure Laterality Date    BACK SURGERY N/A 1986    CARDIAC DEFIBRILLATOR PLACEMENT N/A 07/02/2009     Prophylactic AICD ([employer] [address] model # G229JAW, serial # [id number])-Dr. Barrett Michael    CARDIAC ELECTROPHYSIOLOGY PROCEDURE Left 02/20/2017     Procedure: Device Replacement   ICD GEN CHG MEDTRONIC;  Surgeon: Guido Michael MD;  Location: [facility] INVASIVE LOCATION;  Service:     CATARACT EXTRACTION Bilateral      COLONOSCOPY N/A 12/06/2010     Roel Clancy     FINE NEEDLE ASPIRATION N/A 11/29/2009     Pericardial fluid aspiration-Dr. Willian Quintanilla    INGUINAL HERNIA REPAIR Left 1984     laparoscopic    PROSTATE SURGERY        VEIN LIGATION AND STRIPPING             FAMILY HISTORY           Problem Relation Age of Onset    Heart failure Mother      Prostate cancer Father      Heart disease Brother      Prostate cancer Brother      Malig Hyperthermia Neg Hx        SOCIAL  "HISTORY                 Socioeconomic History    Marital status:    Tobacco Use    Smoking status: Former    Smokeless tobacco: Never   Vaping Use    Vaping status: Never Used   Substance and Sexual Activity    Alcohol use: No       Comment: caffeine use    Drug use: No    Sexual activity: Defer         ALLERGIES  Patient has no known allergies.  Home medications reviewed     REVIEW OF SYSTEMS  Constitutional:  Negative for chills and fever.   Respiratory:  Negative for shortness of breath.    Cardiovascular:  Negative for chest pain.   Gastrointestinal:  Positive for abdominal pain. Negative for diarrhea, nausea and vomiting.   Genitourinary:  Negative for difficulty urinating.   All systems reviewed and negative except for those discussed in HPI.      PHYSICAL EXAM   Blood pressure 123/67, pulse 85, temperature 98.7 °F (37.1 °C), temperature source Oral, resp. rate 16, height 180.3 cm (71\"), weight 86.2 kg (190 lb), SpO2 95%.    GENERAL: Alert alert, no acute distress  SKIN: Warm, dry  HENT: Normocephalic, atraumatic  EYES: no scleral icterus  CV: regular rhythm, regular rate  RESPIRATORY: normal effort, moving air bilaterally  ABDOMEN: soft, diffuse tenderness bowel sounds positive  MUSCULOSKELETAL: no deformity  NEURO: alert, moves all extremities, follows commands     LAB RESULTS  Lab Results (last 24 hours)       Procedure Component Value Units Date/Time    Urinalysis With Microscopic If Indicated (No Culture) - Urine, Clean Catch [[id number]]  (Abnormal) Collected: 03/01/25 2140    Specimen: Urine, Clean Catch Updated: 03/01/25 2158     Color, UA Yellow     Appearance, UA Clear     pH, UA 6.0     Specific Gravity, UA >1.030     Glucose, UA Negative     Ketones, UA 15 mg/dL (1+)     Bilirubin, UA Negative     Blood, UA Negative     Protein, UA Negative     Leuk Esterase, UA Negative     Nitrite, UA Negative     Urobilinogen, UA 0.2 E.U./dL    Narrative:      Urine microscopic not indicated.    Manual " Differential [[id number]]  (Abnormal) Collected: 03/01/25 2025    Specimen: Blood Updated: 03/01/25 2102     Neutrophil % 77.6 %      Lymphocyte % 13.3 %      Monocyte % 9.2 %      Eosinophil % 0.0 %      Basophil % 0.0 %      Neutrophils Absolute 7.50 10*3/mm3      Lymphocytes Absolute 1.28 10*3/mm3      Monocytes Absolute 0.89 10*3/mm3      Eosinophils Absolute 0.00 10*3/mm3      Basophils Absolute 0.00 10*3/mm3      nRBC 1.0 /100 WBC      Anisocytosis Mod/2+     Macrocytes Mod/2+     WBC Morphology Normal     Platelet Morphology Normal    Comprehensive Metabolic Panel [[id number]]  (Abnormal) Collected: 03/01/25 2025    Specimen: Blood Updated: 03/01/25 2053     Glucose 142 mg/dL      BUN 16 mg/dL      Creatinine 0.89 mg/dL      Sodium 141 mmol/L      Potassium 4.0 mmol/L      Chloride 104 mmol/L      CO2 25.0 mmol/L      Calcium 9.3 mg/dL      Total Protein 6.7 g/dL      Albumin 4.2 g/dL      ALT (SGPT) 18 U/L      AST (SGOT) 23 U/L      Alkaline Phosphatase 63 U/L      Total Bilirubin 0.5 mg/dL      Globulin 2.5 gm/dL      A/G Ratio 1.7 g/dL      BUN/Creatinine Ratio 18.0     Anion Gap 12.0 mmol/L      eGFR 83.5 mL/min/1.73     Narrative:      GFR Categories in Chronic Kidney Disease (CKD)      GFR Category          GFR (mL/min/1.73)    Interpretation  G1                     90 or greater         Normal or high (1)  G2                      60-89                Mild decrease (1)  G3a                   45-59                Mild to moderate decrease  G3b                   30-44                Moderate to severe decrease  G4                    15-29                Severe decrease  G5                    14 or less           Kidney failure          (1)In the absence of evidence of kidney disease, neither GFR category G1 or G2 fulfill the criteria for CKD.    eGFR calculation 2021 CKD-EPI creatinine equation, which does not include race as a factor    CBC & Differential [[id number]]  (Abnormal) Collected: 03/01/25 2025     Specimen: Blood Updated: 03/01/25 2038    Narrative:      The following orders were created for panel order CBC & Differential.  Procedure                               Abnormality         Status                     ---------                               -----------         ------                     CBC Auto Differential[[id number]]        Abnormal            Final result                 Please view results for these tests on the individual orders.    CBC Auto Differential [[id number]]  (Abnormal) Collected: 03/01/25 2025    Specimen: Blood Updated: 03/01/25 2038     WBC 9.66 10*3/mm3      RBC 4.40 10*6/mm3      Hemoglobin 16.2 g/dL      Hematocrit 47.7 %      .4 fL      MCH 36.8 pg      MCHC 34.0 g/dL      RDW 18.1 %      RDW-SD 71.7 fl      MPV 9.9 fL      Platelets 163 10*3/mm3      nRBC 0.0 /100 WBC           Imaging Results (Last 24 Hours)       Procedure Component Value Units Date/Time    CT Abdomen Pelvis With Contrast [[id number]] Collected: 03/01/25 2104     Updated: 03/01/25 2115    Narrative:      CT OF THE ABDOMEN/PELVIS WITH CONTRAST     HISTORY: Groin pain. Patient has a history of a hernia.     COMPARISON: November 19, 2018     TECHNIQUE: Axial CT imaging was obtained through the abdomen and pelvis.  IV contrast was administered.     FINDINGS:  Images through the lung bases demonstrate scarring. There is elevation  of the left hemidiaphragm, with associated bronchovascular crowding. The  stomach does appear distended. The adrenal glands, spleen, and  gallbladder appear normal, as is the duodenum. The pancreas appears  atrophic. The kidneys enhance symmetrically. No hydronephrosis is seen.  There our bilateral renal cysts. There are aortoiliac calcifications.  Prostate gland appears somewhat heterogeneous. Urinary bladder is  normal. There is colonic diverticulosis. There are changes of left  inguinal hernia repair. Patient does have a right inguinal hernia, which  contains a loop of bowel.  While air and fecal material are noted within  the colon, there are dilated loops of small bowel within the lower  abdomen, with a transition point noted within the hernia sac. At least  partial or early small bowel obstruction is suspected. There is a trace  amount of fluid which is noted within the hernia sac. No pneumatosis or  free air is seen. No acute osseous abnormalities are identified. There  is mild lumbar scoliosis, with convexity to the left. There is no  evidence of appendicitis. There is bilateral gynecomastia.       Impression:         1. The patient has a small bowel containing right inguinal hernia. While  air and fecal material are noted within the colon, there does appear to  be a transition in the caliber of the distal small bowel, related to the  bowel contained within the right inguinal hernia. Think findings are  suspicious for at least early or partial small bowel obstruction.  2. Nonspecific distention of the stomach. The duodenum appears  relatively decompressed.     Radiation dose reduction techniques were utilized, including automated  exposure control and exposure modulation based on body size.        This report was finalized on 3/1/2025 9:11 PM by Dr. Albertina Wall M.D on Workstation: [id number]               Current Facility-Administered Medications:     acetaminophen (TYLENOL) tablet 650 mg, 650 mg, Oral, Q4H PRN **OR** acetaminophen (TYLENOL) 160 MG/5ML oral solution 650 mg, 650 mg, Oral, Q4H PRN **OR** acetaminophen (TYLENOL) suppository 650 mg, 650 mg, Rectal, Q4H PRN, Latia Santillan MD    ALPRAZolam (XANAX) tablet 0.5 mg, 0.5 mg, Oral, Q8H, Jayro Ambrose MD, 0.5 mg at 03/02/25 1334    docusate sodium (COLACE) capsule 100 mg, 100 mg, Oral, BID, Latia Santillan MD, 100 mg at 03/02/25 0809    finasteride (PROSCAR) tablet 5 mg, 5 mg, Oral, Daily, Latia Santillan MD, 5 mg at 03/02/25 0809    furosemide (LASIX) tablet 20 mg, 20 mg, Oral, BID, Latia Santillan MD,  20 mg at 03/02/25 0809    HYDROmorphone (DILAUDID) injection 0.5 mg, 0.5 mg, Intravenous, Q2H PRN **AND** naloxone (NARCAN) injection 0.4 mg, 0.4 mg, Intravenous, Q5 Min PRN, Latia Santillan MD    magnesium oxide (MAG-OX) tablet 400 mg, 400 mg, Oral, Daily, Latia Santillan MD, 400 mg at 03/02/25 0809    metoprolol tartrate (LOPRESSOR) tablet 50 mg, 50 mg, Oral, Q8H, Jayro Ambrose MD, 50 mg at 03/02/25 1334    ondansetron (ZOFRAN) injection 4 mg, 4 mg, Intravenous, Q6H PRN, Latia Santillan MD    oxyCODONE (ROXICODONE) immediate release tablet 5 mg, 5 mg, Oral, Q4H PRN, Latia Santillan MD, 5 mg at 03/02/25 0429    pantoprazole (PROTONIX) EC tablet 40 mg, 40 mg, Oral, BID AC, Jayro Ambrose MD    pravastatin (PRAVACHOL) tablet 10 mg, 10 mg, Oral, Daily, Latia Santillan MD, 10 mg at 03/02/25 0809    sacubitril-valsartan (ENTRESTO) 24-26 MG tablet 1 tablet, 1 tablet, Oral, BID, Latia Santillan MD, 1 tablet at 03/02/25 0808    [COMPLETED] Insert Peripheral IV, , , Once **AND** sodium chloride 0.9 % flush 10 mL, 10 mL, Intravenous, PRN, Jayro Ambrose MD    sodium chloride 0.9 % flush 10 mL, 10 mL, Intravenous, PRN, Latia Santillan MD    sodium chloride 0.9 % infusion 40 mL, 40 mL, Intravenous, PRN, Latia Santillan MD    spironolactone (ALDACTONE) half tablet 12.5 mg, 12.5 mg, Oral, Daily, Latia Santillan MD, 12.5 mg at 03/02/25 0809    tamsulosin (FLOMAX) 24 hr capsule 0.4 mg, 0.4 mg, Oral, BID, Latia Santillan MD, 0.4 mg at 03/02/25 0809     ASSESSMENT  Incarcerated right inguinal hernia with partial small bowel obstruction  Status post laparoscopic robotic assisted repair of incarcerated right inguinal hernia  Dilated nonischemic cardiomyopathy status post AICD  Hypertension  Hyperlipidemia  BPH   Anxiety disorder  Gastroesophageal reflux disease    PLAN  Admit  Postop care  Clear liquid diet and advance as tolerated  General Surgery to follow.  Continue home medications  Stress  ulcer DVT prophylaxis  Supportive care  PT OT  Patient is full code  Discussed with family nursing staff  Follow closely further recommendation current hospital course    THUY ACEVEDO MD

## 2025-03-02 NOTE — ANESTHESIA PROCEDURE NOTES
Airway  Urgency: emergent    Date/Time: 3/2/2025 12:53 AM  Airway not difficult    General Information and Staff    Patient location during procedure: OR  Anesthesiologist: Fortunato Dickerson MD  CRNA/CAA: Whitley Sewell CRNA    Consent for Airway (if performed for an anesthetic, see related documentation for consents)  Patient identity confirmed: verbally with patient and arm band  Consent: No emergent situation. Verbal consent not obtained. Written consent obtained.  Risks and benefits: risks, benefits and alternatives were discussed  Consent given by: patient      Indications and Patient Condition  Indications for airway management: airway protection    Preoxygenated: yes  MILS not maintained throughout  Mask difficulty assessment: 0 - not attempted    Final Airway Details  Final airway type: endotracheal airway      Successful airway: ETT  Cuffed: yes   Successful intubation technique: direct laryngoscopy and RSI  Facilitating devices/methods: intubating stylet  Endotracheal tube insertion site: oral  Blade: Haider  Blade size: 4  ETT size (mm): 7.5  Cormack-Lehane Classification: grade I - full view of glottis  Placement verified by: chest auscultation and capnometry   Measured from: lips  ETT/EBT  to lips (cm): 23  Number of attempts at approach: 1  Assessment: lips, teeth, and gum same as pre-op and atraumatic intubation

## 2025-03-03 LAB
ALBUMIN SERPL-MCNC: 3.9 G/DL (ref 3.5–5.2)
ALBUMIN/GLOB SERPL: 1.3 G/DL
ALP SERPL-CCNC: 59 U/L (ref 39–117)
ALT SERPL W P-5'-P-CCNC: 16 U/L (ref 1–41)
ANION GAP SERPL CALCULATED.3IONS-SCNC: 20.3 MMOL/L (ref 5–15)
AST SERPL-CCNC: 37 U/L (ref 1–40)
BASOPHILS # BLD AUTO: 0.02 10*3/MM3 (ref 0–0.2)
BASOPHILS NFR BLD AUTO: 0.2 % (ref 0–1.5)
BILIRUB SERPL-MCNC: 0.9 MG/DL (ref 0–1.2)
BUN SERPL-MCNC: 10 MG/DL (ref 8–23)
BUN/CREAT SERPL: 11.8 (ref 7–25)
CALCIUM SPEC-SCNC: 9.5 MG/DL (ref 8.6–10.5)
CHLORIDE SERPL-SCNC: 102 MMOL/L (ref 98–107)
CHOLEST SERPL-MCNC: 181 MG/DL (ref 0–200)
CO2 SERPL-SCNC: 22.7 MMOL/L (ref 22–29)
CREAT SERPL-MCNC: 0.85 MG/DL (ref 0.76–1.27)
DEPRECATED RDW RBC AUTO: 67.2 FL (ref 37–54)
EGFRCR SERPLBLD CKD-EPI 2021: 84.6 ML/MIN/1.73
EOSINOPHIL # BLD AUTO: 0.02 10*3/MM3 (ref 0–0.4)
EOSINOPHIL NFR BLD AUTO: 0.2 % (ref 0.3–6.2)
ERYTHROCYTE [DISTWIDTH] IN BLOOD BY AUTOMATED COUNT: 17.3 % (ref 12.3–15.4)
GLOBULIN UR ELPH-MCNC: 3.1 GM/DL
GLUCOSE SERPL-MCNC: 129 MG/DL (ref 65–99)
HBA1C MFR BLD: 5.8 % (ref 4.8–5.6)
HCT VFR BLD AUTO: 50.4 % (ref 37.5–51)
HDLC SERPL-MCNC: 54 MG/DL (ref 40–60)
HGB BLD-MCNC: 17.4 G/DL (ref 13–17.7)
IMM GRANULOCYTES # BLD AUTO: 0.03 10*3/MM3 (ref 0–0.05)
IMM GRANULOCYTES NFR BLD AUTO: 0.3 % (ref 0–0.5)
LDLC SERPL CALC-MCNC: 115 MG/DL (ref 0–100)
LDLC/HDLC SERPL: 2.11 {RATIO}
LYMPHOCYTES # BLD AUTO: 1.34 10*3/MM3 (ref 0.7–3.1)
LYMPHOCYTES NFR BLD AUTO: 12.4 % (ref 19.6–45.3)
MAGNESIUM SERPL-MCNC: 2.1 MG/DL (ref 1.6–2.4)
MCH RBC QN AUTO: 36.4 PG (ref 26.6–33)
MCHC RBC AUTO-ENTMCNC: 34.5 G/DL (ref 31.5–35.7)
MCV RBC AUTO: 105.4 FL (ref 79–97)
MONOCYTES # BLD AUTO: 0.99 10*3/MM3 (ref 0.1–0.9)
MONOCYTES NFR BLD AUTO: 9.1 % (ref 5–12)
NEUTROPHILS NFR BLD AUTO: 77.8 % (ref 42.7–76)
NEUTROPHILS NFR BLD AUTO: 8.44 10*3/MM3 (ref 1.7–7)
NT-PROBNP SERPL-MCNC: 408 PG/ML (ref 0–1800)
PLATELET # BLD AUTO: 189 10*3/MM3 (ref 140–450)
PMV BLD AUTO: 10.4 FL (ref 6–12)
POTASSIUM SERPL-SCNC: 4 MMOL/L (ref 3.5–5.2)
POTASSIUM SERPL-SCNC: 4.2 MMOL/L (ref 3.5–5.2)
PROT SERPL-MCNC: 7 G/DL (ref 6–8.5)
QT INTERVAL: 383 MS
QTC INTERVAL: 474 MS
RBC # BLD AUTO: 4.78 10*6/MM3 (ref 4.14–5.8)
SODIUM SERPL-SCNC: 145 MMOL/L (ref 136–145)
TRIGL SERPL-MCNC: 65 MG/DL (ref 0–150)
TSH SERPL DL<=0.05 MIU/L-ACNC: 1.06 UIU/ML (ref 0.27–4.2)
VLDLC SERPL-MCNC: 12 MG/DL (ref 5–40)
WBC NRBC COR # BLD AUTO: 10.84 10*3/MM3 (ref 3.4–10.8)

## 2025-03-03 PROCEDURE — 85025 COMPLETE CBC W/AUTO DIFF WBC: CPT | Performed by: HOSPITALIST

## 2025-03-03 PROCEDURE — 83880 ASSAY OF NATRIURETIC PEPTIDE: CPT | Performed by: HOSPITALIST

## 2025-03-03 PROCEDURE — 93010 ELECTROCARDIOGRAM REPORT: CPT | Performed by: INTERNAL MEDICINE

## 2025-03-03 PROCEDURE — 25010000002 ONDANSETRON PER 1 MG: Performed by: SURGERY

## 2025-03-03 PROCEDURE — 84132 ASSAY OF SERUM POTASSIUM: CPT | Performed by: HOSPITALIST

## 2025-03-03 PROCEDURE — 97165 OT EVAL LOW COMPLEX 30 MIN: CPT

## 2025-03-03 PROCEDURE — 97161 PT EVAL LOW COMPLEX 20 MIN: CPT | Performed by: PHYSICAL THERAPIST

## 2025-03-03 PROCEDURE — 84443 ASSAY THYROID STIM HORMONE: CPT | Performed by: HOSPITALIST

## 2025-03-03 PROCEDURE — 83735 ASSAY OF MAGNESIUM: CPT | Performed by: HOSPITALIST

## 2025-03-03 PROCEDURE — 97110 THERAPEUTIC EXERCISES: CPT | Performed by: PHYSICAL THERAPIST

## 2025-03-03 PROCEDURE — 80061 LIPID PANEL: CPT | Performed by: HOSPITALIST

## 2025-03-03 PROCEDURE — 93005 ELECTROCARDIOGRAM TRACING: CPT | Performed by: HOSPITALIST

## 2025-03-03 PROCEDURE — 80053 COMPREHEN METABOLIC PANEL: CPT | Performed by: HOSPITALIST

## 2025-03-03 PROCEDURE — 83036 HEMOGLOBIN GLYCOSYLATED A1C: CPT | Performed by: HOSPITALIST

## 2025-03-03 PROCEDURE — 99024 POSTOP FOLLOW-UP VISIT: CPT | Performed by: SURGERY

## 2025-03-03 RX ORDER — SIMETHICONE 80 MG
80 TABLET,CHEWABLE ORAL 4 TIMES DAILY PRN
Status: DISCONTINUED | OUTPATIENT
Start: 2025-03-03 | End: 2025-03-04

## 2025-03-03 RX ADMIN — PANTOPRAZOLE SODIUM 40 MG: 40 TABLET, DELAYED RELEASE ORAL at 16:54

## 2025-03-03 RX ADMIN — SACUBITRIL AND VALSARTAN 1 TABLET: 24; 26 TABLET, FILM COATED ORAL at 21:17

## 2025-03-03 RX ADMIN — ACETAMINOPHEN 325MG 650 MG: 325 TABLET ORAL at 16:54

## 2025-03-03 RX ADMIN — METOPROLOL TARTRATE 50 MG: 50 TABLET, FILM COATED ORAL at 13:29

## 2025-03-03 RX ADMIN — TAMSULOSIN HYDROCHLORIDE 0.4 MG: 0.4 CAPSULE ORAL at 08:29

## 2025-03-03 RX ADMIN — PANTOPRAZOLE SODIUM 40 MG: 40 TABLET, DELAYED RELEASE ORAL at 06:36

## 2025-03-03 RX ADMIN — MAGNESIUM OXIDE TAB 400 MG (241.3 MG ELEMENTAL MG) 400 MG: 400 (241.3 MG) TAB at 08:29

## 2025-03-03 RX ADMIN — Medication 12.5 MG: at 08:29

## 2025-03-03 RX ADMIN — FUROSEMIDE 20 MG: 20 TABLET ORAL at 08:29

## 2025-03-03 RX ADMIN — SACUBITRIL AND VALSARTAN 1 TABLET: 24; 26 TABLET, FILM COATED ORAL at 08:29

## 2025-03-03 RX ADMIN — ALPRAZOLAM 0.5 MG: 0.5 TABLET ORAL at 13:29

## 2025-03-03 RX ADMIN — TAMSULOSIN HYDROCHLORIDE 0.4 MG: 0.4 CAPSULE ORAL at 21:17

## 2025-03-03 RX ADMIN — METOPROLOL TARTRATE 50 MG: 50 TABLET, FILM COATED ORAL at 04:30

## 2025-03-03 RX ADMIN — FINASTERIDE 5 MG: 5 TABLET, FILM COATED ORAL at 08:29

## 2025-03-03 RX ADMIN — ONDANSETRON 4 MG: 2 INJECTION, SOLUTION INTRAMUSCULAR; INTRAVENOUS at 14:36

## 2025-03-03 RX ADMIN — DOCUSATE SODIUM 100 MG: 100 CAPSULE, LIQUID FILLED ORAL at 08:29

## 2025-03-03 RX ADMIN — ALPRAZOLAM 0.5 MG: 0.5 TABLET ORAL at 21:17

## 2025-03-03 RX ADMIN — PRAVASTATIN SODIUM 10 MG: 10 TABLET ORAL at 08:32

## 2025-03-03 RX ADMIN — ALPRAZOLAM 0.5 MG: 0.5 TABLET ORAL at 04:30

## 2025-03-03 RX ADMIN — SIMETHICONE 80 MG: 80 TABLET, CHEWABLE ORAL at 13:30

## 2025-03-03 RX ADMIN — METOPROLOL TARTRATE 50 MG: 50 TABLET, FILM COATED ORAL at 21:18

## 2025-03-03 NOTE — PROGRESS NOTES
"Daily progress note    Primary care physician  Dr. Wero Chatman    Subjective  Awake and alert and doing same and developed dyspepsia last night which resolved with symptomatic treatment.  Patient tolerating liquid diet but no flatus or BM yet.  Patient family at bedside      History of present illness  84-year-old white male with history of inguinal hernia cardiomyopathy hypertension hyperlipidemia presented to [facility] emergency room with abdominal pain and right groin pain.  Patient workup in ER revealed incarcerated inguinal hernia and partial small bowel obstruction and taken to the OR by surgery and underwent laparoscopic robotic assisted incarcerated right inguinal hernia repair with mesh and feeling much better this morning but still hurting.  Patient tolerating liquid diet.  Patient denies any nausea vomiting diarrhea.  Patient able to give me good history and family also contributed.     REVIEW OF SYSTEMS  Constitutional:  Negative for chills and fever.   Respiratory:  Negative for shortness of breath.    Cardiovascular:  Negative for chest pain.   Gastrointestinal:  Positive for abdominal pain. Negative for diarrhea, nausea and vomiting.   Genitourinary:  Negative for difficulty urinating.   All systems reviewed and negative except for those discussed in HPI.      PHYSICAL EXAM   Blood pressure 111/63, pulse 85, temperature 98.8 °F (37.1 °C), temperature source Oral, resp. rate 16, height 180.3 cm (71\"), weight 86.2 kg (190 lb), SpO2 95%.    GENERAL: Alert alert, no acute distress  SKIN: Warm, dry  HENT: Normocephalic, atraumatic  EYES: no scleral icterus  CV: regular rhythm, regular rate  RESPIRATORY: normal effort, moving air bilaterally  ABDOMEN: soft, diffuse tenderness bowel sounds positive  MUSCULOSKELETAL: no deformity  NEURO: alert, moves all extremities, follows commands     LAB RESULTS  Lab Results (last 24 hours)       Procedure Component Value Units Date/Time    Comprehensive " Metabolic Panel [[id number]]  (Abnormal) Collected: 03/03/25 0553    Specimen: Blood from Arm, Right Updated: 03/03/25 0947     Glucose 129 mg/dL      BUN 10 mg/dL      Creatinine 0.85 mg/dL      Sodium 145 mmol/L      Potassium 4.2 mmol/L      Comment: Specimen hemolyzed.  Result may be falsely elevated.        Chloride 102 mmol/L      CO2 22.7 mmol/L      Calcium 9.5 mg/dL      Total Protein 7.0 g/dL      Albumin 3.9 g/dL      ALT (SGPT) 16 U/L      Comment: Specimen hemolyzed.  Result may  be falsely elevated.        AST (SGOT) 37 U/L      Comment: Specimen hemolyzed.  Result may be falsely elevated.        Alkaline Phosphatase 59 U/L      Total Bilirubin 0.9 mg/dL      Globulin 3.1 gm/dL      A/G Ratio 1.3 g/dL      BUN/Creatinine Ratio 11.8     Anion Gap 20.3 mmol/L      eGFR 84.6 mL/min/1.73     Narrative:      GFR Categories in Chronic Kidney Disease (CKD)      GFR Category          GFR (mL/min/1.73)    Interpretation  G1                     90 or greater         Normal or high (1)  G2                      60-89                Mild decrease (1)  G3a                   45-59                Mild to moderate decrease  G3b                   30-44                Moderate to severe decrease  G4                    15-29                Severe decrease  G5                    14 or less           Kidney failure          (1)In the absence of evidence of kidney disease, neither GFR category G1 or G2 fulfill the criteria for CKD.    eGFR calculation 2021 CKD-EPI creatinine equation, which does not include race as a factor    Lipid Panel [[id number]]  (Abnormal) Collected: 03/03/25 0553    Specimen: Blood from Arm, Right Updated: 03/03/25 0944     Total Cholesterol 181 mg/dL      Triglycerides 65 mg/dL      HDL Cholesterol 54 mg/dL      LDL Cholesterol  115 mg/dL      VLDL Cholesterol 12 mg/dL      LDL/HDL Ratio 2.11    Narrative:      Cholesterol Reference Ranges  (U.S. Department of Health and Human Services ATP III  Classifications)    Desirable          <200 mg/dL  Borderline High    200-239 mg/dL  High Risk          >240 mg/dL      Triglyceride Reference Ranges  (U.S. Department of Health and Human Services ATP III Classifications)    Normal           <150 mg/dL  Borderline High  150-199 mg/dL  High             200-499 mg/dL  Very High        >500 mg/dL    HDL Reference Ranges  (U.S. Department of Health and Human Services ATP III Classifications)    Low     <40 mg/dl (major risk factor for CHD)  High    >60 mg/dl ('negative' risk factor for CHD)        LDL Reference Ranges  (U.S. Department of Health and Human Services ATP III Classifications)    Optimal          <100 mg/dL  Near Optimal     100-129 mg/dL  Borderline High  130-159 mg/dL  High             160-189 mg/dL  Very High        >189 mg/dL    LDL is calculated using the NIH LDL-C calculation.      BNP [[id number]]  (Normal) Collected: 03/03/25 0553    Specimen: Blood from Arm, Right Updated: 03/03/25 0921     proBNP 408.0 pg/mL     Narrative:      This assay is used as an aid in the diagnosis of individuals suspected of having heart failure. It can be used as an aid in the diagnosis of acute decompensated heart failure (ADHF) in patients presenting with signs and symptoms of ADHF to the emergency department (ED). In addition, NT-proBNP of <300 pg/mL indicates ADHF is not likely.    Age Range Result Interpretation  NT-proBNP Concentration (pg/mL:      <50             Positive            >450                   Gray                 300-450                    Negative             <300    50-75           Positive            >900                  Gray                300-900                  Negative            <300      >75             Positive            >1800                  Gray                300-1800                  Negative            <300    TSH [[id number]]  (Normal) Collected: 03/03/25 0553    Specimen: Blood from Arm, Right Updated: 03/03/25 0921     TSH 1.060  uIU/mL     Hemoglobin A1c [[id number]]  (Abnormal) Collected: 03/03/25 0553    Specimen: Blood from Arm, Right Updated: 03/03/25 0721     Hemoglobin A1C 5.80 %     Narrative:      Hemoglobin A1C Ranges:    Increased Risk for Diabetes  5.7% to 6.4%  Diabetes                     >= 6.5%  Diabetic Goal                < 7.0%    CBC & Differential [[id number]]  (Abnormal) Collected: 03/03/25 0553    Specimen: Blood from Arm, Right Updated: 03/03/25 0721    Narrative:      The following orders were created for panel order CBC & Differential.  Procedure                               Abnormality         Status                     ---------                               -----------         ------                     CBC Auto Differential[[id number]]        Abnormal            Final result                 Please view results for these tests on the individual orders.    CBC Auto Differential [[id number]]  (Abnormal) Collected: 03/03/25 0553    Specimen: Blood from Arm, Right Updated: 03/03/25 0721     WBC 10.84 10*3/mm3      RBC 4.78 10*6/mm3      Hemoglobin 17.4 g/dL      Hematocrit 50.4 %      .4 fL      MCH 36.4 pg      MCHC 34.5 g/dL      RDW 17.3 %      RDW-SD 67.2 fl      MPV 10.4 fL      Platelets 189 10*3/mm3      Neutrophil % 77.8 %      Lymphocyte % 12.4 %      Monocyte % 9.1 %      Eosinophil % 0.2 %      Basophil % 0.2 %      Immature Grans % 0.3 %      Neutrophils, Absolute 8.44 10*3/mm3      Lymphocytes, Absolute 1.34 10*3/mm3      Monocytes, Absolute 0.99 10*3/mm3      Eosinophils, Absolute 0.02 10*3/mm3      Basophils, Absolute 0.02 10*3/mm3      Immature Grans, Absolute 0.03 10*3/mm3           Imaging Results (Last 24 Hours)       ** No results found for the last 24 hours. **            Current Facility-Administered Medications:     acetaminophen (TYLENOL) tablet 650 mg, 650 mg, Oral, Q4H PRN, 650 mg at 03/02/25 1429 **OR** [DISCONTINUED] acetaminophen (TYLENOL) 160 MG/5ML oral solution 650 mg, 650 mg,  Oral, Q4H PRN **OR** [DISCONTINUED] acetaminophen (TYLENOL) suppository 650 mg, 650 mg, Rectal, Q4H PRN, Latia Santillan MD    ALPRAZolam (XANAX) tablet 0.5 mg, 0.5 mg, Oral, Q8H, Jayro Ambrose MD, 0.5 mg at 03/03/25 1329    calcium carbonate (TUMS) chewable tablet 500 mg (200 mg elemental), 2 tablet, Oral, TID PRN, Jayro Ambrose MD, 2 tablet at 03/02/25 2212    docusate sodium (COLACE) capsule 100 mg, 100 mg, Oral, BID, Latia Santillan MD, 100 mg at 03/03/25 0829    finasteride (PROSCAR) tablet 5 mg, 5 mg, Oral, Daily, Latia Santillan MD, 5 mg at 03/03/25 0829    furosemide (LASIX) tablet 20 mg, 20 mg, Oral, BID, Latia Santillan MD, 20 mg at 03/03/25 0829    magnesium oxide (MAG-OX) tablet 400 mg, 400 mg, Oral, Daily, Latia Santillan MD, 400 mg at 03/03/25 0829    metoprolol tartrate (LOPRESSOR) tablet 50 mg, 50 mg, Oral, Q8H, Jayro Ambrose MD, 50 mg at 03/03/25 1329    ondansetron (ZOFRAN) injection 4 mg, 4 mg, Intravenous, Q6H PRN, Latia Santillan MD    oxyCODONE (ROXICODONE) immediate release tablet 5 mg, 5 mg, Oral, Q4H PRN, Latia Santillan MD, 5 mg at 03/02/25 0429    pantoprazole (PROTONIX) EC tablet 40 mg, 40 mg, Oral, BID AC, Jayro Ambrose MD, 40 mg at 03/03/25 0636    pravastatin (PRAVACHOL) tablet 10 mg, 10 mg, Oral, Daily, Latia Santillan MD, 10 mg at 03/03/25 0832    sacubitril-valsartan (ENTRESTO) 24-26 MG tablet 1 tablet, 1 tablet, Oral, BID, Latia Santillan MD, 1 tablet at 03/03/25 0829    simethicone (MYLICON) chewable tablet 80 mg, 80 mg, Oral, 4x Daily PRN, Latia Santillan MD, 80 mg at 03/03/25 1330    [COMPLETED] Insert Peripheral IV, , , Once **AND** sodium chloride 0.9 % flush 10 mL, 10 mL, Intravenous, PRN, Jayro Ambrose MD    sodium chloride 0.9 % flush 10 mL, 10 mL, Intravenous, PRN, Latia Santillan MD    sodium chloride 0.9 % infusion 40 mL, 40 mL, Intravenous, PRN, Latia Santillan MD    spironolactone (ALDACTONE) half tablet 12.5 mg, 12.5 mg,  Oral, Daily, Latia Santillan MD, 12.5 mg at 03/03/25 0829    tamsulosin (FLOMAX) 24 hr capsule 0.4 mg, 0.4 mg, Oral, BID, Latia Santillan MD, 0.4 mg at 03/03/25 0829     ASSESSMENT  Incarcerated right inguinal hernia with partial small bowel obstruction  Status post laparoscopic robotic assisted repair of incarcerated right inguinal hernia  Dilated nonischemic cardiomyopathy status post AICD  Hypertension  Hyperlipidemia  BPH   Anxiety disorder  Gastroesophageal reflux disease    PLAN  CPM  Postop care  Clear liquid diet and advance as tolerated   Awaiting bowels tolerated  General Surgery to follow.  Continue home medications  Stress ulcer DVT prophylaxis  Supportive care  PT OT  Discussed with family nursing staff  Follow closely further recommendation current hospital course    THUY ACEVEDO MD    Copied text in this note has been reviewed and is accurate as of 03/03/25

## 2025-03-03 NOTE — PROGRESS NOTES
"General Surgery  Progress Note    CC: Follow-up incarcerated right inguinal hernia    POD#1 laparoscopic robotic assisted repair of incarcerated right inguinal hernia with mesh    S: He developed abdominal distention, heartburn, early satiety, and nausea overnight.  He feels even worse this morning and has been unable to drink any water due to a sensation of fullness and heartburn.  I explained this is likely due to an ileus.  He has not passed any flatus or had a bowel movement since surgery.    O:/77 (BP Location: Right arm, Patient Position: Lying)   Pulse 94   Temp 98.8 °F (37.1 °C) (Oral)   Resp 16   Ht 180.3 cm (71\")   Wt 86.2 kg (190 lb)   SpO2 95%   BMI 26.50 kg/m²     GENERAL: alert, well appearing, and in no distress  HEENT: normocephalic, atraumatic, moist mucous membranes, clear sclerae   CHEST: clear to auscultation, no wheezes, rales or rhonchi, symmetric air entry  CARDIAC: regular rate and rhythm    ABDOMEN: More tense and distended today, incisions clean/dry/intact with glue, no palpable recurrent inguinal hernia, testicles are descended bilaterally and nontender  EXTREMITIES: no cyanosis, clubbing, or edema   SKIN: Warm and moist, no rashes    LABS  Results from last 7 days   Lab Units 03/03/25  0553 03/01/25 2025   WBC 10*3/mm3 10.84* 9.66   HEMOGLOBIN g/dL 17.4 16.2   HEMATOCRIT % 50.4 47.7   PLATELETS 10*3/mm3 189 163   MONOCYTES % %  --  9.2   EOSINOPHIL % %  --  0.0*     Results from last 7 days   Lab Units 03/03/25  0553 03/01/25 2025   SODIUM mmol/L 145 141   POTASSIUM mmol/L 4.2 4.0   CHLORIDE mmol/L 102 104   CO2 mmol/L 22.7 25.0   BUN mg/dL 10 16   CREATININE mg/dL 0.85 0.89   CALCIUM mg/dL 9.5 9.3   BILIRUBIN mg/dL 0.9 0.5   ALK PHOS U/L 59 63   ALT (SGPT) U/L 16 18   AST (SGOT) U/L 37 23   GLUCOSE mg/dL 129* 142*         A/P: 84 y.o. male POD#1 laparoscopic robotic assisted repair of incarcerated right inguinal hernia with mesh    He is exhibiting signs of a postoperative " adynamic ileus, not unexpectedly.  I encouraged him to start walking as much as he can tolerate and advised his family to bring in chewing gum or hard candy that he can suck on to elicit saliva, as this can sometimes resolve an ileus quicker.  He will not be ready for discharge to home until he has resumption of bowel function and can tolerate a regular diet.    Latia Santillan MD  General, Robotic, and Endoscopic Surgery  [facility]    [address], [address]  [place] [address]  P: [phone]  F: [phone]

## 2025-03-03 NOTE — PLAN OF CARE
Goal Outcome Evaluation:  Plan of Care Reviewed With: patient        Progress: no change  Outcome Evaluation: Pt alert and orient. On. Room air. Pain controlled with prn meds, c/o gas pains. Ambulation encouraged pt walked x4 today. Bowel sounds hypoactive. Belching, but unable to pass gas. On clear liquid diet. Possible d/c when pt able to pass gas or have a BM.

## 2025-03-03 NOTE — PLAN OF CARE
Goal Outcome Evaluation:   Patient A&Ox4 complain of heart burn regina was given. Surgery was updated via the phone vitals stable will continued to monitor.

## 2025-03-03 NOTE — THERAPY DISCHARGE NOTE
Acute Care - Occupational Therapy Discharge  [facility]    Patient Name: Harvey Leyva  : 1940    MRN: [id number]                              Today's Date: 3/3/2025       Admit Date: 3/1/2025    Visit Dx:     ICD-10-CM ICD-9-CM   1. Incarcerated inguinal hernia  K40.30 550.10   2. Partial small bowel obstruction  K56.600 560.9     Patient Active Problem List   Diagnosis    Cardiomyopathy, dilated, nonischemic    Ventricular tachycardia    Hyperlipidemia LDL goal <100    Incarcerated right inguinal hernia    PVCs (premature ventricular contractions)    ICD (implantable cardioverter-defibrillator) in place    Partial small bowel obstruction     Past Medical History:   Diagnosis Date    Basal cell carcinoma     Dilated cardiomyopathy     Diverticulosis     Hernia, inguinal     right    History of prostate disorder     Hyperlipidemia     Hypertension     Hypotension     due to drugs    ICD (implantable cardioverter-defibrillator) in place     2017, Medtronic dual chamber    PVCs (premature ventricular contractions)     Slow to wake up after anesthesia     with left inguinal hernia    Ventricular tachycardia      Past Surgical History:   Procedure Laterality Date    BACK SURGERY N/A     CARDIAC DEFIBRILLATOR PLACEMENT N/A 2009    Prophylactic AICD (Medtronic Sec[address] DR model # J682EZI, serial # [id number])-Dr. Barrett Michael    CARDIAC ELECTROPHYSIOLOGY PROCEDURE Left 2017    Procedure: Device Replacement   ICD GEN CHG MEDTRONIC;  Surgeon: Guido Michael MD;  Location: [facility] INVASIVE LOCATION;  Service:     CATARACT EXTRACTION Bilateral     COLONOSCOPY N/A 2010    Roel Clancy     FINE NEEDLE ASPIRATION N/A 2009    Pericardial fluid aspiration-Dr. Willian Quintanilla    INGUINAL HERNIA REPAIR Left     laparoscopic    INGUINAL HERNIA REPAIR Right 3/1/2025    Procedure: Laparoscopic robotic-assisted incarcerated right inguinal hernia repair with mesh;  Surgeon:  Latia Santillan MD;  Location: [facility] OR;  Service: Robotics - DaVinci;  Laterality: Right;    PROSTATE SURGERY      VEIN LIGATION AND STRIPPING        General Information       Row Name 03/03/25 1300          OT Time and Intention    Subjective Information complains of;pain  -HE     Document Type evaluation  -HE     Mode of Treatment individual therapy  -HE     Patient Effort excellent  -HE     Symptoms Noted During/After Treatment none  -HE       Row Name 03/03/25 1300          General Information    Patient Profile Reviewed yes  -HE     Prior Level of Function independent:  -HE     Existing Precautions/Restrictions other (see comments)  abdominal surgery  -HE     Barriers to Rehab none identified  -       Row Name 03/03/25 1300          Living Environment    People in Home spouse  -       Row Name 03/03/25 1300          Home Main Entrance    Number of Stairs, Main Entrance three  -HE     Stair Railings, Main Entrance railings safe and in good condition  -       Row Name 03/03/25 1300          Stairs Within Home, Primary    Stairs, Within Home, Primary basement  -     Number of Stairs, Within Home, Primary twelve  -HE     Stairs Comment, Within Home, Primary bedroom on main floor, does not have to access stairs  -       Row Name 03/03/25 1300          Cognition    Orientation Status (Cognition) oriented x 4  -       Row Name 03/03/25 1300          Safety Issues/Impairments Affecting Functional Mobility    Safety Issues Affecting Function (Mobility) ability to follow commands  -     Impairments Affecting Function (Mobility) pain  -HE               User Key  (r) = Recorded By, (t) = Taken By, (c) = Cosigned By      Initials Name Provider Type    HE Nissa Gonzalez OT Occupational Therapist                   Mobility/ADL's       Row Name 03/03/25 1302          Transfers    Transfers sit-stand transfer;stand-sit transfer;toilet transfer  -       Row Name 03/03/25 1302          Sit-Stand  Transfer    Sit-Stand [place] (Transfers) supervision  -     Assistive Device (Sit-Stand Transfers) walker, front-wheeled  -HE       Row Name 03/03/25 1302          Stand-Sit Transfer    Stand-Sit [place] (Transfers) supervision  -     Assistive Device (Stand-Sit Transfers) walker, front-wheeled  -HE     Comment, (Stand-Sit Transfer) pt received seated in recliner, comes to stand with supervision, however, prior to gait belt donned. pt able to maintain static standing while gait belt applied for safety  -       Row Name 03/03/25 1302          Toilet Transfer    Type (Toilet Transfer) sit-stand;stand-sit  -     [place] Level (Toilet Transfer) contact guard  -     Assistive Device (Toilet Transfer) grab bars/safety frame  -     Comment, (Toilet Transfer) pt reports his toilet is higher at home, pt does require use of grab bars to safely return to standing  -       Row Name 03/03/25 1302          Functional Mobility    Functional Mobility- Ind. Level contact guard assist  -     Functional Mobility- Device walker, front-wheeled  -HE     Functional Mobility- Comment pt tolerates functional mobility approx.5 minutes within room and around nurses station, cues for RW safety with new device use  -HE     Patient was able to Ambulate yes  -               User Key  (r) = Recorded By, (t) = Taken By, (c) = Cosigned By      Initials Name Provider Type    Nissa Andujar OT Occupational Therapist                   Obj/Interventions       Row Name 03/03/25 1306          Sensory Assessment (Somatosensory)    Sensory Assessment (Somatosensory) sensation intact  -       Row Name 03/03/25 1306          Vision Assessment/Intervention    Visual Impairment/Limitations WFL  -       Row Name 03/03/25 1306          Range of Motion Comprehensive    General Range of Motion no range of motion deficits identified  -     Comment, General Range of Motion BUE WFL for session tasks  -       Row Name  03/03/25 1306          Strength Comprehensive (MMT)    General Manual Muscle Testing (MMT) Assessment no strength deficits identified  -HE       Row Name 03/03/25 1306          Balance    Balance Assessment sit to stand dynamic balance;standing dynamic balance  -HE     Comment, Balance mild instability with dynamic standing, no LOB, RW used with cues for safety to maintain floor contact  -HE               User Key  (r) = Recorded By, (t) = Taken By, (c) = Cosigned By      Initials Name Provider Type    Nissa Andujar OT Occupational Therapist                   Goals/Plan       Row Name 03/03/25 1313          Transfer Goal 1 (OT)    Activity/Assistive Device (Transfer Goal 1, OT) toilet  -HE     [place] Level/Cues Needed (Transfer Goal 1, OT) supervision required  -     Time Frame (Transfer Goal 1, OT) short term goal (STG);2 weeks  -               User Key  (r) = Recorded By, (t) = Taken By, (c) = Cosigned By      Initials Name Provider Type    Nissa Andujar OT Occupational Therapist                   Clinical Impression       Row Name 03/03/25 1308          Pain Assessment    Pretreatment Pain Rating 4/10  -HE     Posttreatment Pain Rating 4/10  -HE     Pain Location abdomen  -HE     Pain Management Interventions movement retraining implemented;activity modification encouraged  -       Row Name 03/03/25 1308          Plan of Care Review    Plan of Care Reviewed With patient;spouse;family  -HE     Progress no change  -HE     Outcome Evaluation The pt was admitted to [facility] 2/2 to hernia pain and swelling (s/p POD 1 laparoscopic robotic assisted repair of incarcerated right inguinal hernia with mesh)  PMHx significant for inguinal hernia, cardiomyopathy, hypertension, hyperlipidemia. PTA, pt IND with ADLs living with spouse. Pt reports occasional use of cane for community distances, otherwise no AD. Pt able to complete toilet transfer, hallway mobility with assist of one this date with RW. Discussed  activity modification with shower chair vs standing shower as pt typically completes baths seated in tub. Pt felt to progress appropriately to safely discharge home once medically appropriate, encouraged continued OOB with assistance while in-house. No further IPOT services indicated at this time, will s/o. Evaluation only.  -       Row Name 03/03/25 1308          Therapy Assessment/Plan (OT)    Criteria for Skilled Therapeutic Interventions Met (OT) no;no problems identified which require skilled intervention  -     Therapy Frequency (OT) evaluation only  -       Row Name 03/03/25 1312          Therapy Plan Review/Discharge Plan (OT)    Equipment Needs Upon Discharge (OT) shower chair  -HE     Anticipated Discharge Disposition (OT) home  -       Row Name 03/03/25 1312          Positioning and Restraints    Pre-Treatment Position sitting in chair/recliner  -HE     Post Treatment Position chair  -HE     In Chair exit alarm on;call light within reach;with family/caregiver  -HE               User Key  (r) = Recorded By, (t) = Taken By, (c) = Cosigned By      Initials Name Provider Type    HE Nissa Gonzalez, OT Occupational Therapist                   Outcome Measures       Row Name 03/03/25 1314          How much help from another is currently needed...    Putting on and taking off regular lower body clothing? 3  -HE     Bathing (including washing, rinsing, and drying) 3  -HE     Toileting (which includes using toilet bed pan or urinal) 3  -HE     Putting on and taking off regular upper body clothing 4  -HE     Taking care of personal grooming (such as brushing teeth) 4  -HE     Eating meals 4  -HE     AM-PAC 6 Clicks Score (OT) 21  -HE       Row Name 03/03/25 1246          How much help from another person do you currently need...    Turning from your back to your side while in flat bed without using bedrails? 4  -KH     Moving from lying on back to sitting on the side of a flat bed without bedrails? 3  -KH      Moving to and from a bed to a chair (including a wheelchair)? 4  -KH     Standing up from a chair using your arms (e.g., wheelchair, bedside chair)? 4  -KH     Climbing 3-5 steps with a railing? 3  -KH     To walk in hospital room? 4  -KH     AM-PAC 6 Clicks Score (PT) 22  -KH     Highest Level of Mobility Goal 7 --> Walk 25 feet or more  -       Row Name 03/03/25 1314          Modified Melanie Scale    Modified Reynolds Scale 2 - Slight disability.  Unable to carry out all previous activities but able to look after own affairs without assistance.  -       Row Name 03/03/25 1314 03/03/25 1246       Functional Assessment    Outcome Measure Options AM-PAC 6 Clicks Daily Activity (OT);Modified Melanie  -HE AM-PAC 6 Clicks Basic Mobility (PT)  -              User Key  (r) = Recorded By, (t) = Taken By, (c) = Cosigned By      Initials Name Provider Type    Magalys Stafford, PT Physical Therapist    Nissa Andujar OT Occupational Therapist                  Occupational Therapy Education       Title: PT OT SLP Therapies (Done)       Topic: Occupational Therapy (Done)       Point: ADL training (Done)       Description:   Instruct learner(s) on proper safety adaptation and remediation techniques during self care or transfers.   Instruct in proper use of assistive devices.                  Learning Progress Summary            Patient Acceptance, E, VU by  at 3/3/2025 1319    Comment: Pt/spouse/family educated regarding role of OT, discharge recommendations including equipment and activity modification, continued OOB while adm.   Family Acceptance, E, VU by  at 3/3/2025 1319    Comment: Pt/spouse/family educated regarding role of OT, discharge recommendations including equipment and activity modification, continued OOB while adm.                      Point: Home exercise program (Done)       Description:   Instruct learner(s) on appropriate technique for monitoring, assisting and/or progressing  therapeutic exercises/activities.                  Learning Progress Summary            Patient Acceptance, E, VU by  at 3/3/2025 1319    Comment: Pt/spouse/family educated regarding role of OT, discharge recommendations including equipment and activity modification, continued OOB while adm.   Family Acceptance, E, VU by  at 3/3/2025 1319    Comment: Pt/spouse/family educated regarding role of OT, discharge recommendations including equipment and activity modification, continued OOB while adm.                      Point: Precautions (Done)       Description:   Instruct learner(s) on prescribed precautions during self-care and functional transfers.                  Learning Progress Summary            Patient Acceptance, E, VU by  at 3/3/2025 1319    Comment: Pt/spouse/family educated regarding role of OT, discharge recommendations including equipment and activity modification, continued OOB while adm.   Family Acceptance, E, VU by  at 3/3/2025 1319    Comment: Pt/spouse/family educated regarding role of OT, discharge recommendations including equipment and activity modification, continued OOB while adm.                      Point: Body mechanics (Done)       Description:   Instruct learner(s) on proper positioning and spine alignment during self-care, functional mobility activities and/or exercises.                  Learning Progress Summary            Patient Acceptance, E, VU by  at 3/3/2025 1319    Comment: Pt/spouse/family educated regarding role of OT, discharge recommendations including equipment and activity modification, continued OOB while adm.   Family Acceptance, E, VU by  at 3/3/2025 1319    Comment: Pt/spouse/family educated regarding role of OT, discharge recommendations including equipment and activity modification, continued OOB while adm.                                      User Key       Initials Effective Dates Name Provider Type Discipline     02/18/25 -  Nissa Gonzalez OT  Occupational Therapist OT                  OT Recommendation and Plan  Therapy Frequency (OT): evaluation only  Plan of Care Review  Plan of Care Reviewed With: patient, spouse, family  Progress: no change  Outcome Evaluation: The pt was admitted to [facility] 2/2 to hernia pain and swelling (s/p POD 1 laparoscopic robotic assisted repair of incarcerated right inguinal hernia with mesh)  PMHx significant for inguinal hernia, cardiomyopathy, hypertension, hyperlipidemia. PTA, pt IND with ADLs living with spouse. Pt reports occasional use of cane for community distances, otherwise no AD. Pt able to complete toilet transfer, hallway mobility with assist of one this date with RW. Discussed activity modification with shower chair vs standing shower as pt typically completes baths seated in tub. Pt felt to progress appropriately to safely discharge home once medically appropriate, encouraged continued OOB with assistance while in-house. No further IPOT services indicated at this time, will s/o. Evaluation only.  Plan of Care Reviewed With: patient, spouse, family  Outcome Evaluation: The pt was admitted to [facility] 2/2 to hernia pain and swelling (s/p POD 1 laparoscopic robotic assisted repair of incarcerated right inguinal hernia with mesh)  PMHx significant for inguinal hernia, cardiomyopathy, hypertension, hyperlipidemia. PTA, pt IND with ADLs living with spouse. Pt reports occasional use of cane for community distances, otherwise no AD. Pt able to complete toilet transfer, hallway mobility with assist of one this date with RW. Discussed activity modification with shower chair vs standing shower as pt typically completes baths seated in tub. Pt felt to progress appropriately to safely discharge home once medically appropriate, encouraged continued OOB with assistance while in-house. No further IPOT services indicated at this time, will s/o. Evaluation only.     Time Calculation:   Evaluation Complexity (OT)  Review Occupational  Profile/Medical/Therapy History Complexity: brief/low complexity  Assessment, Occupational Performance/Identification of Deficit Complexity: 1-3 performance deficits  Clinical Decision Making Complexity (OT): problem focused assessment/low complexity  Overall Complexity of Evaluation (OT): low complexity     Time Calculation- OT       Row Name 03/03/25 1320             Time Calculation- OT    OT Start Time 1045  -HE      OT Stop Time 1100  -HE      OT Time Calculation (min) 15 min  -HE         Untimed Charges    OT Eval/Re-eval Minutes 15  -HE         Total Minutes    Untimed Charges Total Minutes 15  -HE       Total Minutes 15  -HE                User Key  (r) = Recorded By, (t) = Taken By, (c) = Cosigned By      Initials Name Provider Type    HE Nissa Gonzalez OT Occupational Therapist                  Therapy Charges for Today       Code Description Service Date Service Provider Modifiers Qty    [id number]  OT EVAL LOW COMPLEXITY 2 3/3/2025 Nissa Gonzalez OT GO 1               OT Discharge Summary  Anticipated Discharge Disposition (OT): home    Nissa Gonzalez OT  3/3/2025

## 2025-03-03 NOTE — THERAPY EVALUATION
Patient Name: Harvey Leyva  : 1940    MRN: [id number]                              Today's Date: 3/3/2025       Admit Date: 3/1/2025    Visit Dx:     ICD-10-CM ICD-9-CM   1. Incarcerated inguinal hernia  K40.30 550.10   2. Partial small bowel obstruction  K56.600 560.9     Patient Active Problem List   Diagnosis    Cardiomyopathy, dilated, nonischemic    Ventricular tachycardia    Hyperlipidemia LDL goal <100    Incarcerated right inguinal hernia    PVCs (premature ventricular contractions)    ICD (implantable cardioverter-defibrillator) in place    Partial small bowel obstruction     Past Medical History:   Diagnosis Date    Basal cell carcinoma     Dilated cardiomyopathy     Diverticulosis     Hernia, inguinal     right    History of prostate disorder     Hyperlipidemia     Hypertension     Hypotension     due to drugs    ICD (implantable cardioverter-defibrillator) in place     2017, Medtronic dual chamber    PVCs (premature ventricular contractions)     Slow to wake up after anesthesia     with left inguinal hernia    Ventricular tachycardia      Past Surgical History:   Procedure Laterality Date    BACK SURGERY N/A     CARDIAC DEFIBRILLATOR PLACEMENT N/A 2009    Prophylactic AICD (Medtronic [address] DR model # H084SKM, serial # [id number])-Dr. Barrett Michael    CARDIAC ELECTROPHYSIOLOGY PROCEDURE Left 2017    Procedure: Device Replacement   ICD GEN CHG MEDTRONIC;  Surgeon: Guido Michael MD;  Location: [facility] INVASIVE LOCATION;  Service:     CATARACT EXTRACTION Bilateral     COLONOSCOPY N/A 2010    Roel Clancy     FINE NEEDLE ASPIRATION N/A 2009    Pericardial fluid aspiration-Dr. Willian Quintanilla    INGUINAL HERNIA REPAIR Left     laparoscopic    INGUINAL HERNIA REPAIR Right 3/1/2025    Procedure: Laparoscopic robotic-assisted incarcerated right inguinal hernia repair with mesh;  Surgeon: Latia Santillan MD;  Location: [facility] OR;  Service:  Robotics - Javi;  Laterality: Right;    PROSTATE SURGERY      VEIN LIGATION AND STRIPPING        General Information       Row Name 03/03/25 1242          Physical Therapy Time and Intention    Document Type evaluation  -     Mode of Treatment individual therapy  -       Row Name 03/03/25 1242          General Information    Patient Profile Reviewed yes  -     Prior Level of Function independent:  no AD at baseline  -     Existing Precautions/Restrictions no known precautions/restrictions  -     Barriers to Rehab none identified  -       Row Name 03/03/25 1242          Living Environment    People in Home spouse  -       Row Name 03/03/25 1242          Home Main Entrance    Number of Stairs, Main Entrance two  -       Row Name 03/03/25 1242          Cognition    Orientation Status (Cognition) oriented x 4  -       Row Name 03/03/25 1242          Safety Issues/Impairments Affecting Functional Mobility    Impairments Affecting Function (Mobility) pain  -               User Key  (r) = Recorded By, (t) = Taken By, (c) = Cosigned By      Initials Name Provider Type    Magalys Stafford, PT Physical Therapist                   Mobility       Row Name 03/03/25 1243          Bed Mobility    Comment, (Bed Mobility) Patient was up in chair when PT arrived  -       Row Name 03/03/25 1243          Sit-Stand Transfer    Sit-Stand G[place] (Transfers) standby assist  -       Row Name 03/03/25 1243          Gait/Stairs (Locomotion)    [place] Level (Gait) standby assist  -     Assistive Device (Gait) walker, front-wheeled  -     Distance in Feet (Gait) 165  -     Deviations/Abnormal Patterns (Gait) gait speed decreased  -               User Key  (r) = Recorded By, (t) = Taken By, (c) = Cosigned By      Initials Name Provider Type    Magalys Stafford PT Physical Therapist                   Obj/Interventions       Row Name 03/03/25 1243          Range of Motion  Comprehensive    Comment, General Range of Motion [facility]       Row Name 03/03/25 1243          Strength Comprehensive (MMT)    Comment, General Manual Muscle Testing (MMT) Assessment [facility]       Row Name 03/03/25 1243          Balance    Balance Assessment standing static balance;standing dynamic balance  -     Static Standing Balance supervision  -     Dynamic Standing Balance standby assist  -     Position/Device Used, Standing Balance walker, rolling  With and without rolling walker  -               User Key  (r) = Recorded By, (t) = Taken By, (c) = Cosigned By      Initials Name Provider Type    Magalys Stafford, PT Physical Therapist                   Goals/Plan    No documentation.                  Clinical Impression       Row Name 03/03/25 1244          Pain    Pretreatment Pain Rating 2/10  -     Posttreatment Pain Rating 3/10  -     Pain Location abdomen  -       Row Name 03/03/25 1244          Plan of Care Review    Plan of Care Reviewed With patient;spouse  -     Outcome Evaluation Patient presented to the hospital with nonreducible inguinal hernia and is now s/p laparoscopic hernia repair.  Patient reports he lives with his spouse and is independently mobile at baseline with no assistive device.  He was up in the chair when PT arrived and was agreeable to therapy.  Patient was able to stand independently.  He requested to walk with rolling walker and did so with CGA initially but progressed quickly to SBA.  Patient also ambulated the last 50 feet with no assistive device at therapist request.  Again he was able to complete this with standby assist.  Gait without the rolling walker was slower and slightly more guarded.  Patient appears to be at or near baseline and is safe to DC home when medically stable.  Patient is safe to ambulate with family to assist this is okay with nursing staff.  PT will sign off.  -       Row Name 03/03/25 1244          Therapy Assessment/Plan  (PT)    Patient/Family Therapy Goals Statement (PT) Return home to prior level of function  -     Criteria for Skilled Interventions Met (PT) no problems identified which require skilled intervention  -     Therapy Frequency (PT) evaluation only  -       Row Name 03/03/25 1244          Positioning and Restraints    Pre-Treatment Position sitting in chair/recliner  -     Post Treatment Position chair  -KH     In Chair reclined;call light within reach;encouraged to call for assist;exit alarm on;with family/caregiver  -               User Key  (r) = Recorded By, (t) = Taken By, (c) = Cosigned By      Initials Name Provider Type    Magalys Stafford, PT Physical Therapist                   Outcome Measures       Row Name 03/03/25 1246          How much help from another person do you currently need...    Turning from your back to your side while in flat bed without using bedrails? 4  -KH     Moving from lying on back to sitting on the side of a flat bed without bedrails? 3  -KH     Moving to and from a bed to a chair (including a wheelchair)? 4  -KH     Standing up from a chair using your arms (e.g., wheelchair, bedside chair)? 4  -KH     Climbing 3-5 steps with a railing? 3  -KH     To walk in hospital room? 4  -KH     AM-PAC 6 Clicks Score (PT) 22  -     Highest Level of Mobility Goal 7 --> Walk 25 feet or more  -       Row Name 03/03/25 1246          Functional Assessment    Outcome Measure Options AM-PAC 6 Clicks Basic Mobility (PT)  -               User Key  (r) = Recorded By, (t) = Taken By, (c) = Cosigned By      Initials Name Provider Type    Magalys Stafford, PT Physical Therapist                                 Physical Therapy Education        No education to display                  PT Recommendation and Plan     Outcome Evaluation: Patient presented to the hospital with nonreducible inguinal hernia and is now s/p laparoscopic hernia repair.  Patient reports he lives with  his spouse and is independently mobile at baseline with no assistive device.  He was up in the chair when PT arrived and was agreeable to therapy.  Patient was able to stand independently.  He requested to walk with rolling walker and did so with CGA initially but progressed quickly to SBA.  Patient also ambulated the last 50 feet with no assistive device at therapist request.  Again he was able to complete this with standby assist.  Gait without the rolling walker was slower and slightly more guarded.  Patient appears to be at or near baseline and is safe to DC home when medically stable.  Patient is safe to ambulate with family to assist this is okay with nursing staff.  PT will sign off.     Time Calculation:         PT Charges       Row Name 03/03/25 1246             Time Calculation    Start Time 1010  -KH      Stop Time 1029  -KH      Time Calculation (min) 19 min  -KH      PT Received On 03/03/25  -KH         Time Calculation- PT    Total Timed Code Minutes- PT 9 minute(s)  -KH         Timed Charges    83224 - PT Therapeutic Exercise Minutes 9  -KH         Total Minutes    Timed Charges Total Minutes 9  -KH       Total Minutes 9  -KH                User Key  (r) = Recorded By, (t) = Taken By, (c) = Cosigned By      Initials Name Provider Type    Magalys Stafford, PT Physical Therapist                  Therapy Charges for Today       Code Description Service Date Service Provider Modifiers Qty    [id number]  PT THER PROC EA 15 MIN 3/3/2025 Magalys Wallace, PT GP 1    [id number]  PT EVAL LOW COMPLEXITY 2 3/3/2025 Magalys Wallace, PT GP 1            PT G-Codes  Outcome Measure Options: AM-PAC 6 Clicks Basic Mobility (PT)  AM-PAC 6 Clicks Score (PT): 22  PT Discharge Summary  Anticipated Discharge Disposition (PT): home with assist    Magalys Wallace PT  3/3/2025

## 2025-03-03 NOTE — CASE MANAGEMENT/SOCIAL WORK
Discharge Planning Assessment  [facility]     Patient Name: Harvey Leyva  MRN: [id number]  Today's Date: 3/3/2025    Admit Date: 3/1/2025    Plan: Home   Discharge Needs Assessment       Row Name 03/03/25 1714       Living Environment    People in Home spouse    Current Living Arrangements home    Potentially Unsafe Housing Conditions none    Primary Care Provided by self    Provides Primary Care For no one    Family Caregiver if Needed none    Quality of Family Relationships helpful;involved;supportive       Resource/Environmental Concerns    Resource/Environmental Concerns none    Transportation Concerns none       Transition Planning    Patient/Family Anticipates Transition to home with family    Patient/Family Anticipated Services at Transition none    Transportation Anticipated family or friend will provide       Discharge Needs Assessment    Readmission Within the Last 30 Days no previous admission in last 30 days    Equipment Currently Used at Home none    Anticipated Changes Related to Illness none    Equipment Needed After Discharge none    Provided Post Acute Provider List? N/A    Provided Post Acute Provider Quality & Resource List? N/A                   Discharge Plan       Row Name 03/03/25 1714       Plan    Plan Home    Plan Comments CCP met with patient. Introduced self. Confirmed face sheet is accurate. Patients PCP is Wero Chatman. Enrolled into [facility]. Patient lives at home with spouse. Independent at home. PT/OT rec home. Patient has a cane at home. Plans home at discharge. Family to transport. CCP following.                  Continued Care and Services - Admitted Since 3/1/2025    No active coordination exists for this encounter.       Expected Discharge Date and Time       Expected Discharge Date Expected Discharge Time    Mar 4, 2025            Demographic Summary       Row Name 03/03/25 1713       General Information    Admission Type observation    Arrived From emergency department     Referral Source admission list    Reason for Consult discharge planning    Preferred Language English                   Functional Status       Row Name 03/03/25 171       Functional Status    Usual Activity Tolerance good    Current Activity Tolerance good       Functional Status, IADL    Medications independent    Meal Preparation independent    Housekeeping independent    Laundry independent    Shopping independent       Mental Status    General Appearance WDL WDL       Mental Status Summary    Recent Changes in Mental Status/Cognitive Functioning no changes       Employment/    Employment Status retired                   Psychosocial    No documentation.                  Abuse/Neglect    No documentation.                  Legal    No documentation.                  Substance Abuse    No documentation.                  Patient Forms    No documentation.

## 2025-03-03 NOTE — PLAN OF CARE
Goal Outcome Evaluation:  Plan of Care Reviewed With: patient, spouse, family        Progress: no change  Outcome Evaluation: The pt was admitted to [facility] 2/2 to hernia pain and swelling (s/p POD 1 laparoscopic robotic assisted repair of incarcerated right inguinal hernia with mesh)  PMHx significant for inguinal hernia, cardiomyopathy, hypertension, hyperlipidemia. PTA, pt IND with ADLs living with spouse. Pt reports occasional use of cane for community distances, otherwise no AD. Pt able to complete toilet transfer, hallway mobility with assist of one this date with RW. Discussed activity modification with shower chair vs standing shower as pt typically completes baths seated in tub. Pt felt to progress appropriately to safely discharge home once medically appropriate, encouraged continued OOB with assistance while in-house. No further IPOT services indicated at this time, will s/o. Evaluation only.    Anticipated Discharge Disposition (OT): home

## 2025-03-03 NOTE — PLAN OF CARE
Goal Outcome Evaluation:  Plan of Care Reviewed With: patient, spouse           Outcome Evaluation: Patient presented to the hospital with nonreducible inguinal hernia and is now s/p laparoscopic hernia repair.  Patient reports he lives with his spouse and is independently mobile at baseline with no assistive device.  He was up in the chair when PT arrived and was agreeable to therapy.  Patient was able to stand independently.  He requested to walk with rolling walker and did so with CGA initially but progressed quickly to SBA.  Patient also ambulated the last 50 feet with no assistive device at therapist request.  Again he was able to complete this with standby assist.  Gait without the rolling walker was slower and slightly more guarded.  Patient appears to be at or near baseline and is safe to DC home when medically stable.  Patient is safe to ambulate with family to assist this is okay with nursing staff.  PT will sign off.    Anticipated Discharge Disposition (PT): home with assist

## 2025-03-04 ENCOUNTER — READMISSION MANAGEMENT (OUTPATIENT)
Dept: CALL CENTER | Facility: HOSPITAL | Age: 85
End: 2025-03-04
Payer: MEDICARE

## 2025-03-04 VITALS
RESPIRATION RATE: 16 BRPM | TEMPERATURE: 98 F | HEART RATE: 76 BPM | BODY MASS INDEX: 26.6 KG/M2 | DIASTOLIC BLOOD PRESSURE: 61 MMHG | WEIGHT: 190 LBS | SYSTOLIC BLOOD PRESSURE: 109 MMHG | OXYGEN SATURATION: 91 % | HEIGHT: 71 IN

## 2025-03-04 LAB
ANION GAP SERPL CALCULATED.3IONS-SCNC: 11 MMOL/L (ref 5–15)
BASOPHILS # BLD AUTO: 0.03 10*3/MM3 (ref 0–0.2)
BASOPHILS NFR BLD AUTO: 0.4 % (ref 0–1.5)
BUN SERPL-MCNC: 17 MG/DL (ref 8–23)
BUN/CREAT SERPL: 16.3 (ref 7–25)
CALCIUM SPEC-SCNC: 8.8 MG/DL (ref 8.6–10.5)
CHLORIDE SERPL-SCNC: 101 MMOL/L (ref 98–107)
CO2 SERPL-SCNC: 28 MMOL/L (ref 22–29)
CREAT SERPL-MCNC: 1.04 MG/DL (ref 0.76–1.27)
DEPRECATED RDW RBC AUTO: 66.8 FL (ref 37–54)
EGFRCR SERPLBLD CKD-EPI 2021: 69.9 ML/MIN/1.73
EOSINOPHIL # BLD AUTO: 0.12 10*3/MM3 (ref 0–0.4)
EOSINOPHIL NFR BLD AUTO: 1.8 % (ref 0.3–6.2)
ERYTHROCYTE [DISTWIDTH] IN BLOOD BY AUTOMATED COUNT: 17.4 % (ref 12.3–15.4)
GLUCOSE SERPL-MCNC: 103 MG/DL (ref 65–99)
HCT VFR BLD AUTO: 43.1 % (ref 37.5–51)
HGB BLD-MCNC: 15.1 G/DL (ref 13–17.7)
IMM GRANULOCYTES # BLD AUTO: 0.01 10*3/MM3 (ref 0–0.05)
IMM GRANULOCYTES NFR BLD AUTO: 0.1 % (ref 0–0.5)
LYMPHOCYTES # BLD AUTO: 1.72 10*3/MM3 (ref 0.7–3.1)
LYMPHOCYTES NFR BLD AUTO: 25.4 % (ref 19.6–45.3)
MCH RBC QN AUTO: 36.9 PG (ref 26.6–33)
MCHC RBC AUTO-ENTMCNC: 35 G/DL (ref 31.5–35.7)
MCV RBC AUTO: 105.4 FL (ref 79–97)
MONOCYTES # BLD AUTO: 0.97 10*3/MM3 (ref 0.1–0.9)
MONOCYTES NFR BLD AUTO: 14.3 % (ref 5–12)
NEUTROPHILS NFR BLD AUTO: 3.91 10*3/MM3 (ref 1.7–7)
NEUTROPHILS NFR BLD AUTO: 58 % (ref 42.7–76)
PHOSPHATE SERPL-MCNC: 3 MG/DL (ref 2.5–4.5)
PLATELET # BLD AUTO: 151 10*3/MM3 (ref 140–450)
PMV BLD AUTO: 9.9 FL (ref 6–12)
POTASSIUM SERPL-SCNC: 4.1 MMOL/L (ref 3.5–5.2)
RBC # BLD AUTO: 4.09 10*6/MM3 (ref 4.14–5.8)
SODIUM SERPL-SCNC: 140 MMOL/L (ref 136–145)
WBC NRBC COR # BLD AUTO: 6.76 10*3/MM3 (ref 3.4–10.8)

## 2025-03-04 PROCEDURE — 99222 1ST HOSP IP/OBS MODERATE 55: CPT | Performed by: INTERNAL MEDICINE

## 2025-03-04 PROCEDURE — 85025 COMPLETE CBC W/AUTO DIFF WBC: CPT | Performed by: HOSPITALIST

## 2025-03-04 PROCEDURE — 84100 ASSAY OF PHOSPHORUS: CPT | Performed by: INTERNAL MEDICINE

## 2025-03-04 PROCEDURE — 99024 POSTOP FOLLOW-UP VISIT: CPT | Performed by: SURGERY

## 2025-03-04 PROCEDURE — 80048 BASIC METABOLIC PNL TOTAL CA: CPT | Performed by: HOSPITALIST

## 2025-03-04 RX ORDER — OXYCODONE HYDROCHLORIDE 5 MG/1
5 TABLET ORAL EVERY 4 HOURS PRN
Qty: 10 TABLET | Refills: 0 | Status: SHIPPED | OUTPATIENT
Start: 2025-03-04 | End: 2025-03-07

## 2025-03-04 RX ORDER — PSEUDOEPHEDRINE HCL 30 MG
100 TABLET ORAL 2 TIMES DAILY
Qty: 60 CAPSULE | Refills: 0 | Status: SHIPPED | OUTPATIENT
Start: 2025-03-04 | End: 2025-04-03

## 2025-03-04 RX ADMIN — PANTOPRAZOLE SODIUM 40 MG: 40 TABLET, DELAYED RELEASE ORAL at 06:26

## 2025-03-04 RX ADMIN — Medication 12.5 MG: at 09:47

## 2025-03-04 RX ADMIN — METOPROLOL TARTRATE 50 MG: 50 TABLET, FILM COATED ORAL at 06:27

## 2025-03-04 RX ADMIN — FUROSEMIDE 20 MG: 20 TABLET ORAL at 09:47

## 2025-03-04 RX ADMIN — FINASTERIDE 5 MG: 5 TABLET, FILM COATED ORAL at 09:46

## 2025-03-04 RX ADMIN — PRAVASTATIN SODIUM 10 MG: 10 TABLET ORAL at 09:46

## 2025-03-04 RX ADMIN — MAGNESIUM OXIDE TAB 400 MG (241.3 MG ELEMENTAL MG) 400 MG: 400 (241.3 MG) TAB at 09:47

## 2025-03-04 RX ADMIN — METOPROLOL TARTRATE 50 MG: 50 TABLET, FILM COATED ORAL at 12:07

## 2025-03-04 RX ADMIN — TAMSULOSIN HYDROCHLORIDE 0.4 MG: 0.4 CAPSULE ORAL at 09:46

## 2025-03-04 RX ADMIN — ALPRAZOLAM 0.5 MG: 0.5 TABLET ORAL at 06:27

## 2025-03-04 RX ADMIN — SACUBITRIL AND VALSARTAN 1 TABLET: 24; 26 TABLET, FILM COATED ORAL at 09:47

## 2025-03-04 RX ADMIN — ALPRAZOLAM 0.5 MG: 0.5 TABLET ORAL at 12:07

## 2025-03-04 NOTE — PLAN OF CARE
Problem: Adult Inpatient Plan of Care  Goal: Plan of Care Review  Flowsheets (Taken 3/4/2025 5754)  Progress: improving  Outcome Evaluation: abdomen soft slight distention with bowel sounds positive up to bath room had 3 liquid brown stools states feels better passing gas denies nausea no request for pain meds resting  Plan of Care Reviewed With: patient   Goal Outcome Evaluation:  Plan of Care Reviewed With: patient        Progress: improving  Outcome Evaluation: abdomen soft slight distention with bowel sounds positive up to bath room had 3 liquid brown stools states feels better passing gas denies nausea no request for pain meds resting

## 2025-03-04 NOTE — DISCHARGE SUMMARY
Discharge summary    Date of admission 3/1/2025  Date of discharge 3/4/2025    Final diagnosis  Incarcerated right inguinal hernia with partial small bowel obstruction  Status post laparoscopic robotic assisted repair of incarcerated right inguinal hernia  Postop ileus resolved  Dilated nonischemic cardiomyopathy status post AICD  Hypertension  Hyperlipidemia  BPH   Anxiety disorder  Gastroesophageal reflux disease    Discharge medications    Current Facility-Administered Medications:     ALPRAZolam (XANAX) tablet 0.5 mg, 0.5 mg, Oral, Q8H, Jayro Ambrose MD, 0.5 mg at 03/04/25 1207    docusate sodium (COLACE) capsule 100 mg, 100 mg, Oral, BID, Latia Santillan MD, 100 mg at 03/03/25 0829    finasteride (PROSCAR) tablet 5 mg, 5 mg, Oral, Daily, Latia Santillan MD, 5 mg at 03/04/25 0946    furosemide (LASIX) tablet 20 mg, 20 mg, Oral, BID, Latia Santillan MD, 20 mg at 03/04/25 0947    magnesium oxide (MAG-OX) tablet 400 mg, 400 mg, Oral, Daily, Latia Santillan MD, 400 mg at 03/04/25 0947    metoprolol tartrate (LOPRESSOR) tablet 50 mg, 50 mg, Oral, Q8H, Jayro Ambrose MD, 50 mg at 03/04/25 1207    oxyCODONE (ROXICODONE) immediate release tablet 5 mg, 5 mg, Oral, Q4H PRN, Latia Santillan MD, 5 mg at 03/02/25 0429    pantoprazole (PROTONIX) EC tablet 40 mg, 40 mg, Oral, BID AC, Jayro Ambrose MD, 40 mg at 03/04/25 0626    pravastatin (PRAVACHOL) tablet 10 mg, 10 mg, Oral, Daily, Latia Santillan MD, 10 mg at 03/04/25 0946    sacubitril-valsartan (ENTRESTO) 24-26 MG tablet 1 tablet, 1 tablet, Oral, BID, Latia Santillan MD, 1 tablet at 03/04/25 0947    [COMPLETED] Insert Peripheral IV, , , Once **AND** sodium chloride 0.9 % flush 10 mL, 10 mL, Intravenous, PRN, Jayro Ambrose MD    spironolactone (ALDACTONE) half tablet 12.5 mg, 12.5 mg, Oral, Daily, Latia Santillan MD, 12.5 mg at 03/04/25 0947    tamsulosin (FLOMAX) 24 hr capsule 0.4 mg, 0.4 mg, Oral, BID, Latia Santillan MD, 0.4 mg at  03/04/25 0946     Consults obtained  General Surgery    Procedures  Laparoscopic robotic assisted incarcerated right inguinal hernia repair with mesh    Hospital course  84-year-old white male with history of inguinal hernia cardiomyopathy hypertension hyperlipidemia admitted to emergency room with abdominal pain and right groin pain.  Patient workup in ER revealed incarcerated inguinal hernia with partial small bowel obstruction taken to the OR from ER and underwent laparoscopic robotic assisted hernia repair with mesh.  Postoperatively patient has ileus which resolved with bowel program and supportive care.  Patient started on liquid diet and advance as tolerated now tolerating regular diet and has had a BM this morning and cleared for discharge.    Discharge diet regular    Activity as tolerated    Medication as above    Follow-up with prime doctor in 1 week and follow-up with general surgery per the instructions and take medication as directed.    THUY ACEVEDO MD

## 2025-03-04 NOTE — CONSULTS
Patient Name: Harvey Leyva  :1940  84 y.o.    Date of Admission: 3/1/2025  Encounter Provider: Estelle Macias MD  Date of Encounter Visit: 25  Place of Service: [facility] CARDIOLOGY  Referring Provider: Jayro Ambrose MD  Patient Care Team:  Wero Chatman MD as PCP - General (Family Medicine)  Harvey Gaitan MD as Cardiologist (Cardiology)  Micheal Reagan MD as Cardiologist (Cardiology)      Chief complaint: severe right groin pain    History of Present Illness:    Harvey Leyva is a 84 year-old male who follows with Dr. Reagan in our office.  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  He has been found to have PVCs and nonsustained VT on ICD checks.    He was last seen in the office by Greta on 1/3/2024.  He reported at that time he experiences some lightheadedness with low blood pressures.  He denied chest pain, shortness of breath, PND, orthopnea, edema, syncope, or bleeding.    He presented to the emergency department on 3/1/2025 with complaints of acute onset severe right groin pain with nonreducible right inguinal hernia.  This hernia is chronic but has always been reducible.  He underwent a CT of the abdomen and pelvis that demonstrated evidence for an incarcerated right inguinal hernia with associated small bowel obstruction.  He went emergently to the OR with Dr. Santillan for laparoscopic robotic assisted repair of incarcerated right inguinal hernia with mesh.  Post op day 1 he developed a ileus.  Movement was recommended.    Yesterday, he had a single 10 beat run of ventricular tachycardia.  He was asymptomatic and his vitals remained stable.  His CMP was normal, mag was normal, TSH is normal, phosphorus is pending.  He does have a history of nonsustained ventricular tachycardia and has an ICD in place.    No chest pain, palpitations, presyncope or  syncope        Cardiology has been asked to see this patient for ventricular tachycardia.    ECHO 7/7/2022  There is akinesis of the basal to mid inferior wall. There is moderate hypokinesis of the basal to mid inferoseptum  Left ventricular ejection fraction appears to be 41 - 45%.  Left ventricular diastolic function is consistent with (grade I) impaired relaxation.  Normal right ventricular cavity size and systolic function noted.  The left atrial cavity is mild to moderately dilated.  Mild tricuspid valve regurgitation is present.  Calculated right ventricular systolic pressure from tricuspid regurgitation is 29 mmHg.  There is no evidence of pericardial effusion    Past Medical History:   Diagnosis Date    Basal cell carcinoma     Dilated cardiomyopathy     Diverticulosis     Hernia, inguinal     right    History of prostate disorder     Hyperlipidemia     Hypertension     Hypotension     due to drugs    ICD (implantable cardioverter-defibrillator) in place     2/2017, Medtronic dual chamber    PVCs (premature ventricular contractions)     Slow to wake up after anesthesia     with left inguinal hernia    Ventricular tachycardia        Past Surgical History:   Procedure Laterality Date    BACK SURGERY N/A 1986    CARDIAC DEFIBRILLATOR PLACEMENT N/A 07/02/2009    Prophylactic AICD (Medtronic Sec[address] DR model # T818BUU, serial # [id number])-Dr. Barrett Michael    CARDIAC ELECTROPHYSIOLOGY PROCEDURE Left 02/20/2017    Procedure: Device Replacement   ICD GEN CHG MEDTRONIC;  Surgeon: Guido Michael MD;  Location:  INVASIVE LOCATION;  Service:     CATARACT EXTRACTION Bilateral     COLONOSCOPY N/A 12/06/2010    Roel Clancy     FINE NEEDLE ASPIRATION N/A 11/29/2009    Pericardial fluid aspiration-Dr. Willian Quintanilla    INGUINAL HERNIA REPAIR Left 1984    laparoscopic    INGUINAL HERNIA REPAIR Right 3/1/2025    Procedure: Laparoscopic robotic-assisted incarcerated right inguinal hernia repair  with mesh;  Surgeon: Latia Santillan MD;  Location: [facility] MAIN OR;  Service: Robotics - DaVinci;  Laterality: Right;    PROSTATE SURGERY      VEIN LIGATION AND STRIPPING           Prior to Admission medications    Medication Sig Start Date End Date Taking? Authorizing Provider   metoprolol tartrate (LOPRESSOR) 50 MG tablet TAKE 1 TABLET BY MOUTH THREE TIMES A DAY 12/26/24  Yes Greta Ibrahim APRN   ALPRAZolam (XANAX) 0.5 MG tablet Take 1 tablet by mouth 3 (Three) Times a Day. 10/9/13   Giovanni Guerra MD   carboxymethylcellulose (REFRESH PLUS) 0.5 % solution Apply  to eye(s) as directed by provider 4 (Four) Times a Day.    Giovanni Guerra MD   esomeprazole (nexIUM) 20 MG capsule Take 1 capsule by mouth Every Morning Before Breakfast.    Giovanni Guerra MD   finasteride (PROSCAR) 5 MG tablet Take 1 tablet by mouth Daily.    Giovanni Guerra MD   furosemide (LASIX) 20 MG tablet TAKE 1 TABLET BY MOUTH TWICE A DAY 2/10/25   Greta Ibrahim APRN   magnesium oxide (MAGOX) 400 (241.3 Mg) MG tablet tablet Take 1 tablet by mouth Daily.    Giovanni Guerra MD   pravastatin (PRAVACHOL) 10 MG tablet Take 1 tablet by mouth Daily. 12/2/21   Giovanni Guerra MD   sacubitril-valsartan (Entresto) 24-26 MG tablet TAKE 1 TABLET BY MOUTH TWICE A DAY 2/10/25   Greta Ibrahim APRN   spironolactone (ALDACTONE) 25 MG tablet TAKE ONE-HALF (0.5) TABLET BY MOUTH DAILY 1/1/25   Greta Ibrahim APRN   tamsulosin (FLOMAX) 0.4 MG capsule 24 hr capsule Take 1 capsule by mouth 2 (Two) Times a Day. 7/22/12   Giovanni Guerra MD       No Known Allergies    Social History     Socioeconomic History    Marital status:    Tobacco Use    Smoking status: Former    Smokeless tobacco: Never   Vaping Use    Vaping status: Never Used   Substance and Sexual Activity    Alcohol use: No     Comment: caffeine use    Drug use: No    Sexual activity: Defer       Family History   Problem Relation Age of  Onset    Heart failure Mother     Prostate cancer Father     Heart disease Brother     Prostate cancer Brother     Malig Hyperthermia Neg Hx        REVIEW OF SYSTEMS:   All other systems reviewed and negative.        Objective:     Vitals:    03/03/25 2300 03/04/25 0720 03/04/25 0947 03/04/25 1125   BP: 107/58 91/56 103/61 109/61   BP Location: Right arm Right arm  Left arm   Patient Position: Lying Lying  Lying   Pulse: 85 84 74 76   Resp: 16 16  16   Temp: 97.5 °F (36.4 °C) 98.4 °F (36.9 °C)  98 °F (36.7 °C)   TempSrc: Oral Oral  Oral   SpO2:  92%  91%   Weight:       Height:         Body mass index is 26.5 kg/m².    Intake/Output Summary (Last 24 hours) at 3/4/2025 1320  Last data filed at 3/4/2025 0900  Gross per 24 hour   Intake 460 ml   Output 200 ml   Net 260 ml       General: Alert, no acute distress  Neck: No JVD  Cardiac: regular rate and rhythm. No murmurs.   Pulmonary: normal rate. No wheezes, crackles or rales. Clear to auscultation  Extremities: No edema.    Lab Review:     Results from last 7 days   Lab Units 03/04/25  0528 03/03/25  1418 03/03/25  0553   SODIUM mmol/L 140  --  145   POTASSIUM mmol/L 4.1   < > 4.2   CHLORIDE mmol/L 101  --  102   CO2 mmol/L 28.0  --  22.7   BUN mg/dL 17  --  10   CREATININE mg/dL 1.04  --  0.85   CALCIUM mg/dL 8.8  --  9.5   BILIRUBIN mg/dL  --   --  0.9   ALK PHOS U/L  --   --  59   ALT (SGPT) U/L  --   --  16   AST (SGOT) U/L  --   --  37   GLUCOSE mg/dL 103*  --  129*    < > = values in this interval not displayed.         Results from last 7 days   Lab Units 03/04/25  0528   WBC 10*3/mm3 6.76   HEMOGLOBIN g/dL 15.1   HEMATOCRIT % 43.1   PLATELETS 10*3/mm3 151         Results from last 7 days   Lab Units 03/03/25  1418   MAGNESIUM mg/dL 2.1     Results from last 7 days   Lab Units 03/03/25  0553   CHOLESTEROL mg/dL 181   TRIGLYCERIDES mg/dL 65   HDL CHOL mg/dL 54   LDL CHOL mg/dL 115*               I personally viewed and interpreted the patient's EKG/Telemetry  data.        Assessment and Plan:       1.  Nonischemic cardiomyopathy.  2.  Ventricular tachycardia/PVCs.  This has been noted on ICD checks in the past.  Noted on telemetry here.  3.  Hypertension  4.  Hyperlipidemia  5.  Dual-chamber pacemaker/ICD  6.  Incarcerated right inguinal hernia status post surgery.    Keep appointment on March 12, 2025 with ALYSON Souza for discharge from cardiac standpoint    Estelle Macias MD  03/04/25  13:20 EST

## 2025-03-04 NOTE — PROGRESS NOTES
"Daily progress note    Primary care physician  Dr. Wero Chatman    Subjective  Doing better with no new complaints.  Patient passing gas and has BM and tolerating regular diet.  Patient family at bedside.  Patient wants to go home.      History of present illness  84-year-old white male with history of inguinal hernia cardiomyopathy hypertension hyperlipidemia presented to [facility] emergency room with abdominal pain and right groin pain.  Patient workup in ER revealed incarcerated inguinal hernia and partial small bowel obstruction and taken to the OR by surgery and underwent laparoscopic robotic assisted incarcerated right inguinal hernia repair with mesh and feeling much better this morning but still hurting.  Patient tolerating liquid diet.  Patient denies any nausea vomiting diarrhea.  Patient able to give me good history and family also contributed.     REVIEW OF SYSTEMS  Unremarkable     PHYSICAL EXAM   Blood pressure 109/61, pulse 76, temperature 98 °F (36.7 °C), temperature source Oral, resp. rate 16, height 180.3 cm (71\"), weight 86.2 kg (190 lb), SpO2 91%.    GENERAL: Alert alert, no acute distress  SKIN: Warm, dry  HENT: Normocephalic, atraumatic  EYES: no scleral icterus  CV: regular rhythm, regular rate  RESPIRATORY: normal effort, moving air bilaterally  ABDOMEN: soft, diffuse tenderness bowel sounds positive  MUSCULOSKELETAL: no deformity  NEURO: alert, moves all extremities, follows commands     LAB RESULTS  Lab Results (last 24 hours)       Procedure Component Value Units Date/Time    Phosphorus [[id number]]  (Normal) Collected: 03/04/25 0528    Specimen: Blood Updated: 03/04/25 0810     Phosphorus 3.0 mg/dL     Basic Metabolic Panel [[id number]]  (Abnormal) Collected: 03/04/25 0528    Specimen: Blood Updated: 03/04/25 0653     Glucose 103 mg/dL      BUN 17 mg/dL      Creatinine 1.04 mg/dL      Sodium 140 mmol/L      Potassium 4.1 mmol/L      Chloride 101 mmol/L      CO2 28.0 mmol/L      " Calcium 8.8 mg/dL      BUN/Creatinine Ratio 16.3     Anion Gap 11.0 mmol/L      eGFR 69.9 mL/min/1.73     Narrative:      GFR Categories in Chronic Kidney Disease (CKD)      GFR Category          GFR (mL/min/1.73)    Interpretation  G1                     90 or greater         Normal or high (1)  G2                      60-89                Mild decrease (1)  G3a                   45-59                Mild to moderate decrease  G3b                   30-44                Moderate to severe decrease  G4                    15-29                Severe decrease  G5                    14 or less           Kidney failure          (1)In the absence of evidence of kidney disease, neither GFR category G1 or G2 fulfill the criteria for CKD.    eGFR calculation 2021 CKD-EPI creatinine equation, which does not include race as a factor    CBC & Differential [[id number]]  (Abnormal) Collected: 03/04/25 0528    Specimen: Blood Updated: 03/04/25 0631    Narrative:      The following orders were created for panel order CBC & Differential.  Procedure                               Abnormality         Status                     ---------                               -----------         ------                     CBC Auto Differential[[id number]]        Abnormal            Final result                 Please view results for these tests on the individual orders.    CBC Auto Differential [[id number]]  (Abnormal) Collected: 03/04/25 0528    Specimen: Blood Updated: 03/04/25 0631     WBC 6.76 10*3/mm3      RBC 4.09 10*6/mm3      Hemoglobin 15.1 g/dL      Hematocrit 43.1 %      .4 fL      MCH 36.9 pg      MCHC 35.0 g/dL      RDW 17.4 %      RDW-SD 66.8 fl      MPV 9.9 fL      Platelets 151 10*3/mm3      Neutrophil % 58.0 %      Lymphocyte % 25.4 %      Monocyte % 14.3 %      Eosinophil % 1.8 %      Basophil % 0.4 %      Immature Grans % 0.1 %      Neutrophils, Absolute 3.91 10*3/mm3      Lymphocytes, Absolute 1.72 10*3/mm3       Monocytes, Absolute 0.97 10*3/mm3      Eosinophils, Absolute 0.12 10*3/mm3      Basophils, Absolute 0.03 10*3/mm3      Immature Grans, Absolute 0.01 10*3/mm3     Magnesium [[id number]]  (Normal) Collected: 03/03/25 1418    Specimen: Blood Updated: 03/03/25 1451     Magnesium 2.1 mg/dL     Potassium [[id number]]  (Normal) Collected: 03/03/25 1418    Specimen: Blood Updated: 03/03/25 1451     Potassium 4.0 mmol/L      Comment: Slight hemolysis detected by analyzer. Result may be falsely elevated.             Imaging Results (Last 24 Hours)       ** No results found for the last 24 hours. **            Current Facility-Administered Medications:     ALPRAZolam (XANAX) tablet 0.5 mg, 0.5 mg, Oral, Q8H, Jayro Ambrose MD, 0.5 mg at 03/04/25 1207    docusate sodium (COLACE) capsule 100 mg, 100 mg, Oral, BID, Latia Santillan MD, 100 mg at 03/03/25 0829    finasteride (PROSCAR) tablet 5 mg, 5 mg, Oral, Daily, Latia Santillan MD, 5 mg at 03/04/25 0946    furosemide (LASIX) tablet 20 mg, 20 mg, Oral, BID, Latia Santillan MD, 20 mg at 03/04/25 0947    magnesium oxide (MAG-OX) tablet 400 mg, 400 mg, Oral, Daily, Latia Santillan MD, 400 mg at 03/04/25 0947    metoprolol tartrate (LOPRESSOR) tablet 50 mg, 50 mg, Oral, Q8H, Jayro Ambrose MD, 50 mg at 03/04/25 1207    oxyCODONE (ROXICODONE) immediate release tablet 5 mg, 5 mg, Oral, Q4H PRN, Latia Santillan MD, 5 mg at 03/02/25 0429    pantoprazole (PROTONIX) EC tablet 40 mg, 40 mg, Oral, BID AC, Jayro Ambrose MD, 40 mg at 03/04/25 0626    pravastatin (PRAVACHOL) tablet 10 mg, 10 mg, Oral, Daily, Latia Santillan MD, 10 mg at 03/04/25 0946    sacubitril-valsartan (ENTRESTO) 24-26 MG tablet 1 tablet, 1 tablet, Oral, BID, Latia Santillan MD, 1 tablet at 03/04/25 0947    [COMPLETED] Insert Peripheral IV, , , Once **AND** sodium chloride 0.9 % flush 10 mL, 10 mL, Intravenous, PRN, Jayro Ambrose MD    spironolactone (ALDACTONE) half tablet 12.5 mg, 12.5 mg, Oral,  Daily, Latia Santillan MD, 12.5 mg at 03/04/25 0947    tamsulosin (FLOMAX) 24 hr capsule 0.4 mg, 0.4 mg, Oral, BID, Latia Santillan MD, 0.4 mg at 03/04/25 0946     ASSESSMENT  Incarcerated right inguinal hernia with partial small bowel obstruction  Status post laparoscopic robotic assisted repair of incarcerated right inguinal hernia  Postop ileus resolved  Dilated nonischemic cardiomyopathy status post AICD  Hypertension  Hyperlipidemia  BPH   Anxiety disorder  Gastroesophageal reflux disease    PLAN  Discharge home  Discharge summary dictated    THUY ACEVEDO MD    Copied text in this note has been reviewed and is accurate as of 03/04/25

## 2025-03-04 NOTE — PROGRESS NOTES
"General Surgery  Progress Note    CC: Follow-up incarcerated right inguinal hernia    POD#2 laparoscopic robotic assisted repair of incarcerated right inguinal hernia with mesh    S: He had multiple bowel movements overnight and his abdominal distention resolved.  His nausea and vomiting have also resolved.  He is now tolerating a regular diet for lunch.    O:/61 (BP Location: Left arm, Patient Position: Lying)   Pulse 76   Temp 98 °F (36.7 °C) (Oral)   Resp 16   Ht 180.3 cm (71\")   Wt 86.2 kg (190 lb)   SpO2 91%   BMI 26.50 kg/m²     GENERAL: alert, well appearing, and in no distress  HEENT: normocephalic, atraumatic, moist mucous membranes, clear sclerae   CHEST: clear to auscultation, no wheezes, rales or rhonchi, symmetric air entry  CARDIAC: regular rate and rhythm    ABDOMEN: Softer and less distended today, incisions clean/dry/intact with glue, no palpable recurrent inguinal hernia, testicles are descended bilaterally and nontender  EXTREMITIES: no cyanosis, clubbing, or edema   SKIN: Warm and moist, no rashes    LABS  Results from last 7 days   Lab Units 03/04/25 0528 03/03/25  0553 03/01/25 2025   WBC 10*3/mm3 6.76 10.84* 9.66   HEMOGLOBIN g/dL 15.1 17.4 16.2   HEMATOCRIT % 43.1 50.4 47.7   PLATELETS 10*3/mm3 151 189 163   MONOCYTES % %  --   --  9.2   EOSINOPHIL % %  --   --  0.0*     Results from last 7 days   Lab Units 03/04/25 0528 03/03/25  1418 03/03/25  0553 03/01/25 2025   SODIUM mmol/L 140  --  145 141   POTASSIUM mmol/L 4.1 4.0 4.2 4.0   CHLORIDE mmol/L 101  --  102 104   CO2 mmol/L 28.0  --  22.7 25.0   BUN mg/dL 17  --  10 16   CREATININE mg/dL 1.04  --  0.85 0.89   CALCIUM mg/dL 8.8  --  9.5 9.3   BILIRUBIN mg/dL  --   --  0.9 0.5   ALK PHOS U/L  --   --  59 63   ALT (SGPT) U/L  --   --  16 18   AST (SGOT) U/L  --   --  37 23   GLUCOSE mg/dL 103*  --  129* 142*         A/P: 84 y.o. male POD#2 laparoscopic robotic assisted repair of incarcerated right inguinal hernia with " mesh    He can be discharged home today and follow-up with me in 2 weeks.  I will have my office contact him to schedule a follow-up appointment.  I discussed diet, activity, and bathing instructions postop with him and all questions were answered.    Latia Santillan MD  General, Robotic, and Endoscopic Surgery  [facility]    [address], [address]  [place] [address]  P: [phone]  F: [phone]

## 2025-03-04 NOTE — OUTREACH NOTE
Prep Survey      Flowsheet Row Responses   [facility] [place] facility patient discharged from? [place]   Is LACE score < 7 ? No   Eligibility Readm Mgmt   Discharge diagnosis Incarcerated right inguinal hernia- Laparoscopic robotic-assisted incarcerated right inguinal hernia repair with mesh   Does the patient have one of the following disease processes/diagnoses(primary or secondary)? General Surgery   Prep survey completed? Yes            Isatu ENGLISH - Registered Nurse

## 2025-03-05 NOTE — SIGNIFICANT NOTE
Case Management Discharge Note      Final Note: (P) Home    Provided Post Acute Provider List?: N/A  Provided Post Acute Provider Quality & Resource List?: N/A    Selected Continued Care - Discharged on 3/4/2025 Admission date: 3/1/2025 - Discharge disposition: Home or Self Care      Destination    No services have been selected for the patient.                Durable Medical Equipment    No services have been selected for the patient.                Dialysis/Infusion    No services have been selected for the patient.                Home Medical Care    No services have been selected for the patient.                Therapy    No services have been selected for the patient.                Community Resources    No services have been selected for the patient.                Community & DME    No services have been selected for the patient.                         Final Discharge Disposition Code: (P) 01 - home or self-care

## 2025-03-10 ENCOUNTER — READMISSION MANAGEMENT (OUTPATIENT)
Dept: CALL CENTER | Facility: HOSPITAL | Age: 85
End: 2025-03-10
Payer: MEDICARE

## 2025-03-10 NOTE — OUTREACH NOTE
General Surgery Week 1 Survey      Flowsheet Row Responses   [facility] patient discharged from? [place]   Does the patient have one of the following disease processes/diagnoses(primary or secondary)? General Surgery   Week 1 attempt successful? Yes   Call start time 1517   Call end time 1519   Discharge diagnosis Incarcerated right inguinal hernia- Laparoscopic robotic-assisted incarcerated right inguinal hernia repair with mesh   Person spoke with today (if not patient) and relationship Patient   Meds reviewed with patient/caregiver? Yes   Does the patient have all medications related to this admission filled (includes all antibiotics, pain medications, etc.) Yes   Is the patient taking all medications as directed (includes completed medication regime)? Yes   Does the patient have a follow up appointment scheduled with their surgeon? Yes  [3/21]   Has the patient kept scheduled appointments due by today? Yes   Has home health visited the patient within 72 hours of discharge? N/A   Psychosocial issues? No   Did the patient receive a copy of their discharge instructions? Yes   Nursing interventions Reviewed instructions with patient   What is the patient's perception of their health status since discharge? Improving   Is the patient/caregiver able to teach back steps to recovery at home? Set small, achievable goals for return to baseline health, Rest and rebuild strength, gradually increase activity, Eat a well-balance diet   If the patient is a current smoker, are they able to teach back resources for cessation? Not a smoker   Is the patient/caregiver able to teach back the hierarchy of who to call/visit for symptoms/problems? PCP, Specialist, Home health nurse, Urgent Care, ED, 911 Yes   Week 1 call completed? Yes   Is the patient interested in additional calls from an ambulatory ? No   Would this patient benefit from a Referral to [facility] Social Work? No   Call end time 1519            OSEI Hector  Registered Nurse

## 2025-03-12 ENCOUNTER — CLINICAL SUPPORT NO REQUIREMENTS (OUTPATIENT)
Age: 85
End: 2025-03-12
Payer: MEDICARE

## 2025-03-12 ENCOUNTER — OFFICE VISIT (OUTPATIENT)
Dept: CARDIOLOGY | Facility: CLINIC | Age: 85
End: 2025-03-12
Payer: MEDICARE

## 2025-03-12 VITALS
HEART RATE: 69 BPM | SYSTOLIC BLOOD PRESSURE: 120 MMHG | HEIGHT: 71 IN | WEIGHT: 185.2 LBS | BODY MASS INDEX: 25.93 KG/M2 | DIASTOLIC BLOOD PRESSURE: 72 MMHG

## 2025-03-12 DIAGNOSIS — I42.0 CARDIOMYOPATHY, DILATED, NONISCHEMIC: Primary | ICD-10-CM

## 2025-03-12 DIAGNOSIS — I47.29 NONSUSTAINED VENTRICULAR TACHYCARDIA: ICD-10-CM

## 2025-03-12 DIAGNOSIS — K40.30 INCARCERATED RIGHT INGUINAL HERNIA: ICD-10-CM

## 2025-03-12 DIAGNOSIS — Z95.810 ICD (IMPLANTABLE CARDIOVERTER-DEFIBRILLATOR) IN PLACE: ICD-10-CM

## 2025-03-12 DIAGNOSIS — K56.600 PARTIAL SMALL BOWEL OBSTRUCTION: ICD-10-CM

## 2025-03-12 DIAGNOSIS — I47.20 VENTRICULAR TACHYCARDIA: ICD-10-CM

## 2025-03-12 DIAGNOSIS — I49.3 PVCS (PREMATURE VENTRICULAR CONTRACTIONS): ICD-10-CM

## 2025-03-12 PROCEDURE — 1159F MED LIST DOCD IN RCRD: CPT | Performed by: NURSE PRACTITIONER

## 2025-03-12 PROCEDURE — 1160F RVW MEDS BY RX/DR IN RCRD: CPT | Performed by: NURSE PRACTITIONER

## 2025-03-12 PROCEDURE — 93000 ELECTROCARDIOGRAM COMPLETE: CPT | Performed by: NURSE PRACTITIONER

## 2025-03-12 PROCEDURE — 99214 OFFICE O/P EST MOD 30 MIN: CPT | Performed by: NURSE PRACTITIONER

## 2025-03-12 RX ORDER — SPIRONOLACTONE 25 MG/1
12.5 TABLET ORAL DAILY
Qty: 45 TABLET | Refills: 1 | Status: SHIPPED | OUTPATIENT
Start: 2025-03-12

## 2025-03-12 RX ORDER — SACUBITRIL AND VALSARTAN 24; 26 MG/1; MG/1
1 TABLET, FILM COATED ORAL 2 TIMES DAILY
Qty: 180 TABLET | Refills: 1 | Status: SHIPPED | OUTPATIENT
Start: 2025-03-12

## 2025-03-12 RX ORDER — FUROSEMIDE 20 MG/1
TABLET ORAL
Qty: 100 TABLET | Refills: 1 | Status: SHIPPED | OUTPATIENT
Start: 2025-03-12

## 2025-03-12 RX ORDER — METOPROLOL TARTRATE 50 MG
50 TABLET ORAL 3 TIMES DAILY
Qty: 270 TABLET | Refills: 1 | Status: SHIPPED | OUTPATIENT
Start: 2025-03-12

## 2025-03-12 NOTE — PROGRESS NOTES
Date of Office Visit: 2025  Encounter Provider: ALYSON Cooper  Place of Service: [facility] CARDIOLOGY  Patient Name: Harvey Leyva  :1940  Primary Cardiologist: Dr. Micheal Reagan  Electrophysiologist: Dr. Harvey Gaitan    Chief Complaint   Patient presents with    Cardiomyopathy    Annual Exam   :     HPI: Harvey Leyva is a 84 y.o. male who presents today for annual cardiac follow-up visit.  I reviewed his medical records.    He has known hypertension, hyperlipidemia, nonischemic cardiomyopathy, status post ICD placement, PVCs, and nonsustained ventricular tachycardia.  In 2017, he underwent permanent dual-chamber ICD replacement.    In 2022, LVEF was 41-45%, grade 1 diastolic dysfunction, mild LVH, moderate hypokinesis of the basal to mid inferior septum, left atrium mild to moderately dilated, trace aortic regurgitation, trace to mild mitral regurgitation, mild tricuspid regurgitation, and trace pulmonic regurgitation.    Last week, he presented to the [facility] ED with abdominal pain and right groin pain.  He was diagnosed with incarcerated right inguinal hernia with partial small bowel obstruction.  He underwent laparoscopic robotic assisted repair of incarcerated right inguinal hernia.  Postop ileus resolved.  Dr. Macias was consulted for a single 10 beat run of ventricular tachycardia.  This has been noted on ICD checks in the past and she recommended continuing with metoprolol.    He presents today for annual cardiac follow-up visit.  He just had his ICD checked in our pacemaker clinic and it looked good.  I explained that he had the nonsustained ventricular tachycardia run in the hospital.  He remembers that he had just vomited before he was told that he had the episode.  He reports occasional fast heartbeat.  He denies chest pain, shortness of air, PND, orthopnea, edema, dizziness, syncope, or bleeding.  He takes furosemide 20 mg 1 tablet daily in   reviewed    LR 7/11/19   the morning and additional 20 mg as needed.  He is moving to [place], [place] in 6 weeks.  He plans to obtain another cardiologist there.      Past Medical History:   Diagnosis Date    Basal cell carcinoma     Dilated cardiomyopathy     Diverticulosis     Hernia, inguinal     right    History of prostate disorder     Hyperlipidemia     Hypertension     Hypotension     due to drugs    ICD (implantable cardioverter-defibrillator) in place     2/2017, Medtronic dual chamber    Nonsustained ventricular tachycardia     PVCs (premature ventricular contractions)     Slow to wake up after anesthesia     with left inguinal hernia       Past Surgical History:   Procedure Laterality Date    BACK SURGERY N/A 1986    CARDIAC DEFIBRILLATOR PLACEMENT N/A 07/02/2009    Prophylactic AICD (Medtronic Secura DR model # V005RMN, serial # [id number])-Dr. Barrett Michael    CARDIAC ELECTROPHYSIOLOGY PROCEDURE Left 02/20/2017    Procedure: Device Replacement   ICD GEN CHG MEDTRONIC;  Surgeon: Guido Michael MD;  Location: [facility] INVASIVE LOCATION;  Service:     CATARACT EXTRACTION Bilateral     COLONOSCOPY N/A 12/06/2010    Roel Clancy     FINE NEEDLE ASPIRATION N/A 11/29/2009    Pericardial fluid aspiration-Dr. Willian Quintanilla    INGUINAL HERNIA REPAIR Left 1984    laparoscopic    INGUINAL HERNIA REPAIR Right 3/1/2025    Procedure: Laparoscopic robotic-assisted incarcerated right inguinal hernia repair with mesh;  Surgeon: Latia Santillan MD;  Location: [facility];  Service: Robotics - DaVinci;  Laterality: Right;    PROSTATE SURGERY      VEIN LIGATION AND STRIPPING         Social History     Socioeconomic History    Marital status:    Tobacco Use    Smoking status: Former    Smokeless tobacco: Never   Vaping Use    Vaping status: Never Used   Substance and Sexual Activity    Alcohol use: No     Comment: caffeine use    Drug use: No    Sexual activity: Defer       Family History   Problem Relation  Age of Onset    Heart failure Mother     Prostate cancer Father     Heart disease Brother     Prostate cancer Brother     Malig Hyperthermia Neg Hx        The following portion of the patient's history were reviewed and updated as appropriate: past medical history, past surgical history, past social history, past family history, allergies, current medications, and problem list.    Review of Systems   Constitutional: Negative.   Cardiovascular:  Positive for palpitations.   Respiratory: Negative.     Hematologic/Lymphatic: Negative.    Neurological: Negative.        No Known Allergies      Current Outpatient Medications:     ALPRAZolam (XANAX) 0.5 MG tablet, Take 1 tablet by mouth 3 (Three) Times a Day., Disp: , Rfl:     carboxymethylcellulose (REFRESH PLUS) 0.5 % solution, Apply  to eye(s) as directed by provider 4 (Four) Times a Day., Disp: , Rfl:     docusate sodium 100 MG capsule, Take 1 capsule by mouth 2 (Two) Times a Day for 30 days., Disp: 60 capsule, Rfl: 0    esomeprazole (nexIUM) 20 MG capsule, Take 1 capsule by mouth Every Morning Before Breakfast., Disp: , Rfl:     finasteride (PROSCAR) 5 MG tablet, Take 1 tablet by mouth Daily., Disp: , Rfl:     furosemide (LASIX) 20 MG tablet, 1 tablet daily in the morning and may take 1 additional tablet as needed for edema or shortness of breath, Disp: 100 tablet, Rfl: 1    magnesium oxide (MAGOX) 400 (241.3 Mg) MG tablet tablet, Take 1 tablet by mouth Daily., Disp: , Rfl:     metoprolol tartrate (LOPRESSOR) 50 MG tablet, Take 1 tablet by mouth 3 (Three) Times a Day., Disp: 270 tablet, Rfl: 1    pravastatin (PRAVACHOL) 10 MG tablet, Take 1 tablet by mouth Daily., Disp: , Rfl:     sacubitril-valsartan (Entresto) 24-26 MG tablet, Take 1 tablet by mouth 2 (Two) Times a Day., Disp: 180 tablet, Rfl: 1    spironolactone (ALDACTONE) 25 MG tablet, Take 0.5 tablets by mouth Daily., Disp: 45 tablet, Rfl: 1    tamsulosin (FLOMAX) 0.4 MG capsule 24 hr capsule, Take 1 capsule by  "mouth 2 (Two) Times a Day., Disp: , Rfl:          Objective:     Vitals:    03/12/25 0950   BP: 120/72   BP Location: Left arm   Patient Position: Sitting   Cuff Size: Adult   Pulse: 69   Weight: 84 kg (185 lb 3.2 oz)   Height: 180.3 cm (70.98\")     Body mass index is 25.84 kg/m².    PHYSICAL EXAM:    Vitals Reviewed.   General Appearance: No acute distress, well developed and well nourished.   HENT: No hearing loss noted.    Respiratory: No signs of respiratory distress. Respiration rhythm and depth normal.  Clear to auscultation.   Cardiovascular:  Jugular Venous Pressure: Normal  Heart Rate and Rhythm: Normal, Heart Sounds: Normal S1 and S2. No S3 or S4 noted.  Murmurs: No murmurs noted. No rubs, thrills, or gallops.   Lower Extremities: No edema noted.  Musculoskeletal: Normal movement of extremities.  Skin: General appearance normal.    Psychiatric: Patient alert and oriented to person, place, and time. Speech and behavior appropriate. Normal mood and affect.       ECG 12 Lead    Date/Time: 3/12/2025 10:00 AM  Performed by: Greta Ibrahim APRN    Authorized by: Greta Ibrahim APRN  Comparison: compared with previous ECG from 3/3/2025  Similar to previous ECG  Rhythm: sinus rhythm  Rate: normal  BPM: 69  Conduction: non-specific intraventricular conduction delay  ST Segments: ST segments normal  T Waves: T waves normal  QRS axis: normal    Clinical impression: non-specific ECG            Assessment:       Diagnosis Plan   1. Cardiomyopathy, dilated, nonischemic        2. PVCs (premature ventricular contractions)        3. Nonsustained ventricular tachycardia        4. ICD (implantable cardioverter-defibrillator) in place        5. Incarcerated right inguinal hernia        6. Partial small bowel obstruction               Plan:       1.  Nonischemic Cardiomyopathy: NYHA class II.  Last LVEF 41-45% per echocardiogram in 2022.  Euvolemic today.  Continue GDMT-sacubitril-valsartan and spironolactone.  He is not " on metoprolol succinate because he takes tartrate since he has started PT and PVCs.  He takes furosemide 20 mg 1 tablet in the morning and another tablet as needed for edema or shortness of breath.  He has not needed to do so.  Last BMP was stable.    2/3.  Known PVCs and nonsustained ventricular tachycardia runs.  Asymptomatic.  Continue metoprolol tartrate 50 mg 3 times daily.    4.  Status post ICD placement.  He continues with routine ICD checks in our heart rhythm center.  This will need to be transitioned once he moved to [place], [place].    5/6.  He was recently hospitalized for incarcerated right inguinal hernia and partial small bowel obstruction.  He has some residual discomfort postsurgery.  He vomited in the hospital and then was told he had a 10 beat run of ventricular tachycardia.  He was asymptomatic.    7.  I discussed his plan of care with Dr. Micheal Reagan.  She recommended continuing with current medications.  He does not need any repeat cardiac testing this year according to her.  He will establish care with a new cardiologist in [place], [place]. It has been a pleasure taking care of this nice gentleman.    As always, it has been a pleasure to participate in your patient's care. Thank you.         Sincerely,         ALYSON Souza  [facility] Cardiology      Dictated utilizing Dragon Dictation

## 2025-03-20 ENCOUNTER — READMISSION MANAGEMENT (OUTPATIENT)
Dept: CALL CENTER | Facility: HOSPITAL | Age: 85
End: 2025-03-20
Payer: MEDICARE

## 2025-03-20 NOTE — OUTREACH NOTE
General Surgery Week 2 Survey      Flowsheet Row Responses   [facility] patient discharged from? [place]   Does the patient have one of the following disease processes/diagnoses(primary or secondary)? General Surgery   Week 2 attempt successful? Yes   Call end time 1427   Discharge diagnosis Incarcerated right inguinal hernia- Laparoscopic robotic-assisted incarcerated right inguinal hernia repair with mesh   Person spoke with today (if not patient) and relationship Patient   Meds reviewed with patient/caregiver? Yes   Is the patient having any side effects they believe may be caused by any medication additions or changes? No   Does the patient have all medications related to this admission filled (includes all antibiotics, pain medications, etc.) Yes   Is the patient taking all medications as directed (includes completed medication regime)? Yes   Does the patient have a follow up appointment scheduled with their surgeon? Yes   Has the patient kept scheduled appointments due by today? Yes   Psychosocial issues? No   Did the patient receive a copy of their discharge instructions? Yes   Nursing interventions Reviewed instructions with patient   What is the patient's perception of their health status since discharge? Improving   Nursing interventions Nurse provided patient education   Is the patient /caregiver able to teach back basic post-op care? Lifting as instructed by MD in discharge instructions   Is the patient/caregiver able to teach back signs and symptoms of incisional infection? Increased redness, swelling or pain at the incisonal site, Increased drainage or bleeding, Incisional warmth, Pus or odor from incision, Fever   Is the patient/caregiver able to teach back steps to recovery at home? Set small, achievable goals for return to baseline health, Rest and rebuild strength, gradually increase activity, Eat a well-balance diet   If the patient is a current smoker, are they able to teach back  resources for cessation? Not a smoker   Is the patient/caregiver able to teach back the hierarchy of who to call/visit for symptoms/problems? PCP, Specialist, Home health nurse, Urgent Care, ED, 911 Yes   Week 2 call completed? Yes   Graduated Yes   Is the patient interested in additional calls from an ambulatory ? No   Would this patient benefit from a Referral to Amb Social Work? No   Wrap up additional comments doing well no needs or concerns   Call end time [id number]            TANK RIVERA - Registered Nurse

## 2025-03-21 ENCOUNTER — OFFICE VISIT (OUTPATIENT)
Dept: SURGERY | Facility: CLINIC | Age: 85
End: 2025-03-21
Payer: MEDICARE

## 2025-03-21 VITALS
OXYGEN SATURATION: 96 % | DIASTOLIC BLOOD PRESSURE: 75 MMHG | WEIGHT: 184.2 LBS | SYSTOLIC BLOOD PRESSURE: 122 MMHG | BODY MASS INDEX: 26.37 KG/M2 | HEIGHT: 70 IN | HEART RATE: 85 BPM

## 2025-03-21 DIAGNOSIS — Z09 SURGICAL FOLLOWUP: Primary | ICD-10-CM

## 2025-03-21 NOTE — PROGRESS NOTES
CHIEF COMPLAINT:   Chief Complaint   Patient presents with    Post-op     Laparoscopic robotic assisted repair of incarcerated right inguinal hernia with mesh 03/02/25       HISTORY OF PRESENT ILLNESS:  This is a 84 y.o. male who presents for a post-operative visit after undergoing laparoscopic robotic assisted repair of an incarcerated right inguinal hernia with mesh on 3/2/2025.  He had a mild postoperative ileus, related to the small bowel obstruction induced by the incarcerated hernia.  Eventually, he had resumption of bowel function and was discharged to home tolerating a regular diet.  He has been doing fantastic since leaving the hospital and denies any nausea, vomiting, fever, chills, or trouble with urination.    PHYSICAL EXAM:  Lungs: Clear  Heart: RRR  ABD: Incisions are healing well without any erythema or signs of infection.  : No evidence for inguinal hernia bilaterally when he stands upright and performs Valsalva, testicles are descended bilaterally and nontender although the left testicle is much smaller than the right, there is no evidence for seroma or hematoma within the right inguinal region and no ecchymosis of the scrotum  Ext: no significant edema, calves nontender    A/P:  This is a 84 y.o. male patient who is S/P laparoscopic robotic assisted repair of an incarcerated right inguinal hernia with mesh on 3/2/2025    He is healing very well.  He can now slowly reintroduce heavy lifting and exercising at his leisure.  He can follow-up with me on an as-needed basis.    Latia Santillan MD  General, Robotic, and Endoscopic Surgery  [facility]    [address], [address]  [place] [address]  P: [phone]  F: [phone]

## 2025-06-25 RX ORDER — SPIRONOLACTONE 25 MG/1
12.5 TABLET ORAL DAILY
Qty: 45 TABLET | Refills: 1 | Status: SHIPPED | OUTPATIENT
Start: 2025-06-25

## 2025-06-25 RX ORDER — METOPROLOL TARTRATE 50 MG
50 TABLET ORAL 3 TIMES DAILY
Qty: 270 TABLET | Refills: 1 | Status: SHIPPED | OUTPATIENT
Start: 2025-06-25

## (undated) DEVICE — LOU PACE DEFIB: Brand: MEDLINE INDUSTRIES, INC.

## (undated) DEVICE — SUREFIT, DUAL DISPERSIVE ELECTRODE, CONTACT QUALITY MONITOR: Brand: SUREFIT

## (undated) DEVICE — ANTIBACTERIAL UNDYED BRAIDED (POLYGLACTIN 910), SYNTHETIC ABSORBABLE SUTURE: Brand: COATED VICRYL

## (undated) DEVICE — VIOLET BRAIDED (POLYGLACTIN 910), SYNTHETIC ABSORBABLE SUTURE: Brand: COATED VICRYL

## (undated) DEVICE — SOL ANTISTICK CAUTRY ELECTROLUBE LF

## (undated) DEVICE — PATIENT RETURN ELECTRODE, SINGLE-USE, CONTACT QUALITY MONITORING, ADULT, WITH 9FT CORD, FOR PATIENTS WEIGING OVER 33LBS. (15KG): Brand: MEGADYNE

## (undated) DEVICE — BLADELESS OBTURATOR: Brand: WECK VISTA

## (undated) DEVICE — ELECTRD ECG CARBON/SNP RL FM A/ 5PK

## (undated) DEVICE — SUT VIC 0 UR6 27IN VCP603H

## (undated) DEVICE — TBG INSUFFLATION LUER LOCK: Brand: MEDLINE INDUSTRIES, INC.

## (undated) DEVICE — Device

## (undated) DEVICE — THE STERILE LIGHT HANDLE COVER IS USED WITH STERIS SURGICAL LIGHTING AND VISUALIZATION SYSTEMS.

## (undated) DEVICE — SEAL

## (undated) DEVICE — COLUMN DRAPE

## (undated) DEVICE — SYR LUERLOK 5CC

## (undated) DEVICE — KIT,ANTI FOG,W/SPONGE & FLUID,SOFT PACK: Brand: MEDLINE

## (undated) DEVICE — SYR LL TP 10ML STRL

## (undated) DEVICE — LIMB HOLDER, WRIST/ANKLE: Brand: DEROYAL

## (undated) DEVICE — GLV SURG SENSICARE POLYISPRN W/ALOE PF LF 6.5 GRN STRL

## (undated) DEVICE — COVER,MAYO STAND,STERILE: Brand: MEDLINE

## (undated) DEVICE — SOL NACL 0.9PCT 1000ML

## (undated) DEVICE — GLV SURG BIOGEL LTX PF 6

## (undated) DEVICE — LOU GENERAL ROBOT: Brand: MEDLINE INDUSTRIES, INC.

## (undated) DEVICE — ARM DRAPE

## (undated) DEVICE — TIP COVER ACCESSORY

## (undated) DEVICE — EXOFIN PRECISION PEN HIGH VISCOSITY TOPICAL SKIN ADHESIVE: Brand: EXOFIN PRECISION PEN, 1G

## (undated) DEVICE — GOWN,SIRUS,NON REINFRCD,LARGE,SET IN SL: Brand: MEDLINE